# Patient Record
Sex: FEMALE | Race: WHITE | NOT HISPANIC OR LATINO | Employment: OTHER | URBAN - METROPOLITAN AREA
[De-identification: names, ages, dates, MRNs, and addresses within clinical notes are randomized per-mention and may not be internally consistent; named-entity substitution may affect disease eponyms.]

---

## 2019-02-19 ENCOUNTER — HOSPITAL ENCOUNTER (OUTPATIENT)
Dept: RESPIRATORY THERAPY | Facility: HOSPITAL | Age: 59
Discharge: HOME | End: 2019-02-19

## 2020-03-16 ENCOUNTER — HOSPITAL ENCOUNTER (OUTPATIENT)
Facility: HOSPITAL | Age: 60
Setting detail: OBSERVATION
Discharge: HOME | End: 2020-03-17
Attending: INTERNAL MEDICINE | Admitting: INTERNAL MEDICINE

## 2020-03-16 ENCOUNTER — APPOINTMENT (OUTPATIENT)
Dept: CARDIOLOGY | Facility: HOSPITAL | Age: 60
End: 2020-03-16

## 2020-03-16 DIAGNOSIS — I82.4Y1 ACUTE DEEP VEIN THROMBOSIS (DVT) OF PROXIMAL VEIN OF RIGHT LOWER EXTREMITY (HCC): ICD-10-CM

## 2020-03-16 DIAGNOSIS — M79.604 RIGHT LEG PAIN: Primary | ICD-10-CM

## 2020-03-16 PROBLEM — I82.409 DVT OF LEG (DEEP VENOUS THROMBOSIS): Status: ACTIVE | Noted: 2020-03-16

## 2020-03-16 PROBLEM — G35 MULTIPLE SCLEROSIS (HCC): Status: ACTIVE | Noted: 2020-03-16

## 2020-03-16 PROBLEM — F17.200 SMOKER: Status: ACTIVE | Noted: 2017-06-08

## 2020-03-16 LAB
ALBUMIN SERPL-MCNC: 2.9 G/DL (ref 3.5–5.2)
ALBUMIN/GLOB SERPL: 0.9 G/DL
ALP SERPL-CCNC: 75 U/L (ref 39–117)
ALT SERPL W P-5'-P-CCNC: 27 U/L (ref 1–33)
ANION GAP SERPL CALCULATED.3IONS-SCNC: 12 MMOL/L (ref 5–15)
APTT PPP: 26.8 SECONDS (ref 61–76.5)
AST SERPL-CCNC: 44 U/L (ref 1–32)
BASOPHILS # BLD AUTO: 0.1 10*3/MM3 (ref 0–0.2)
BASOPHILS NFR BLD AUTO: 1.4 % (ref 0–1.5)
BH CV LOW VAS LEFT COMMON FEMORAL SPONT: 1
BH CV LOW VAS LEFT DISTAL FEMORAL SPONT: 1
BH CV LOW VAS LEFT GASTRONEMIUS VESSEL: 1
BH CV LOW VAS LEFT GREATER SAPH AK VESSEL: 1
BH CV LOW VAS LEFT MID FEMORAL SPONT: 1
BH CV LOW VAS LEFT POPLITEAL SPONT: 1
BH CV LOW VAS LEFT POSTERIOR TIBIAL VESSEL: 1
BH CV LOW VAS LEFT PROFUNDA FEMORAL SPONT: 1
BH CV LOW VAS LEFT PROXIMAL FEMORAL SPONT: 1
BH CV LOW VAS LEFT SAPHENOFEMORAL JUNCTION SPONT: 1
BH CV LOWER VASCULAR LEFT COMMON FEMORAL AUGMENT: NORMAL
BH CV LOWER VASCULAR LEFT COMMON FEMORAL COMPETENT: NORMAL
BH CV LOWER VASCULAR LEFT COMMON FEMORAL COMPRESS: NORMAL
BH CV LOWER VASCULAR LEFT COMMON FEMORAL PHASIC: NORMAL
BH CV LOWER VASCULAR LEFT COMMON FEMORAL SPONT: NORMAL
BH CV LOWER VASCULAR LEFT COMMON FEMORAL THROMBUS: NORMAL
BH CV LOWER VASCULAR LEFT DISTAL FEMORAL AUGMENT: NORMAL
BH CV LOWER VASCULAR LEFT DISTAL FEMORAL COMPETENT: NORMAL
BH CV LOWER VASCULAR LEFT DISTAL FEMORAL COMPRESS: NORMAL
BH CV LOWER VASCULAR LEFT DISTAL FEMORAL PHASIC: NORMAL
BH CV LOWER VASCULAR LEFT DISTAL FEMORAL SPONT: NORMAL
BH CV LOWER VASCULAR LEFT DISTAL FEMORAL THROMBUS: NORMAL
BH CV LOWER VASCULAR LEFT GASTRONEMIUS COMPRESS: NORMAL
BH CV LOWER VASCULAR LEFT GASTRONEMIUS THROMBUS: NORMAL
BH CV LOWER VASCULAR LEFT GREATER SAPH AK COMPRESS: NORMAL
BH CV LOWER VASCULAR LEFT GREATER SAPH AK THROMBUS: NORMAL
BH CV LOWER VASCULAR LEFT GREATER SAPH BK COMPRESS: NORMAL
BH CV LOWER VASCULAR LEFT LESSER SAPH COMPRESS: NORMAL
BH CV LOWER VASCULAR LEFT MID FEMORAL AUGMENT: NORMAL
BH CV LOWER VASCULAR LEFT MID FEMORAL COMPETENT: NORMAL
BH CV LOWER VASCULAR LEFT MID FEMORAL COMPRESS: NORMAL
BH CV LOWER VASCULAR LEFT MID FEMORAL PHASIC: NORMAL
BH CV LOWER VASCULAR LEFT MID FEMORAL SPONT: NORMAL
BH CV LOWER VASCULAR LEFT MID FEMORAL THROMBUS: NORMAL
BH CV LOWER VASCULAR LEFT PERONEAL COMPRESS: NORMAL
BH CV LOWER VASCULAR LEFT POPLITEAL AUGMENT: NORMAL
BH CV LOWER VASCULAR LEFT POPLITEAL COMPETENT: NORMAL
BH CV LOWER VASCULAR LEFT POPLITEAL COMPRESS: NORMAL
BH CV LOWER VASCULAR LEFT POPLITEAL PHASIC: NORMAL
BH CV LOWER VASCULAR LEFT POPLITEAL SPONT: NORMAL
BH CV LOWER VASCULAR LEFT POPLITEAL THROMBUS: NORMAL
BH CV LOWER VASCULAR LEFT POSTERIOR TIBIAL COMPRESS: NORMAL
BH CV LOWER VASCULAR LEFT POSTERIOR TIBIAL THROMBUS: NORMAL
BH CV LOWER VASCULAR LEFT PROFUNDA FEMORAL AUGMENT: NORMAL
BH CV LOWER VASCULAR LEFT PROFUNDA FEMORAL COMPETENT: NORMAL
BH CV LOWER VASCULAR LEFT PROFUNDA FEMORAL COMPRESS: NORMAL
BH CV LOWER VASCULAR LEFT PROFUNDA FEMORAL PHASIC: NORMAL
BH CV LOWER VASCULAR LEFT PROFUNDA FEMORAL SPONT: NORMAL
BH CV LOWER VASCULAR LEFT PROFUNDA FEMORAL THROMBUS: NORMAL
BH CV LOWER VASCULAR LEFT PROXIMAL FEMORAL AUGMENT: NORMAL
BH CV LOWER VASCULAR LEFT PROXIMAL FEMORAL COMPETENT: NORMAL
BH CV LOWER VASCULAR LEFT PROXIMAL FEMORAL COMPRESS: NORMAL
BH CV LOWER VASCULAR LEFT PROXIMAL FEMORAL PHASIC: NORMAL
BH CV LOWER VASCULAR LEFT PROXIMAL FEMORAL SPONT: NORMAL
BH CV LOWER VASCULAR LEFT PROXIMAL FEMORAL THROMBUS: NORMAL
BH CV LOWER VASCULAR LEFT SAPHENOFEMORAL JUNCTION AUGMENT: NORMAL
BH CV LOWER VASCULAR LEFT SAPHENOFEMORAL JUNCTION COMPETENT: NORMAL
BH CV LOWER VASCULAR LEFT SAPHENOFEMORAL JUNCTION COMPRESS: NORMAL
BH CV LOWER VASCULAR LEFT SAPHENOFEMORAL JUNCTION PHASIC: NORMAL
BH CV LOWER VASCULAR LEFT SAPHENOFEMORAL JUNCTION SPONT: NORMAL
BH CV LOWER VASCULAR LEFT SAPHENOFEMORAL JUNCTION THROMBUS: NORMAL
BH CV LOWER VASCULAR RIGHT COMMON FEMORAL AUGMENT: NORMAL
BH CV LOWER VASCULAR RIGHT COMMON FEMORAL COMPETENT: NORMAL
BH CV LOWER VASCULAR RIGHT COMMON FEMORAL COMPRESS: NORMAL
BH CV LOWER VASCULAR RIGHT COMMON FEMORAL PHASIC: NORMAL
BH CV LOWER VASCULAR RIGHT COMMON FEMORAL SPONT: NORMAL
BILIRUB SERPL-MCNC: 0.7 MG/DL (ref 0.2–1.2)
BUN BLD-MCNC: 5 MG/DL (ref 6–20)
BUN/CREAT SERPL: 12.8 (ref 7–25)
CALCIUM SPEC-SCNC: 9 MG/DL (ref 8.6–10.5)
CHLORIDE SERPL-SCNC: 103 MMOL/L (ref 98–107)
CO2 SERPL-SCNC: 22 MMOL/L (ref 22–29)
CREAT BLD-MCNC: 0.39 MG/DL (ref 0.57–1)
DEPRECATED RDW RBC AUTO: 47.7 FL (ref 37–54)
EOSINOPHIL # BLD AUTO: 0.1 10*3/MM3 (ref 0–0.4)
EOSINOPHIL NFR BLD AUTO: 1.5 % (ref 0.3–6.2)
ERYTHROCYTE [DISTWIDTH] IN BLOOD BY AUTOMATED COUNT: 13.7 % (ref 12.3–15.4)
GFR SERPL CREATININE-BSD FRML MDRD: >150 ML/MIN/1.73
GLOBULIN UR ELPH-MCNC: 3.1 GM/DL
GLUCOSE BLD-MCNC: 161 MG/DL (ref 65–99)
HCT VFR BLD AUTO: 49.1 % (ref 34–46.6)
HGB BLD-MCNC: 16.8 G/DL (ref 12–15.9)
HOLD SPECIMEN: NORMAL
HOLD SPECIMEN: NORMAL
INR PPP: 1.1 (ref 0.9–1.1)
LYMPHOCYTES # BLD AUTO: 2.6 10*3/MM3 (ref 0.7–3.1)
LYMPHOCYTES NFR BLD AUTO: 27.9 % (ref 19.6–45.3)
MCH RBC QN AUTO: 34 PG (ref 26.6–33)
MCHC RBC AUTO-ENTMCNC: 34.3 G/DL (ref 31.5–35.7)
MCV RBC AUTO: 99.2 FL (ref 79–97)
MONOCYTES # BLD AUTO: 1.3 10*3/MM3 (ref 0.1–0.9)
MONOCYTES NFR BLD AUTO: 14.1 % (ref 5–12)
NEUTROPHILS # BLD AUTO: 5.2 10*3/MM3 (ref 1.7–7)
NEUTROPHILS NFR BLD AUTO: 55.1 % (ref 42.7–76)
NRBC BLD AUTO-RTO: 0 /100 WBC (ref 0–0.2)
PLATELET # BLD AUTO: 264 10*3/MM3 (ref 140–450)
PMV BLD AUTO: 8.8 FL (ref 6–12)
POTASSIUM BLD-SCNC: 3.3 MMOL/L (ref 3.5–5.2)
PROT SERPL-MCNC: 6 G/DL (ref 6–8.5)
PROTHROMBIN TIME: 11.4 SECONDS (ref 9.6–11.7)
RBC # BLD AUTO: 4.95 10*6/MM3 (ref 3.77–5.28)
SODIUM BLD-SCNC: 137 MMOL/L (ref 136–145)
WBC NRBC COR # BLD: 9.4 10*3/MM3 (ref 3.4–10.8)
WHOLE BLOOD HOLD SPECIMEN: NORMAL

## 2020-03-16 PROCEDURE — 80053 COMPREHEN METABOLIC PANEL: CPT | Performed by: PHYSICIAN ASSISTANT

## 2020-03-16 PROCEDURE — G0378 HOSPITAL OBSERVATION PER HR: HCPCS

## 2020-03-16 PROCEDURE — 99284 EMERGENCY DEPT VISIT MOD MDM: CPT

## 2020-03-16 PROCEDURE — 85610 PROTHROMBIN TIME: CPT | Performed by: NURSE PRACTITIONER

## 2020-03-16 PROCEDURE — 85025 COMPLETE CBC W/AUTO DIFF WBC: CPT | Performed by: PHYSICIAN ASSISTANT

## 2020-03-16 PROCEDURE — 25010000002 HEPARIN (PORCINE) PER 1000 UNITS: Performed by: NURSE PRACTITIONER

## 2020-03-16 PROCEDURE — 99219 PR INITIAL OBSERVATION CARE/DAY 50 MINUTES: CPT | Performed by: PHYSICIAN ASSISTANT

## 2020-03-16 PROCEDURE — 93971 EXTREMITY STUDY: CPT

## 2020-03-16 PROCEDURE — 85730 THROMBOPLASTIN TIME PARTIAL: CPT | Performed by: NURSE PRACTITIONER

## 2020-03-16 RX ORDER — MAGNESIUM SULFATE HEPTAHYDRATE 40 MG/ML
4 INJECTION, SOLUTION INTRAVENOUS AS NEEDED
Status: DISCONTINUED | OUTPATIENT
Start: 2020-03-16 | End: 2020-03-17 | Stop reason: HOSPADM

## 2020-03-16 RX ORDER — POTASSIUM CHLORIDE 20 MEQ/1
40 TABLET, EXTENDED RELEASE ORAL AS NEEDED
Status: DISCONTINUED | OUTPATIENT
Start: 2020-03-16 | End: 2020-03-17 | Stop reason: HOSPADM

## 2020-03-16 RX ORDER — ONDANSETRON 4 MG/1
4 TABLET, FILM COATED ORAL EVERY 6 HOURS PRN
Status: DISCONTINUED | OUTPATIENT
Start: 2020-03-16 | End: 2020-03-17 | Stop reason: HOSPADM

## 2020-03-16 RX ORDER — MAGNESIUM SULFATE HEPTAHYDRATE 40 MG/ML
2 INJECTION, SOLUTION INTRAVENOUS AS NEEDED
Status: DISCONTINUED | OUTPATIENT
Start: 2020-03-16 | End: 2020-03-17 | Stop reason: HOSPADM

## 2020-03-16 RX ORDER — HEPARIN SODIUM 10000 [USP'U]/100ML
18 INJECTION, SOLUTION INTRAVENOUS
Status: DISCONTINUED | OUTPATIENT
Start: 2020-03-16 | End: 2020-03-17

## 2020-03-16 RX ORDER — ACETAMINOPHEN 160 MG/5ML
650 SOLUTION ORAL EVERY 4 HOURS PRN
Status: DISCONTINUED | OUTPATIENT
Start: 2020-03-16 | End: 2020-03-17 | Stop reason: HOSPADM

## 2020-03-16 RX ORDER — CALCIUM CARBONATE 200(500)MG
2 TABLET,CHEWABLE ORAL 2 TIMES DAILY PRN
Status: DISCONTINUED | OUTPATIENT
Start: 2020-03-16 | End: 2020-03-17 | Stop reason: HOSPADM

## 2020-03-16 RX ORDER — ONDANSETRON 2 MG/ML
4 INJECTION INTRAMUSCULAR; INTRAVENOUS EVERY 6 HOURS PRN
Status: DISCONTINUED | OUTPATIENT
Start: 2020-03-16 | End: 2020-03-17 | Stop reason: HOSPADM

## 2020-03-16 RX ORDER — CHOLECALCIFEROL (VITAMIN D3) 125 MCG
5 CAPSULE ORAL NIGHTLY PRN
Status: DISCONTINUED | OUTPATIENT
Start: 2020-03-16 | End: 2020-03-17 | Stop reason: HOSPADM

## 2020-03-16 RX ORDER — SODIUM CHLORIDE 0.9 % (FLUSH) 0.9 %
10 SYRINGE (ML) INJECTION EVERY 12 HOURS SCHEDULED
Status: DISCONTINUED | OUTPATIENT
Start: 2020-03-16 | End: 2020-03-17 | Stop reason: HOSPADM

## 2020-03-16 RX ORDER — BISACODYL 10 MG
10 SUPPOSITORY, RECTAL RECTAL DAILY PRN
Status: DISCONTINUED | OUTPATIENT
Start: 2020-03-16 | End: 2020-03-17 | Stop reason: HOSPADM

## 2020-03-16 RX ORDER — ACETAMINOPHEN 650 MG/1
650 SUPPOSITORY RECTAL EVERY 4 HOURS PRN
Status: DISCONTINUED | OUTPATIENT
Start: 2020-03-16 | End: 2020-03-17 | Stop reason: HOSPADM

## 2020-03-16 RX ORDER — ALUMINA, MAGNESIA, AND SIMETHICONE 2400; 2400; 240 MG/30ML; MG/30ML; MG/30ML
15 SUSPENSION ORAL EVERY 6 HOURS PRN
Status: DISCONTINUED | OUTPATIENT
Start: 2020-03-16 | End: 2020-03-17 | Stop reason: HOSPADM

## 2020-03-16 RX ORDER — DOCUSATE SODIUM 100 MG/1
100 CAPSULE, LIQUID FILLED ORAL 2 TIMES DAILY PRN
Status: DISCONTINUED | OUTPATIENT
Start: 2020-03-16 | End: 2020-03-17 | Stop reason: HOSPADM

## 2020-03-16 RX ORDER — SODIUM CHLORIDE 0.9 % (FLUSH) 0.9 %
10 SYRINGE (ML) INJECTION AS NEEDED
Status: DISCONTINUED | OUTPATIENT
Start: 2020-03-16 | End: 2020-03-17 | Stop reason: HOSPADM

## 2020-03-16 RX ORDER — POTASSIUM CHLORIDE 1.5 G/1.77G
40 POWDER, FOR SOLUTION ORAL AS NEEDED
Status: DISCONTINUED | OUTPATIENT
Start: 2020-03-16 | End: 2020-03-17 | Stop reason: HOSPADM

## 2020-03-16 RX ORDER — ACETAMINOPHEN 325 MG/1
650 TABLET ORAL EVERY 4 HOURS PRN
Status: DISCONTINUED | OUTPATIENT
Start: 2020-03-16 | End: 2020-03-17 | Stop reason: HOSPADM

## 2020-03-16 RX ADMIN — Medication 10 ML: at 23:18

## 2020-03-16 RX ADMIN — HEPARIN SODIUM 18 UNITS/KG/HR: 10000 INJECTION, SOLUTION INTRAVENOUS at 18:26

## 2020-03-16 NOTE — ED PROVIDER NOTES
Subjective   Patient is a 50-year-old female who complains of left leg pain and swelling for the last week.  She states that she has no medical history she takes no medications and does not have a physician.-States the pain is in her groin and radiates down his had no trauma to the leg.  She has no history of DVT she rates her pain a 6/10 she states it is worse with ambulation.-      Appropriate PPE was worn for the patient exam.          Review of Systems   Constitutional: Negative for chills, fatigue and fever.   HENT: Negative for congestion, tinnitus and trouble swallowing.    Eyes: Negative for photophobia, discharge and redness.   Respiratory: Negative for cough and shortness of breath.    Cardiovascular: Negative for chest pain and palpitations.   Gastrointestinal: Negative for abdominal pain, diarrhea, nausea and vomiting.   Genitourinary: Negative for dysuria, frequency and urgency.   Musculoskeletal: Negative for back pain, joint swelling and myalgias.        Left leg ppain / edema     Skin: Negative for rash.   Neurological: Negative for dizziness and headaches.   Psychiatric/Behavioral: Negative for confusion.   All other systems reviewed and are negative.      Past Medical History:   Diagnosis Date   • Multiple sclerosis (CMS/HCC)        No Known Allergies    No past surgical history on file.    No family history on file.    Social History     Socioeconomic History   • Marital status:      Spouse name: Not on file   • Number of children: Not on file   • Years of education: Not on file   • Highest education level: Not on file   Tobacco Use   • Smoking status: Current Every Day Smoker     Packs/day: 1.50     Years: 35.00     Pack years: 52.50     Types: Cigarettes   • Smokeless tobacco: Never Used   Substance and Sexual Activity   • Alcohol use: Never     Frequency: Never   • Drug use: Never   • Sexual activity: Defer           Objective   Physical Exam   Constitutional: She is oriented to person,  "place, and time. She appears well-developed and well-nourished.   HENT:   Head: Normocephalic and atraumatic.   Eyes: Pupils are equal, round, and reactive to light. Conjunctivae and EOM are normal.   Neck: Normal range of motion. Neck supple.   Cardiovascular: Normal rate, regular rhythm, normal heart sounds and intact distal pulses.   Pulmonary/Chest: Effort normal and breath sounds normal. No respiratory distress. She has no wheezes.   Abdominal: Soft. Bowel sounds are normal. She exhibits no distension and no mass. There is no tenderness. There is no rebound and no guarding.   Musculoskeletal: Normal range of motion. She exhibits no deformity.   Left lower extremity has edema from the left groin to the foot.  There is a good distal pulse good cap refill distally neurovascularly intact.  The edema is nonpitting there is a positive Homans sign   Neurological: She is alert and oriented to person, place, and time. She has normal strength and normal reflexes. She displays normal reflexes. No cranial nerve deficit or sensory deficit. GCS eye subscore is 4. GCS verbal subscore is 5. GCS motor subscore is 6.   Skin: Skin is warm, dry and intact. Capillary refill takes less than 2 seconds. No rash noted.   Psychiatric: She has a normal mood and affect. Her speech is normal and behavior is normal. Judgment and thought content normal. Cognition and memory are normal.   Vitals reviewed.      Procedures           ED Course      /88 (BP Location: Left arm, Patient Position: Sitting)   Pulse 92   Temp 97.6 °F (36.4 °C) (Oral)   Resp 18   Ht 160 cm (63\")   Wt 61.2 kg (135 lb)   SpO2 95%   BMI 23.91 kg/m²   Labs Reviewed   PROTIME-INR   APTT     Medications   heparin bolus from bag 4,900 Units (has no administration in time range)   heparin 83636 units/250 ml (100 units/ml) in D5W (has no administration in time range)   heparin bolus from bag 2,500 Units (has no administration in time range)   heparin bolus from bag " 4,900 Units (has no administration in time range)     Extensive DVT left lower extremity                                     MDM  Number of Diagnoses or Management Options  Acute deep vein thrombosis (DVT) of proximal vein of right lower extremity (CMS/HCC):   Right leg pain:   Diagnosis management comments: Patient had above exam with appropriate PPE and DVT was identified via the venous Doppler patient will be started on heparin due to the extensive nature of the DVT and admitted as she has no follow-up with PCP.  Patient was agreeable to this plan of care      Was discussed with Jovanna physician assistant and will be admitted to Dr. Hemphill hospitalist         Amount and/or Complexity of Data Reviewed  Tests in the radiology section of CPT®: reviewed  Independent visualization of images, tracings, or specimens: yes    Patient Progress  Patient progress: stable      Final diagnoses:   Right leg pain   Acute deep vein thrombosis (DVT) of proximal vein of right lower extremity (CMS/HCC)            Claire Barclay, APRN  03/16/20 1827

## 2020-03-16 NOTE — H&P
[facility] Medicine Services    Patient Name: Nelda Alcocer  : 1960  MRN: [id number]  Primary Care Physician: Darrell, No Known  Date of admission: 3/16/2020    Patient Care Team:  Provider, No Known as PCP - General    Subjective   History Present Illness     Chief Complaint:   Chief Complaint   Patient presents with   • Leg Swelling     Ms. Alcocer is a 60 y.o. with a past medical history of multiple sclerosis, alcohol use and tobacco abuse who presents to [facility] 3/16 complaining of left lower extremity pain and swelling x 1 week.  The patient states her pain is resolved if she sits still, but flares up if she walks around.  She states her swelling has gradually worsened, bringing her into the ED.  She denies recent travel or long car rides.  She denies chest pain, dyspnea or previous blood clots.  The patient does not have a primary care provider.     In the ED, the patient's LLE duplex revealed acute DVT thrombosis noted in common femoral, profunda femoral, proximal femoral, mid femoral, distal femoral, popliteal, posterial tibial and gastrocnemius/soleal.  Because the patient does not have a PCP to follow up with while on anticoagulation, she was admitted for heparin initiation.     History of Present Illness    Review of Systems   Cardiovascular: Negative for chest pain.   Respiratory: Negative for shortness of breath.    Skin:        LLE redness   Musculoskeletal:        LLE pain, swelling     All other systems reviewed and are negative.    Personal History     Past Medical History:   Past Medical History:   Diagnosis Date   • Multiple sclerosis (CMS/HCC)      Surgical History:    History reviewed. No pertinent surgical history.    Family History: family history includes Peripheral vascular disease in her mother. Otherwise pertinent FHx was reviewed and unremarkable.     Social History:  reports that she has been smoking cigarettes. She has a 52.50  pack-year smoking history. She has never used smokeless tobacco. She reports that she does not drink alcohol or use drugs.    Medications:  Prior to Admission medications    Not on File       Allergies:  No Known Allergies    Objective   Objective     Vital Signs  Temp:  [97.6 °F (36.4 °C)-98.9 °F (37.2 °C)] 98.8 °F (37.1 °C)  Heart Rate:  [75-92] 81  Resp:  [16-18] 16  BP: (118-141)/(14-89) 134/78  SpO2:  [93 %-99 %] 96 %  on   ;   Device (Oxygen Therapy): room air  Body mass index is 23.35 kg/m².    Physical Exam   Constitutional: She is oriented to person, place, and time. She appears well-developed and well-nourished. No distress.   HENT:   Head: Normocephalic and atraumatic.   Eyes: Pupils are equal, round, and reactive to light. EOM are normal.   Neck: Normal range of motion.   Cardiovascular: Normal rate, regular rhythm and normal heart sounds. Exam reveals no gallop and no friction rub.   No murmur heard.  Pulmonary/Chest: Effort normal and breath sounds normal. No stridor. No respiratory distress. She has no wheezes.   Abdominal: Soft. Bowel sounds are normal. She exhibits no distension. There is no tenderness. There is no guarding.   Musculoskeletal: Normal range of motion. She exhibits edema (LLE) and tenderness (LLE).   Neurological: She is alert and oriented to person, place, and time.   Skin: Skin is warm. She is not diaphoretic. There is erythema (LLE).   Psychiatric: She has a normal mood and affect.   Vitals reviewed.    Results Review:    Results from last 7 days   Lab Units 03/16/20  1826   INR  1.10           Invalid input(s):  ALKPHOS  CrCl cannot be calculated (No successful lab value found.).  Brief Urine Lab Results     None          Microbiology Results (last 10 days)     ** No results found for the last 240 hours. **          ECG/EMG Results (most recent)     None          Results for orders placed during the hospital encounter of 03/16/20   Duplex Venous Lower Extremity - Left    Narrative  "· Acute left lower extremity deep vein thrombosis noted in the common   femoral, profunda femoral, proximal femoral, mid femoral, distal femoral,   popliteal, posterial tibial and gastrocnemius/soleal.  · Acute left lower extremity superficial thrombophlebitis noted in the   saphenofemoral junction and greater saphenous (above knee).  · All other left sided veins appeared normal.               No radiology results for the last 7 days      CrCl cannot be calculated (No successful lab value found.).    Assessment/Plan   Assessment/Plan       Active Hospital Problems    Diagnosis  POA   • **DVT of leg (deep venous thrombosis) (CMS/[facility]) [I82.409]  Yes     Priority: High   • Multiple sclerosis (CMS/[facility]) [G35]  Unknown   • Smoker [F17.200]  Yes      Resolved Hospital Problems   No resolved problems to display.     Acute left lower extremity DVT   - LLE duplex revealed acute DVT thrombosis noted in common femoral, profunda femoral, proximal femoral, mid femoral, distal femoral, popliteal, posterial tibial and gastrocnemius/soleal  - CBC / BMP pending  - patient started on Heparin in ED, continue   - regular diet     Alcohol use  - patient will not disclose how much she drinks -- she states \"it depends on the day\"    Tobacco abuse   - encourage cessation     VTE Prophylaxis - Heparin     Mechanical Order History:     None      Pharmalogical Order History:     Ordered     Dose Route Frequency Stop    03/16/20 1807  heparin bolus from bag 4,900 Units      80 Units/kg IV Once 03/16/20 1828    03/16/20 1807  heparin 94037 units/250 ml (100 units/ml) in D5W  11.01 mL/hr      18 Units/kg/hr IV Titrated --    03/16/20 1807  heparin bolus from bag 2,500 Units      40 Units/kg IV Every 6 Hours PRN --    03/16/20 1807  heparin bolus from bag 4,900 Units      80 Units/kg IV Every 6 Hours PRN --          CODE STATUS:    Code Status and Medical Interventions:   Ordered at: 03/16/20 1950     Code Status:    CPR     Medical Interventions " (Level of Support Prior to Arrest):    Full     I discussed the patient's findings and my recommendations with patient.    Electronically signed by Jovanna Murguia PA-C, 03/16/20, 6:45 PM.  [facility], [place], [facility]

## 2020-03-17 VITALS
WEIGHT: 131.84 LBS | HEART RATE: 78 BPM | DIASTOLIC BLOOD PRESSURE: 69 MMHG | HEIGHT: 63 IN | TEMPERATURE: 97.5 F | RESPIRATION RATE: 17 BRPM | SYSTOLIC BLOOD PRESSURE: 100 MMHG | BODY MASS INDEX: 23.36 KG/M2 | OXYGEN SATURATION: 95 %

## 2020-03-17 LAB
ANION GAP SERPL CALCULATED.3IONS-SCNC: 13 MMOL/L (ref 5–15)
APTT PPP: 57 SECONDS (ref 61–76.5)
APTT PPP: 62.1 SECONDS (ref 61–76.5)
BASOPHILS # BLD AUTO: 0.1 10*3/MM3 (ref 0–0.2)
BASOPHILS NFR BLD AUTO: 1.4 % (ref 0–1.5)
BUN BLD-MCNC: 6 MG/DL (ref 6–20)
BUN/CREAT SERPL: 12.8 (ref 7–25)
CALCIUM SPEC-SCNC: 8.6 MG/DL (ref 8.6–10.5)
CHLORIDE SERPL-SCNC: 103 MMOL/L (ref 98–107)
CO2 SERPL-SCNC: 23 MMOL/L (ref 22–29)
CREAT BLD-MCNC: 0.47 MG/DL (ref 0.57–1)
DEPRECATED RDW RBC AUTO: 48.6 FL (ref 37–54)
EOSINOPHIL # BLD AUTO: 0.2 10*3/MM3 (ref 0–0.4)
EOSINOPHIL NFR BLD AUTO: 2.3 % (ref 0.3–6.2)
ERYTHROCYTE [DISTWIDTH] IN BLOOD BY AUTOMATED COUNT: 14 % (ref 12.3–15.4)
GFR SERPL CREATININE-BSD FRML MDRD: 136 ML/MIN/1.73
GLUCOSE BLD-MCNC: 150 MG/DL (ref 65–99)
HCT VFR BLD AUTO: 45.1 % (ref 34–46.6)
HGB BLD-MCNC: 16 G/DL (ref 12–15.9)
LYMPHOCYTES # BLD AUTO: 2.8 10*3/MM3 (ref 0.7–3.1)
LYMPHOCYTES NFR BLD AUTO: 32.5 % (ref 19.6–45.3)
MCH RBC QN AUTO: 35.4 PG (ref 26.6–33)
MCHC RBC AUTO-ENTMCNC: 35.4 G/DL (ref 31.5–35.7)
MCV RBC AUTO: 100.1 FL (ref 79–97)
MONOCYTES # BLD AUTO: 1.3 10*3/MM3 (ref 0.1–0.9)
MONOCYTES NFR BLD AUTO: 15.4 % (ref 5–12)
NEUTROPHILS # BLD AUTO: 4.2 10*3/MM3 (ref 1.7–7)
NEUTROPHILS NFR BLD AUTO: 48.4 % (ref 42.7–76)
NRBC BLD AUTO-RTO: 0 /100 WBC (ref 0–0.2)
PLATELET # BLD AUTO: 267 10*3/MM3 (ref 140–450)
PMV BLD AUTO: 9.9 FL (ref 6–12)
POTASSIUM BLD-SCNC: 3.3 MMOL/L (ref 3.5–5.2)
RBC # BLD AUTO: 4.5 10*6/MM3 (ref 3.77–5.28)
SODIUM BLD-SCNC: 139 MMOL/L (ref 136–145)
WBC NRBC COR # BLD: 8.7 10*3/MM3 (ref 3.4–10.8)

## 2020-03-17 PROCEDURE — 80048 BASIC METABOLIC PNL TOTAL CA: CPT | Performed by: PHYSICIAN ASSISTANT

## 2020-03-17 PROCEDURE — 85730 THROMBOPLASTIN TIME PARTIAL: CPT | Performed by: PHYSICIAN ASSISTANT

## 2020-03-17 PROCEDURE — 85730 THROMBOPLASTIN TIME PARTIAL: CPT | Performed by: INTERNAL MEDICINE

## 2020-03-17 PROCEDURE — G0378 HOSPITAL OBSERVATION PER HR: HCPCS

## 2020-03-17 PROCEDURE — 99217 PR OBSERVATION CARE DISCHARGE MANAGEMENT: CPT | Performed by: INTERNAL MEDICINE

## 2020-03-17 PROCEDURE — 85025 COMPLETE CBC W/AUTO DIFF WBC: CPT | Performed by: PHYSICIAN ASSISTANT

## 2020-03-17 RX ADMIN — Medication 10 ML: at 10:42

## 2020-03-17 RX ADMIN — APIXABAN 10 MG: 5 TABLET, FILM COATED ORAL at 10:46

## 2020-03-17 NOTE — DISCHARGE SUMMARY
[facility] Medicine Services  DISCHARGE SUMMARY        Prepared For PCP:  Provider, No Known    Patient Name: Nelda Alcocer  : 1960  MRN: [id number]      Date of Admission:   3/16/2020    Date of Discharge:  3/17/2020    Length of stay:  LOS: 0 days     Hospital Course     Presenting Problem:   DVT of leg (deep venous thrombosis) (CMS/[facility]) [I82.409]  Right leg pain [M79.604]  Acute deep vein thrombosis (DVT) of proximal vein of right lower extremity (CMS/[facility]) [I82.4Y1]      Active Hospital Problems    Diagnosis  POA   • **DVT of leg (deep venous thrombosis) (CMS/[facility]) [I82.409]  Yes   • Multiple sclerosis (CMS/[facility]) [G35]  Unknown   • Smoker [F17.200]  Yes      Resolved Hospital Problems   No resolved problems to display.             Recommendation for Outpatient Providers:                     Day of Discharge     HPI and Clinical Course  Ms. Alcocer is a 60 y.o. with a past medical history of multiple sclerosis, alcohol use and tobacco abuse who presents to [facility] 3/16 complaining of left lower extremity pain and swelling x 1 week.  The patient states her pain is resolved if she sits still, but flares up if she walks around.  She states her swelling has gradually worsened, bringing her into the ED.  She denies recent travel or long car rides.  She denies chest pain, dyspnea or previous blood clots.  The patient does not have a primary care provider.      In the ED, the patient's LLE duplex revealed acute DVT thrombosis noted in common femoral, profunda femoral, proximal femoral, mid femoral, distal femoral, popliteal, posterial tibial and gastrocnemius/soleal.  Because the patient does not have a PCP to follow up with while on anticoagulation, she was admitted for heparin initiation.    In the morning, she already felt better. The thigh showed swelling but it was not that tender. No erythema.  We discussed with CM and pharmacist. Her  is working for  Insurance.  We could provide a month coupon for Eliquis.  Meanwhile she should find a new PCP and Insurance. She agreed the plan.  And the patient didn't want to stay hospital long as [id number] outbreak is apparent.    Eliquis 10mg bid for 7 days follow 5 mg bid  She is discharged home    Vital Signs:   Temp:  [97.5 °F (36.4 °C)-98.8 °F (37.1 °C)] 97.5 °F (36.4 °C)  Heart Rate:  [75-88] 78  Resp:  [16-17] 17  BP: ()/(14-78) 100/69     Physical Exam:  Physical Exam  Constitutional: She is oriented to person, place, and time. She appears well-developed and well-nourished. No distress.   HENT:   Head: Normocephalic and atraumatic.   Eyes: Pupils are equal, round, and reactive to light. EOM are normal.   Neck: Normal range of motion.   Cardiovascular: Normal rate, regular rhythm and normal heart sounds. Exam reveals no gallop and no friction rub.   No murmur heard.  Pulmonary/Chest: Effort normal and breath sounds normal. No stridor. No respiratory distress. She has no wheezes.   Abdominal: Soft. Bowel sounds are normal. She exhibits no distension. There is no tenderness. There is no guarding.   Musculoskeletal: Normal range of motion. She exhibits edema (LLE) and tenderness (LLE).   Neurological: She is alert and oriented to person, place, and time.   Skin: Skin is warm. She is not diaphoretic.   Psychiatric: She has a normal mood and affect.   Vitals reviewed  Pertinent  and/or Most Recent Results     Results from last 7 days   Lab Units 03/17/20  0618 03/16/20  2313   WBC 10*3/mm3 8.70 9.40   HEMOGLOBIN g/dL 16.0* 16.8*   HEMATOCRIT % 45.1 49.1*   PLATELETS 10*3/mm3 267 264   SODIUM mmol/L 139 137   POTASSIUM mmol/L 3.3* 3.3*   CHLORIDE mmol/L 103 103   CO2 mmol/L 23.0 22.0   BUN mg/dL 6 5*   CREATININE mg/dL 0.47* 0.39*   GLUCOSE mg/dL 150* 161*   CALCIUM mg/dL 8.6 9.0     Results from last 7 days   Lab Units 03/17/20  0618 03/17/20  0042 03/16/20  2313 03/16/20  1826   BILIRUBIN mg/dL  --   --  0.7  --    ALK  PHOS U/L  --   --  75  --    ALT (SGPT) U/L  --   --  27  --    AST (SGOT) U/L  --   --  44*  --    PROTIME Seconds  --   --   --  11.4   INR   --   --   --  1.10   APTT seconds 57.0* 62.1  --  26.8*           Invalid input(s): TG, LDLCALC, LDLREALC        Brief Urine Lab Results     None          Microbiology Results Abnormal     None               Results for orders placed during the hospital encounter of 03/16/20   Duplex Venous Lower Extremity - Left    Narrative · Acute left lower extremity deep vein thrombosis noted in the common   femoral, profunda femoral, proximal femoral, mid femoral, distal femoral,   popliteal, posterial tibial and gastrocnemius/soleal.  · Acute left lower extremity superficial thrombophlebitis noted in the   saphenofemoral junction and greater saphenous (above knee).  · All other left sided veins appeared normal.          Results for orders placed during the hospital encounter of 03/16/20   Duplex Venous Lower Extremity - Left    Narrative · Acute left lower extremity deep vein thrombosis noted in the common   femoral, profunda femoral, proximal femoral, mid femoral, distal femoral,   popliteal, posterial tibial and gastrocnemius/soleal.  · Acute left lower extremity superficial thrombophlebitis noted in the   saphenofemoral junction and greater saphenous (above knee).  · All other left sided veins appeared normal.                       Test Results Pending at Discharge        Procedures Performed           Consults:   Consults     Date and Time Order Name Status Description    3/16/2020 1812 Hospitalist (on-call MD unless specified) Completed             Discharge Details        Discharge Medications      New Medications      Instructions Start Date   apixaban 5 MG tablet tablet  Commonly known as:  ELIQUIS   10 mg, Oral, Every 12 Hours Scheduled      apixaban 5 MG tablet tablet  Commonly known as:  ELIQUIS   5 mg, Oral, Every 12 Hours Scheduled   Start Date:  March 24, 2020             No Known Allergies      Discharge Disposition:  Home or Self Care    Diet:  Hospital:No active diet order        Discharge Activity:   Activity Instructions     As tolerated                 CODE STATUS:    Code Status and Medical Interventions:   Ordered at: 03/16/20 1950     Code Status:    CPR     Medical Interventions (Level of Support Prior to Arrest):    Full         Follow-up Appointments  No future appointments.    PCP will be arranged      Condition on Discharge:      Stable          Electronically signed by Juan R Rios MD, 03/17/20, 6:36 PM.    Time: I spent  35 minutes on this discharge activity which included face-to-face encounter with the patient/reviewing the data in the system/coordination of the care with the nursing staff as well as consultants/documentation/entering orders.

## 2020-03-17 NOTE — NURSING NOTE
Patient had a ptt done at 0026. Results came back at 62.1.  No change required at this time.  Repeat ptt is scheduled for 6hrs (0626).

## 2020-03-17 NOTE — PLAN OF CARE
Problem: Patient Care Overview  Goal: Plan of Care Review  Outcome: Ongoing (interventions implemented as appropriate)  Flowsheets (Taken 3/17/2020 1214)  Progress: improving  Plan of Care Reviewed With: patient  Outcome Summary: Pt still has some redness and swelling in LLE. Heparin has been d/c'd and started on PO eliquis. Pt to be d/c'd today

## 2020-03-17 NOTE — PLAN OF CARE
Patient is alert and oriented with BRP.  Patient stated she has minimal pain in Left upper leg.  Will continue to monitor

## 2020-03-17 NOTE — PROGRESS NOTES
Discharge Planning Assessment  [facility]     Patient Name: Nelda Alcocer  MRN: [id number]  Today's Date: 3/17/2020    Admit Date: 3/16/2020    Discharge Needs Assessment     Row Name 03/17/20 1107       Living Environment    Lives With  spouse    Current Living Arrangements  home/apartment/condo    Primary Care Provided by  self    Provides Primary Care For  no one    Family Caregiver if Needed  spouse    Quality of Family Relationships  unable to assess    Able to Return to Prior Arrangements  yes       Resource/Environmental Concerns    Resource/Environmental Concerns  other (see comments) does not have a primary md        Transition Planning    Patient/Family Anticipates Transition to  home with family    Patient/Family Anticipated Services at Transition  none    Transportation Anticipated  family or friend will provide       Discharge Needs Assessment    Readmission Within the Last 30 Days  no previous admission in last 30 days    Equipment Currently Used at Home  none    Anticipated Changes Related to Illness  other (see comments) no insurance or primary md    Provided Post Acute Provider List?  N/A    N/A Provider List Comment  patient denies any needs for discharge        Discharge Plan     Row Name 03/17/20 1116       Plan    Plan  return home with family    Patient/Family in Agreement with Plan  yes patient does not have a primary md; appointment made with [facility] for 1 pm on thurs 9th; information given to patient; eliquis coupon given to patient for free 30 day and 10 $ co-pay             Functional Status     Row Name 03/17/20 1106       Functional Status    Usual Activity Tolerance  moderate    Current Activity Tolerance  moderate       Functional Status, IADL    Medications  independent    Meal Preparation  independent              Carol naegele rn  Case management  Office number [phone]  Cell phone [phone]

## 2020-03-18 NOTE — PROGRESS NOTES
Continued Stay Note  [facility]     Patient Name: Nelda Alcocer  MRN: [id number]  Today's Date: 3/18/2020    Admit Date: 3/16/2020    Discharge Plan     Row Name 03/18/20 0702       Plan    Final Discharge Disposition Code  01 - home or self-care    Final Note  return home          Carol naegele rn  Case management  Office number [phone]  Cell phone [phone]

## 2020-12-10 ENCOUNTER — OFFICE (OUTPATIENT)
Dept: URBAN - METROPOLITAN AREA CLINIC 64 | Facility: CLINIC | Age: 60
End: 2020-12-10

## 2020-12-10 VITALS
HEART RATE: 82 BPM | HEIGHT: 63 IN | DIASTOLIC BLOOD PRESSURE: 78 MMHG | WEIGHT: 128 LBS | SYSTOLIC BLOOD PRESSURE: 118 MMHG

## 2020-12-10 DIAGNOSIS — R14.0 ABDOMINAL DISTENSION (GASEOUS): ICD-10-CM

## 2020-12-10 DIAGNOSIS — R18.8 OTHER ASCITES: ICD-10-CM

## 2020-12-10 PROCEDURE — 99202 OFFICE O/P NEW SF 15 MIN: CPT | Performed by: INTERNAL MEDICINE

## 2021-06-09 ENCOUNTER — HOSPITAL ENCOUNTER (OUTPATIENT)
Dept: CARDIOLOGY | Facility: HOSPITAL | Age: 61
Discharge: HOME | End: 2021-06-09
Admitting: NURSE PRACTITIONER

## 2021-06-09 ENCOUNTER — TRANSCRIBE ORDERS (OUTPATIENT)
Dept: ADMINISTRATIVE | Facility: HOSPITAL | Age: 61
End: 2021-06-09

## 2021-06-09 DIAGNOSIS — M79.605 PAIN AND SWELLING OF LEFT LOWER EXTREMITY: Primary | ICD-10-CM

## 2021-06-09 DIAGNOSIS — M79.89 PAIN AND SWELLING OF LEFT LOWER EXTREMITY: Primary | ICD-10-CM

## 2021-06-09 DIAGNOSIS — M79.89 PAIN AND SWELLING OF LEFT LOWER EXTREMITY: ICD-10-CM

## 2021-06-09 DIAGNOSIS — M79.605 PAIN AND SWELLING OF LEFT LOWER EXTREMITY: ICD-10-CM

## 2021-06-09 LAB
BH CV LOW VAS LEFT COMMON FEMORAL SPONT: 1
BH CV LOWER VASCULAR LEFT COMMON FEMORAL AUGMENT: NORMAL
BH CV LOWER VASCULAR LEFT COMMON FEMORAL COMPETENT: NORMAL
BH CV LOWER VASCULAR LEFT COMMON FEMORAL COMPRESS: NORMAL
BH CV LOWER VASCULAR LEFT COMMON FEMORAL PHASIC: NORMAL
BH CV LOWER VASCULAR LEFT COMMON FEMORAL SPONT: NORMAL
BH CV LOWER VASCULAR LEFT COMMON FEMORAL THROMBUS: NORMAL
BH CV LOWER VASCULAR LEFT DISTAL FEMORAL COMPRESS: NORMAL
BH CV LOWER VASCULAR LEFT GASTRONEMIUS COMPRESS: NORMAL
BH CV LOWER VASCULAR LEFT GREATER SAPH AK COMPRESS: NORMAL
BH CV LOWER VASCULAR LEFT GREATER SAPH BK COMPRESS: NORMAL
BH CV LOWER VASCULAR LEFT LESSER SAPH COMPRESS: NORMAL
BH CV LOWER VASCULAR LEFT MID FEMORAL AUGMENT: NORMAL
BH CV LOWER VASCULAR LEFT MID FEMORAL COMPETENT: NORMAL
BH CV LOWER VASCULAR LEFT MID FEMORAL COMPRESS: NORMAL
BH CV LOWER VASCULAR LEFT MID FEMORAL PHASIC: NORMAL
BH CV LOWER VASCULAR LEFT MID FEMORAL SPONT: NORMAL
BH CV LOWER VASCULAR LEFT PERONEAL COMPRESS: NORMAL
BH CV LOWER VASCULAR LEFT POPLITEAL AUGMENT: NORMAL
BH CV LOWER VASCULAR LEFT POPLITEAL COMPETENT: NORMAL
BH CV LOWER VASCULAR LEFT POPLITEAL COMPRESS: NORMAL
BH CV LOWER VASCULAR LEFT POPLITEAL PHASIC: NORMAL
BH CV LOWER VASCULAR LEFT POPLITEAL SPONT: NORMAL
BH CV LOWER VASCULAR LEFT POSTERIOR TIBIAL COMPRESS: NORMAL
BH CV LOWER VASCULAR LEFT PROXIMAL FEMORAL COMPRESS: NORMAL
BH CV LOWER VASCULAR LEFT SAPHENOFEMORAL JUNCTION COMPRESS: NORMAL
BH CV LOWER VASCULAR RIGHT COMMON FEMORAL AUGMENT: NORMAL
BH CV LOWER VASCULAR RIGHT COMMON FEMORAL COMPETENT: NORMAL
BH CV LOWER VASCULAR RIGHT COMMON FEMORAL COMPRESS: NORMAL
BH CV LOWER VASCULAR RIGHT COMMON FEMORAL PHASIC: NORMAL
BH CV LOWER VASCULAR RIGHT COMMON FEMORAL SPONT: NORMAL
MAXIMAL PREDICTED HEART RATE: 161 BPM
STRESS TARGET HR: 137 BPM

## 2021-06-09 PROCEDURE — 93971 EXTREMITY STUDY: CPT

## 2022-01-03 ENCOUNTER — TRANSCRIBE ORDERS (OUTPATIENT)
Dept: ADMINISTRATIVE | Facility: HOSPITAL | Age: 62
End: 2022-01-03

## 2022-01-03 DIAGNOSIS — Z86.718 HISTORY OF BLOOD CLOTS: Primary | ICD-10-CM

## 2022-01-13 ENCOUNTER — HOSPITAL ENCOUNTER (OUTPATIENT)
Dept: CARDIOLOGY | Facility: HOSPITAL | Age: 62
Discharge: HOME | End: 2022-01-13
Admitting: NURSE PRACTITIONER

## 2022-01-13 DIAGNOSIS — Z86.718 HISTORY OF BLOOD CLOTS: ICD-10-CM

## 2022-01-13 PROCEDURE — 93971 EXTREMITY STUDY: CPT

## 2022-11-30 ENCOUNTER — HOSPITAL ENCOUNTER (EMERGENCY)
Facility: HOSPITAL | Age: 62
Discharge: HOME | End: 2022-11-30
Admitting: EMERGENCY MEDICINE

## 2022-11-30 ENCOUNTER — APPOINTMENT (OUTPATIENT)
Dept: GENERAL RADIOLOGY | Facility: HOSPITAL | Age: 62
End: 2022-11-30

## 2022-11-30 ENCOUNTER — APPOINTMENT (OUTPATIENT)
Dept: CT IMAGING | Facility: HOSPITAL | Age: 62
End: 2022-11-30

## 2022-11-30 VITALS
DIASTOLIC BLOOD PRESSURE: 60 MMHG | OXYGEN SATURATION: 99 % | BODY MASS INDEX: 21.72 KG/M2 | WEIGHT: 122.58 LBS | SYSTOLIC BLOOD PRESSURE: 102 MMHG | RESPIRATION RATE: 18 BRPM | HEART RATE: 89 BPM | TEMPERATURE: 97.4 F | HEIGHT: 63 IN

## 2022-11-30 DIAGNOSIS — R74.8 ELEVATED LIVER ENZYMES: ICD-10-CM

## 2022-11-30 DIAGNOSIS — J44.1 COPD WITH ACUTE EXACERBATION: Primary | ICD-10-CM

## 2022-11-30 DIAGNOSIS — R06.00 DYSPNEA, UNSPECIFIED TYPE: ICD-10-CM

## 2022-11-30 LAB
ALBUMIN SERPL-MCNC: 3.2 G/DL (ref 3.5–5.2)
ALBUMIN/GLOB SERPL: 0.8 G/DL
ALP SERPL-CCNC: 202 U/L (ref 39–117)
ALT SERPL W P-5'-P-CCNC: 20 U/L (ref 1–33)
ANION GAP SERPL CALCULATED.3IONS-SCNC: 12 MMOL/L (ref 5–15)
APTT PPP: 41.6 SECONDS (ref 24–31)
AST SERPL-CCNC: 85 U/L (ref 1–32)
BASOPHILS # BLD AUTO: 0.1 10*3/MM3 (ref 0–0.2)
BASOPHILS NFR BLD AUTO: 0.9 % (ref 0–1.5)
BILIRUB SERPL-MCNC: 3.5 MG/DL (ref 0–1.2)
BUN SERPL-MCNC: 8 MG/DL (ref 8–23)
BUN/CREAT SERPL: 12.3 (ref 7–25)
CALCIUM SPEC-SCNC: 9.6 MG/DL (ref 8.6–10.5)
CHLORIDE SERPL-SCNC: 105 MMOL/L (ref 98–107)
CO2 SERPL-SCNC: 23 MMOL/L (ref 22–29)
CREAT SERPL-MCNC: 0.65 MG/DL (ref 0.57–1)
D DIMER PPP FEU-MCNC: 2.97 MG/L (FEU) (ref 0–0.6)
DEPRECATED RDW RBC AUTO: 63 FL (ref 37–54)
EGFRCR SERPLBLD CKD-EPI 2021: 100.9 ML/MIN/1.73
EOSINOPHIL # BLD AUTO: 0.2 10*3/MM3 (ref 0–0.4)
EOSINOPHIL NFR BLD AUTO: 1.7 % (ref 0.3–6.2)
ERYTHROCYTE [DISTWIDTH] IN BLOOD BY AUTOMATED COUNT: 15.8 % (ref 12.3–15.4)
GLOBULIN UR ELPH-MCNC: 4.2 GM/DL
GLUCOSE SERPL-MCNC: 111 MG/DL (ref 65–99)
HCT VFR BLD AUTO: 37.1 % (ref 34–46.6)
HGB BLD-MCNC: 12.2 G/DL (ref 12–15.9)
INR PPP: 1.52 (ref 0.93–1.1)
LYMPHOCYTES # BLD AUTO: 2.6 10*3/MM3 (ref 0.7–3.1)
LYMPHOCYTES NFR BLD AUTO: 17.9 % (ref 19.6–45.3)
MCH RBC QN AUTO: 34.9 PG (ref 26.6–33)
MCHC RBC AUTO-ENTMCNC: 32.9 G/DL (ref 31.5–35.7)
MCV RBC AUTO: 105.9 FL (ref 79–97)
MONOCYTES # BLD AUTO: 1.3 10*3/MM3 (ref 0.1–0.9)
MONOCYTES NFR BLD AUTO: 8.8 % (ref 5–12)
NEUTROPHILS NFR BLD AUTO: 10.2 10*3/MM3 (ref 1.7–7)
NEUTROPHILS NFR BLD AUTO: 70.7 % (ref 42.7–76)
NRBC BLD AUTO-RTO: 0.1 /100 WBC (ref 0–0.2)
NT-PROBNP SERPL-MCNC: 331.5 PG/ML (ref 0–900)
PLATELET # BLD AUTO: 180 10*3/MM3 (ref 140–450)
PMV BLD AUTO: 10.1 FL (ref 6–12)
POTASSIUM SERPL-SCNC: 4.2 MMOL/L (ref 3.5–5.2)
PROT SERPL-MCNC: 7.4 G/DL (ref 6–8.5)
PROTHROMBIN TIME: 15.3 SECONDS (ref 9.6–11.7)
RBC # BLD AUTO: 3.5 10*6/MM3 (ref 3.77–5.28)
SODIUM SERPL-SCNC: 140 MMOL/L (ref 136–145)
TROPONIN T SERPL-MCNC: <0.01 NG/ML (ref 0–0.03)
WBC NRBC COR # BLD: 14.4 10*3/MM3 (ref 3.4–10.8)

## 2022-11-30 PROCEDURE — 84484 ASSAY OF TROPONIN QUANT: CPT | Performed by: NURSE PRACTITIONER

## 2022-11-30 PROCEDURE — 85730 THROMBOPLASTIN TIME PARTIAL: CPT | Performed by: NURSE PRACTITIONER

## 2022-11-30 PROCEDURE — 0 IOPAMIDOL PER 1 ML: Performed by: NURSE PRACTITIONER

## 2022-11-30 PROCEDURE — 80053 COMPREHEN METABOLIC PANEL: CPT | Performed by: NURSE PRACTITIONER

## 2022-11-30 PROCEDURE — 71046 X-RAY EXAM CHEST 2 VIEWS: CPT

## 2022-11-30 PROCEDURE — 99284 EMERGENCY DEPT VISIT MOD MDM: CPT

## 2022-11-30 PROCEDURE — 85379 FIBRIN DEGRADATION QUANT: CPT | Performed by: NURSE PRACTITIONER

## 2022-11-30 PROCEDURE — 71275 CT ANGIOGRAPHY CHEST: CPT

## 2022-11-30 PROCEDURE — 85025 COMPLETE CBC W/AUTO DIFF WBC: CPT | Performed by: NURSE PRACTITIONER

## 2022-11-30 PROCEDURE — 93005 ELECTROCARDIOGRAM TRACING: CPT | Performed by: NURSE PRACTITIONER

## 2022-11-30 PROCEDURE — 83880 ASSAY OF NATRIURETIC PEPTIDE: CPT | Performed by: NURSE PRACTITIONER

## 2022-11-30 PROCEDURE — 99283 EMERGENCY DEPT VISIT LOW MDM: CPT

## 2022-11-30 PROCEDURE — 25010000002 METHYLPREDNISOLONE PER 125 MG: Performed by: NURSE PRACTITIONER

## 2022-11-30 PROCEDURE — 96374 THER/PROPH/DIAG INJ IV PUSH: CPT

## 2022-11-30 PROCEDURE — 85610 PROTHROMBIN TIME: CPT | Performed by: NURSE PRACTITIONER

## 2022-11-30 RX ORDER — METHYLPREDNISOLONE SODIUM SUCCINATE 125 MG/2ML
125 INJECTION, POWDER, LYOPHILIZED, FOR SOLUTION INTRAMUSCULAR; INTRAVENOUS ONCE
Status: COMPLETED | OUTPATIENT
Start: 2022-11-30 | End: 2022-11-30

## 2022-11-30 RX ORDER — PREDNISONE 20 MG/1
20 TABLET ORAL DAILY
Qty: 5 TABLET | Refills: 0 | Status: SHIPPED | OUTPATIENT
Start: 2022-11-30 | End: 2023-02-15

## 2022-11-30 RX ORDER — ALBUTEROL SULFATE 90 UG/1
2 AEROSOL, METERED RESPIRATORY (INHALATION) EVERY 4 HOURS PRN
Qty: 18 G | Refills: 0 | Status: SHIPPED | OUTPATIENT
Start: 2022-11-30

## 2022-11-30 RX ORDER — SODIUM CHLORIDE 0.9 % (FLUSH) 0.9 %
10 SYRINGE (ML) INJECTION AS NEEDED
Status: DISCONTINUED | OUTPATIENT
Start: 2022-11-30 | End: 2022-11-30 | Stop reason: HOSPADM

## 2022-11-30 RX ADMIN — IOPAMIDOL 100 ML: 755 INJECTION, SOLUTION INTRAVENOUS at 14:31

## 2022-11-30 RX ADMIN — METHYLPREDNISOLONE SODIUM SUCCINATE 125 MG: 125 INJECTION, POWDER, FOR SOLUTION INTRAMUSCULAR; INTRAVENOUS at 15:36

## 2022-12-13 LAB — QT INTERVAL: 317 MS

## 2023-01-23 ENCOUNTER — TRANSCRIBE ORDERS (OUTPATIENT)
Dept: ADMINISTRATIVE | Facility: HOSPITAL | Age: 63
End: 2023-01-23
Payer: MEDICARE

## 2023-01-23 DIAGNOSIS — R18.8 ASCITES OF LIVER: ICD-10-CM

## 2023-01-23 DIAGNOSIS — K76.89 LIVER NODULE: Primary | ICD-10-CM

## 2023-02-01 ENCOUNTER — OFFICE (OUTPATIENT)
Dept: URBAN - METROPOLITAN AREA CLINIC 64 | Facility: CLINIC | Age: 63
End: 2023-02-01

## 2023-02-01 VITALS
HEIGHT: 63 IN | DIASTOLIC BLOOD PRESSURE: 67 MMHG | HEART RATE: 105 BPM | WEIGHT: 123 LBS | SYSTOLIC BLOOD PRESSURE: 110 MMHG

## 2023-02-01 DIAGNOSIS — R14.0 ABDOMINAL DISTENSION (GASEOUS): ICD-10-CM

## 2023-02-01 DIAGNOSIS — R93.2 ABNORMAL FINDINGS ON DIAGNOSTIC IMAGING OF LIVER AND BILIARY: ICD-10-CM

## 2023-02-01 DIAGNOSIS — I82.409 ACUTE EMBOLISM AND THROMBOSIS OF UNSPECIFIED DEEP VEINS OF U: ICD-10-CM

## 2023-02-01 DIAGNOSIS — Z90.710 ACQUIRED ABSENCE OF BOTH CERVIX AND UTERUS: ICD-10-CM

## 2023-02-01 DIAGNOSIS — G35 MULTIPLE SCLEROSIS: ICD-10-CM

## 2023-02-01 DIAGNOSIS — R18.8 OTHER ASCITES: ICD-10-CM

## 2023-02-01 DIAGNOSIS — J44.9 CHRONIC OBSTRUCTIVE PULMONARY DISEASE, UNSPECIFIED: ICD-10-CM

## 2023-02-01 DIAGNOSIS — F10.10 ALCOHOL ABUSE, UNCOMPLICATED: ICD-10-CM

## 2023-02-01 DIAGNOSIS — F17.200 NICOTINE DEPENDENCE, UNSPECIFIED, UNCOMPLICATED: ICD-10-CM

## 2023-02-01 DIAGNOSIS — Z12.11 ENCOUNTER FOR SCREENING FOR MALIGNANT NEOPLASM OF COLON: ICD-10-CM

## 2023-02-01 PROCEDURE — 99214 OFFICE O/P EST MOD 30 MIN: CPT

## 2023-02-03 ENCOUNTER — TELEPHONE (OUTPATIENT)
Dept: INFUSION THERAPY | Facility: HOSPITAL | Age: 63
End: 2023-02-03
Payer: MEDICARE

## 2023-02-07 ENCOUNTER — TRANSCRIBE ORDERS (OUTPATIENT)
Dept: ADMINISTRATIVE | Facility: HOSPITAL | Age: 63
End: 2023-02-07
Payer: MEDICARE

## 2023-02-07 DIAGNOSIS — R93.2 ABNORMAL LIVER ULTRASOUND: ICD-10-CM

## 2023-02-07 DIAGNOSIS — R18.8 ASCITES OF LIVER: Primary | ICD-10-CM

## 2023-02-09 ENCOUNTER — TRANSCRIBE ORDERS (OUTPATIENT)
Dept: ADMINISTRATIVE | Facility: HOSPITAL | Age: 63
End: 2023-02-09
Payer: MEDICARE

## 2023-02-09 DIAGNOSIS — Z12.11 SPECIAL SCREENING FOR MALIGNANT NEOPLASMS, COLON: ICD-10-CM

## 2023-02-09 DIAGNOSIS — R93.2 ABNORMAL FINDINGS ON DIAGNOSTIC IMAGING OF LIVER AND BILIARY TRACT: Primary | ICD-10-CM

## 2023-02-09 DIAGNOSIS — R18.8 OTHER ASCITES: ICD-10-CM

## 2023-02-14 DIAGNOSIS — R18.8 OTHER ASCITES: Primary | ICD-10-CM

## 2023-02-14 RX ORDER — LIDOCAINE HYDROCHLORIDE 10 MG/ML
10 INJECTION, SOLUTION INFILTRATION; PERINEURAL AS NEEDED
Status: CANCELLED | OUTPATIENT
Start: 2023-02-15

## 2023-02-14 RX ORDER — LIDOCAINE HYDROCHLORIDE 20 MG/ML
10 INJECTION, SOLUTION INFILTRATION; PERINEURAL AS NEEDED
Status: CANCELLED | OUTPATIENT
Start: 2023-02-15

## 2023-02-14 RX ORDER — ALBUMIN (HUMAN) 12.5 G/50ML
12.5 SOLUTION INTRAVENOUS DAILY PRN
Status: CANCELLED | OUTPATIENT
Start: 2023-02-15

## 2023-02-14 RX ORDER — ALBUMIN (HUMAN) 12.5 G/50ML
25 SOLUTION INTRAVENOUS DAILY PRN
Status: CANCELLED | OUTPATIENT
Start: 2023-02-15

## 2023-02-15 ENCOUNTER — HOSPITAL ENCOUNTER (OUTPATIENT)
Dept: INFUSION THERAPY | Facility: HOSPITAL | Age: 63
Discharge: HOME | End: 2023-02-15
Admitting: INTERNAL MEDICINE
Payer: MEDICARE

## 2023-02-15 VITALS
OXYGEN SATURATION: 99 % | DIASTOLIC BLOOD PRESSURE: 60 MMHG | HEART RATE: 86 BPM | RESPIRATION RATE: 18 BRPM | SYSTOLIC BLOOD PRESSURE: 122 MMHG

## 2023-02-15 DIAGNOSIS — R18.8 OTHER ASCITES: ICD-10-CM

## 2023-02-15 LAB
ALBUMIN FLD-MCNC: 0.9 G/DL
APPEARANCE FLD: CLEAR
COLOR FLD: YELLOW
LYMPHOCYTES NFR FLD MANUAL: 33 %
MESOTHL CELL NFR FLD MANUAL: 10 %
METHOD: NORMAL
MONOCYTES NFR FLD: 47 %
NEUTROPHILS NFR FLD MANUAL: 10 %
NUC CELL # FLD: 296 /MM3
PROT FLD-MCNC: 1.8 G/DL

## 2023-02-15 PROCEDURE — 87070 CULTURE OTHR SPECIMN AEROBIC: CPT | Performed by: INTERNAL MEDICINE

## 2023-02-15 PROCEDURE — 76942 ECHO GUIDE FOR BIOPSY: CPT

## 2023-02-15 PROCEDURE — 83690 ASSAY OF LIPASE: CPT | Performed by: INTERNAL MEDICINE

## 2023-02-15 PROCEDURE — 84157 ASSAY OF PROTEIN OTHER: CPT | Performed by: INTERNAL MEDICINE

## 2023-02-15 PROCEDURE — 87205 SMEAR GRAM STAIN: CPT | Performed by: INTERNAL MEDICINE

## 2023-02-15 PROCEDURE — 25010000002 ALBUMIN HUMAN 25% PER 50 ML: Performed by: INTERNAL MEDICINE

## 2023-02-15 PROCEDURE — P9047 ALBUMIN (HUMAN), 25%, 50ML: HCPCS | Performed by: INTERNAL MEDICINE

## 2023-02-15 PROCEDURE — 96365 THER/PROPH/DIAG IV INF INIT: CPT

## 2023-02-15 PROCEDURE — 88108 CYTOPATH CONCENTRATE TECH: CPT | Performed by: INTERNAL MEDICINE

## 2023-02-15 PROCEDURE — 82042 OTHER SOURCE ALBUMIN QUAN EA: CPT | Performed by: INTERNAL MEDICINE

## 2023-02-15 PROCEDURE — 89051 BODY FLUID CELL COUNT: CPT | Performed by: INTERNAL MEDICINE

## 2023-02-15 PROCEDURE — 36415 COLL VENOUS BLD VENIPUNCTURE: CPT

## 2023-02-15 RX ORDER — ALBUMIN (HUMAN) 12.5 G/50ML
25 SOLUTION INTRAVENOUS DAILY PRN
Status: DISCONTINUED | OUTPATIENT
Start: 2023-02-15 | End: 2023-02-17 | Stop reason: HOSPADM

## 2023-02-15 RX ORDER — LIDOCAINE HYDROCHLORIDE 10 MG/ML
10 INJECTION, SOLUTION INFILTRATION; PERINEURAL AS NEEDED
Status: DISCONTINUED | OUTPATIENT
Start: 2023-02-15 | End: 2023-02-17 | Stop reason: HOSPADM

## 2023-02-15 RX ORDER — ALBUMIN (HUMAN) 12.5 G/50ML
12.5 SOLUTION INTRAVENOUS DAILY PRN
Status: DISCONTINUED | OUTPATIENT
Start: 2023-02-15 | End: 2023-02-17 | Stop reason: HOSPADM

## 2023-02-15 RX ORDER — LIDOCAINE HYDROCHLORIDE 20 MG/ML
10 INJECTION, SOLUTION INFILTRATION; PERINEURAL AS NEEDED
Status: DISCONTINUED | OUTPATIENT
Start: 2023-02-15 | End: 2023-02-17 | Stop reason: HOSPADM

## 2023-02-15 RX ADMIN — ALBUMIN (HUMAN) 12.5 G: 0.25 INJECTION, SOLUTION INTRAVENOUS at 14:44

## 2023-02-15 RX ADMIN — ALBUMIN (HUMAN) 25 G: 0.25 INJECTION, SOLUTION INTRAVENOUS at 14:13

## 2023-02-15 RX ADMIN — LIDOCAINE HYDROCHLORIDE 10 ML: 20 INJECTION, SOLUTION EPIDURAL; INFILTRATION; INTRACAUDAL; PERINEURAL at 13:40

## 2023-02-16 NOTE — PROCEDURES
"Diagnostic & Therapeutic Bedside Paracentesis With Ultrasound Guidance    Date/Time: 2/15/2023 1:15 PM  Performed by: José Shah APRN  Authorized by: Noah Massey MD   Consent: Written consent obtained.  Risks and benefits: risks, benefits and alternatives were discussed  Consent given by: patient  Patient understanding: patient states understanding of the procedure being performed  Patient consent: the patient's understanding of the procedure matches consent given  Procedure consent: procedure consent matches procedure scheduled  Relevant documents: relevant documents present and verified  Test results: test results available and properly labeled  Site marked: the operative site was marked  Imaging studies: imaging studies available  Required items: required blood products, implants, devices, and special equipment available  Patient identity confirmed: verbally with patient and provided demographic data  Time out: Immediately prior to procedure a \"time out\" was called to verify the correct patient, procedure, equipment, support staff and site/side marked as required.  Initial or subsequent exam: initial  Procedure purpose: diagnostic and therapeutic  Indications: abdominal discomfort secondary to ascites  Anesthesia: local infiltration    Anesthesia:  Local Anesthetic: lidocaine 2% without epinephrine  Anesthetic total: 10 mL    Sedation:  Patient sedated: no    Preparation: Patient was prepped and draped in the usual sterile fashion (All five maximal sterile barriers used- gloves, gown, cap, mask and large sterile sheet).  Needle gauge: 22  Ultrasound guidance: yes  Puncture site: left lower quadrant  Fluid removed: 4500(ml)  Fluid characteristics: Clear, yellow.  Dressing: 4x4 sterile gauze (adhesive bandage)  Patient tolerance: patient tolerated the procedure well with no immediate complications        Electronically signed by MICHAELLE Pal, 02/15/23, 8:55 PM EST.    "

## 2023-02-17 LAB
LAB AP CASE REPORT: NORMAL
PATH REPORT.FINAL DX SPEC: NORMAL
PATH REPORT.GROSS SPEC: NORMAL

## 2023-02-18 LAB
BACTERIA FLD CULT: NORMAL
GRAM STN SPEC: NORMAL
GRAM STN SPEC: NORMAL
LIPASE FLD-CCNC: 55 U/L

## 2023-03-13 ENCOUNTER — HOSPITAL ENCOUNTER (OUTPATIENT)
Dept: INFUSION THERAPY | Facility: HOSPITAL | Age: 63
Discharge: HOME | End: 2023-03-13
Admitting: INTERNAL MEDICINE
Payer: MEDICARE

## 2023-03-13 VITALS
WEIGHT: 111.2 LBS | OXYGEN SATURATION: 100 % | DIASTOLIC BLOOD PRESSURE: 67 MMHG | HEART RATE: 83 BPM | BODY MASS INDEX: 19.7 KG/M2 | SYSTOLIC BLOOD PRESSURE: 120 MMHG | RESPIRATION RATE: 19 BRPM

## 2023-03-13 DIAGNOSIS — R18.8 OTHER ASCITES: Primary | ICD-10-CM

## 2023-03-13 DIAGNOSIS — R18.8 OTHER ASCITES: ICD-10-CM

## 2023-03-13 LAB
DEPRECATED RDW RBC AUTO: 57.8 FL (ref 37–54)
ERYTHROCYTE [DISTWIDTH] IN BLOOD BY AUTOMATED COUNT: 16.5 % (ref 12.3–15.4)
HCT VFR BLD AUTO: 38.7 % (ref 34–46.6)
HGB BLD-MCNC: 12.5 G/DL (ref 12–15.9)
INR PPP: 1.5 (ref 0.93–1.1)
MCH RBC QN AUTO: 33 PG (ref 26.6–33)
MCHC RBC AUTO-ENTMCNC: 32.3 G/DL (ref 31.5–35.7)
MCV RBC AUTO: 102.1 FL (ref 79–97)
PLATELET # BLD AUTO: 113 10*3/MM3 (ref 140–450)
PMV BLD AUTO: 9.1 FL (ref 6–12)
PROTHROMBIN TIME: 15.1 SECONDS (ref 9.6–11.7)
RBC # BLD AUTO: 3.79 10*6/MM3 (ref 3.77–5.28)
WBC NRBC COR # BLD: 6.1 10*3/MM3 (ref 3.4–10.8)

## 2023-03-13 PROCEDURE — 85610 PROTHROMBIN TIME: CPT | Performed by: INTERNAL MEDICINE

## 2023-03-13 PROCEDURE — 76942 ECHO GUIDE FOR BIOPSY: CPT

## 2023-03-13 PROCEDURE — 85027 COMPLETE CBC AUTOMATED: CPT | Performed by: INTERNAL MEDICINE

## 2023-03-13 PROCEDURE — 36415 COLL VENOUS BLD VENIPUNCTURE: CPT

## 2023-03-13 RX ORDER — LIDOCAINE HYDROCHLORIDE 10 MG/ML
10 INJECTION, SOLUTION INFILTRATION; PERINEURAL AS NEEDED
Status: DISCONTINUED | OUTPATIENT
Start: 2023-03-13 | End: 2023-03-15 | Stop reason: HOSPADM

## 2023-03-13 RX ORDER — ALBUMIN (HUMAN) 12.5 G/50ML
25 SOLUTION INTRAVENOUS DAILY PRN
Status: DISCONTINUED | OUTPATIENT
Start: 2023-03-13 | End: 2023-03-15 | Stop reason: HOSPADM

## 2023-03-13 RX ORDER — ALBUMIN (HUMAN) 12.5 G/50ML
25 SOLUTION INTRAVENOUS DAILY PRN
Status: CANCELLED | OUTPATIENT
Start: 2023-03-13

## 2023-03-13 RX ORDER — ALBUMIN (HUMAN) 12.5 G/50ML
12.5 SOLUTION INTRAVENOUS DAILY PRN
Status: DISCONTINUED | OUTPATIENT
Start: 2023-03-13 | End: 2023-03-15 | Stop reason: HOSPADM

## 2023-03-13 RX ORDER — LIDOCAINE HYDROCHLORIDE 20 MG/ML
10 INJECTION, SOLUTION INFILTRATION; PERINEURAL AS NEEDED
Status: DISCONTINUED | OUTPATIENT
Start: 2023-03-13 | End: 2023-03-15 | Stop reason: HOSPADM

## 2023-03-13 RX ORDER — LIDOCAINE HYDROCHLORIDE 10 MG/ML
10 INJECTION, SOLUTION INFILTRATION; PERINEURAL AS NEEDED
Status: CANCELLED | OUTPATIENT
Start: 2023-03-13

## 2023-03-13 RX ORDER — LIDOCAINE HYDROCHLORIDE 20 MG/ML
10 INJECTION, SOLUTION INFILTRATION; PERINEURAL AS NEEDED
Status: CANCELLED | OUTPATIENT
Start: 2023-03-13

## 2023-03-13 RX ORDER — ALBUMIN (HUMAN) 12.5 G/50ML
12.5 SOLUTION INTRAVENOUS DAILY PRN
Status: CANCELLED | OUTPATIENT
Start: 2023-03-13

## 2023-03-13 RX ADMIN — LIDOCAINE HYDROCHLORIDE 10 ML: 20 INJECTION, SOLUTION INFILTRATION; PERINEURAL at 09:15

## 2023-03-13 NOTE — PROCEDURES
"Therapeutic Bedside Paracentesis Without  Radiology    Date/Time: 3/13/2023 9:05 AM  Performed by: oJsé Shah APRN  Authorized by: Noah Massey MD   Consent: Written consent obtained.  Risks and benefits: risks, benefits and alternatives were discussed  Consent given by: patient  Patient consent: the patient's understanding of the procedure matches consent given  Procedure consent: procedure consent matches procedure scheduled  Relevant documents: relevant documents present and verified  Test results: test results available and properly labeled  Site marked: the operative site was marked  Imaging studies: imaging studies available  Required items: required blood products, implants, devices, and special equipment available  Patient identity confirmed: verbally with patient and provided demographic data  Time out: Immediately prior to procedure a \"time out\" was called to verify the correct patient, procedure, equipment, support staff and site/side marked as required.  Initial or subsequent exam: subsequent  Procedure purpose: therapeutic  Indications: abdominal discomfort secondary to ascites  Anesthesia: local infiltration    Anesthesia:  Local Anesthetic: lidocaine 2% without epinephrine  Anesthetic total: 15 mL    Sedation:  Patient sedated: no    Preparation: Patient was prepped and draped in the usual sterile fashion (All five maximal sterile barriers used- gloves, gown, cap, mask and large sterile sheet).  Needle gauge: 22  Ultrasound guidance: yes  Puncture site: left lower quadrant (Avoided right side as patient's liver on ultrasound sits very low on her right side.  )  Fluid removed: 3900(ml)  Fluid characteristics: Clear, yellow.  Dressing: 4x4 sterile gauze (adhesive bandage)  Patient tolerance: patient tolerated the procedure well with no immediate complications        Electronically signed by MICHAELLE Pal, 03/13/23, 10:01 AM EDT.    "

## 2023-03-14 ENCOUNTER — HOSPITAL ENCOUNTER (OUTPATIENT)
Dept: CT IMAGING | Facility: HOSPITAL | Age: 63
Discharge: HOME | End: 2023-03-14
Payer: MEDICARE

## 2023-03-14 DIAGNOSIS — Z12.11 SPECIAL SCREENING FOR MALIGNANT NEOPLASMS, COLON: ICD-10-CM

## 2023-03-14 DIAGNOSIS — R18.8 OTHER ASCITES: ICD-10-CM

## 2023-03-14 DIAGNOSIS — R93.2 ABNORMAL FINDINGS ON DIAGNOSTIC IMAGING OF LIVER AND BILIARY TRACT: ICD-10-CM

## 2023-03-14 LAB
CREAT BLDA-MCNC: 0.5 MG/DL (ref 0.6–1.3)
EGFRCR SERPLBLD CKD-EPI 2021: 106.9 ML/MIN/1.73

## 2023-03-14 PROCEDURE — 82565 ASSAY OF CREATININE: CPT

## 2023-03-14 PROCEDURE — 74170 CT ABD WO CNTRST FLWD CNTRST: CPT

## 2023-03-14 PROCEDURE — 25510000001 IOPAMIDOL PER 1 ML

## 2023-03-14 RX ADMIN — IOPAMIDOL 100 ML: 755 INJECTION, SOLUTION INTRAVENOUS at 11:53

## 2023-03-30 RX ORDER — ALBUMIN (HUMAN) 12.5 G/50ML
12.5 SOLUTION INTRAVENOUS DAILY PRN
Status: CANCELLED | OUTPATIENT
Start: 2023-04-03

## 2023-03-30 RX ORDER — LIDOCAINE HYDROCHLORIDE 20 MG/ML
10 INJECTION, SOLUTION INFILTRATION; PERINEURAL AS NEEDED
Status: CANCELLED | OUTPATIENT
Start: 2023-04-03

## 2023-03-30 RX ORDER — ALBUMIN (HUMAN) 12.5 G/50ML
25 SOLUTION INTRAVENOUS DAILY PRN
Status: CANCELLED | OUTPATIENT
Start: 2023-04-03

## 2023-03-30 RX ORDER — LIDOCAINE HYDROCHLORIDE 10 MG/ML
10 INJECTION, SOLUTION INFILTRATION; PERINEURAL AS NEEDED
Status: CANCELLED | OUTPATIENT
Start: 2023-04-03

## 2023-04-03 ENCOUNTER — HOSPITAL ENCOUNTER (OUTPATIENT)
Dept: INFUSION THERAPY | Facility: HOSPITAL | Age: 63
Discharge: HOME | End: 2023-04-03
Payer: MEDICARE

## 2023-04-03 ENCOUNTER — HOSPITAL ENCOUNTER (OUTPATIENT)
Dept: INTERVENTIONAL RADIOLOGY/VASCULAR | Facility: HOSPITAL | Age: 63
Discharge: HOME | End: 2023-04-03
Payer: MEDICARE

## 2023-04-03 VITALS
RESPIRATION RATE: 20 BRPM | DIASTOLIC BLOOD PRESSURE: 58 MMHG | HEART RATE: 90 BPM | OXYGEN SATURATION: 99 % | SYSTOLIC BLOOD PRESSURE: 106 MMHG | TEMPERATURE: 98.4 F

## 2023-04-03 DIAGNOSIS — Z87.898 HX OF ASCITES: ICD-10-CM

## 2023-04-03 PROCEDURE — 76942 ECHO GUIDE FOR BIOPSY: CPT

## 2023-04-03 RX ORDER — ALBUMIN (HUMAN) 12.5 G/50ML
25 SOLUTION INTRAVENOUS DAILY PRN
Status: DISCONTINUED | OUTPATIENT
Start: 2023-04-03 | End: 2023-04-05 | Stop reason: HOSPADM

## 2023-04-03 RX ORDER — LIDOCAINE HYDROCHLORIDE 20 MG/ML
10 INJECTION, SOLUTION INFILTRATION; PERINEURAL AS NEEDED
Status: DISCONTINUED | OUTPATIENT
Start: 2023-04-03 | End: 2023-04-05 | Stop reason: HOSPADM

## 2023-04-03 RX ORDER — LIDOCAINE HYDROCHLORIDE 10 MG/ML
10 INJECTION, SOLUTION INFILTRATION; PERINEURAL AS NEEDED
Status: DISCONTINUED | OUTPATIENT
Start: 2023-04-03 | End: 2023-04-05 | Stop reason: HOSPADM

## 2023-04-03 RX ORDER — ALBUMIN (HUMAN) 12.5 G/50ML
12.5 SOLUTION INTRAVENOUS DAILY PRN
Status: DISCONTINUED | OUTPATIENT
Start: 2023-04-03 | End: 2023-04-05 | Stop reason: HOSPADM

## 2023-04-03 RX ADMIN — LIDOCAINE HYDROCHLORIDE 10 ML: 20 INJECTION, SOLUTION EPIDURAL; INFILTRATION; INTRACAUDAL; PERINEURAL at 08:05

## 2023-04-03 NOTE — PRE-PROCEDURE NOTE
.    [facility]   Interventional Radiology H&P    Patient Name: Nelda Alcocer  : 1960  MRN: [id number]  Primary Care Physician:  Kathy Hayes APRN  Referring Physician: Noah Massey, *  Date of admission: 4/3/2023    Subjective   Subjective     HPI:  Nelda Alcocer is a 63 y.o. female with ascites  Review of Systems:   Constitutional no fever,  no weight loss       Otolaryngeal no difficulty swallowing   Cardiovascular no chest pain   Pulmonary no cough, no sputum production   Gastrointestinal no constipation, no diarrhea                         Personal History       Past Medical/Surgical History:   Past Medical History:   Diagnosis Date   • COPD (chronic obstructive pulmonary disease)    • H/O blood clots     left leg   • Multiple sclerosis    • Vertigo      Past Surgical History:   Procedure Laterality Date   • HAND SURGERY Right    • HYSTERECTOMY         Social History:  reports that she has been smoking cigarettes. She has a 52.50 pack-year smoking history. She has never used smokeless tobacco. She reports that she does not drink alcohol and does not use drugs.    Medications:  (Not in a hospital admission)    Current medications:    Current IV drips:  No current facility-administered medications for this encounter.      Allergies:  No Known Allergies    Objective    Objective     Vitals:          Physical Exam:   Constitutional: Awake, alert, No acute distress    Respiratory: Clear to auscultation bilaterally, nonlabored respirations    Cardiovascular: RRR, no murmurs, rubs, or gallops, palpable pedal pulses bilaterally   Gastrointestinal: Positive bowel sounds, soft, nontender, nondistended        ASA SCALE ASSESSMENT:  2-Mild to moderate systemic disease, medically well controlled, with no functional limitation    MALLAMPATI CLASSIFICATION:  2-Able to visualize the soft palate, fauces, uvula. The anterior & posterior tonsilar pillars are hidden by the tongue.       Result  Review        Result Review:     No results found for: NA    No results found for: K    No results found for: CL    No results found for: PLASMABICARB    No results found for: BUN    No results found for: CREATININE    No results found for: CALCIUM        No components found for: GLUCOSE.*                 Assessment / Plan     Assesment:  ascites      Plan:   paracentesis    The risks and benefits of the procedure were discussed with the patient.    Electronically signed by Miguel Chan III, MD, 04/03/23, 7:55 AM EDT.

## 2023-04-24 DIAGNOSIS — R18.8 OTHER ASCITES: Primary | ICD-10-CM

## 2023-04-24 RX ORDER — LIDOCAINE HYDROCHLORIDE 20 MG/ML
10 INJECTION, SOLUTION INFILTRATION; PERINEURAL AS NEEDED
Status: CANCELLED | OUTPATIENT
Start: 2023-04-26

## 2023-04-24 RX ORDER — LIDOCAINE HYDROCHLORIDE 10 MG/ML
10 INJECTION, SOLUTION INFILTRATION; PERINEURAL AS NEEDED
Status: CANCELLED | OUTPATIENT
Start: 2023-04-26

## 2023-04-24 RX ORDER — ALBUMIN (HUMAN) 12.5 G/50ML
12.5 SOLUTION INTRAVENOUS DAILY PRN
Status: CANCELLED | OUTPATIENT
Start: 2023-04-26

## 2023-04-24 RX ORDER — ALBUMIN (HUMAN) 12.5 G/50ML
25 SOLUTION INTRAVENOUS DAILY PRN
Status: CANCELLED | OUTPATIENT
Start: 2023-04-26

## 2023-04-26 ENCOUNTER — HOSPITAL ENCOUNTER (OUTPATIENT)
Dept: INFUSION THERAPY | Facility: HOSPITAL | Age: 63
Discharge: HOME | End: 2023-04-26
Payer: MEDICARE

## 2023-04-26 ENCOUNTER — HOSPITAL ENCOUNTER (OUTPATIENT)
Dept: INTERVENTIONAL RADIOLOGY/VASCULAR | Facility: HOSPITAL | Age: 63
Discharge: HOME | End: 2023-04-26
Payer: MEDICARE

## 2023-04-26 VITALS
DIASTOLIC BLOOD PRESSURE: 52 MMHG | SYSTOLIC BLOOD PRESSURE: 105 MMHG | RESPIRATION RATE: 16 BRPM | OXYGEN SATURATION: 99 % | HEART RATE: 91 BPM

## 2023-04-26 DIAGNOSIS — Z87.898 HISTORY OF ASCITES: ICD-10-CM

## 2023-04-26 DIAGNOSIS — R18.8 OTHER ASCITES: ICD-10-CM

## 2023-04-26 LAB
DEPRECATED RDW RBC AUTO: 59.9 FL (ref 37–54)
ERYTHROCYTE [DISTWIDTH] IN BLOOD BY AUTOMATED COUNT: 16.4 % (ref 12.3–15.4)
HCT VFR BLD AUTO: 35.9 % (ref 34–46.6)
HGB BLD-MCNC: 12 G/DL (ref 12–15.9)
INR PPP: 1.36 (ref 0.93–1.1)
MCH RBC QN AUTO: 33.6 PG (ref 26.6–33)
MCHC RBC AUTO-ENTMCNC: 33.5 G/DL (ref 31.5–35.7)
MCV RBC AUTO: 100.1 FL (ref 79–97)
PLATELET # BLD AUTO: 113 10*3/MM3 (ref 140–450)
PMV BLD AUTO: 9.2 FL (ref 6–12)
PROTHROMBIN TIME: 14.3 SECONDS (ref 9.6–11.7)
RBC # BLD AUTO: 3.58 10*6/MM3 (ref 3.77–5.28)
WBC NRBC COR # BLD: 8.3 10*3/MM3 (ref 3.4–10.8)

## 2023-04-26 PROCEDURE — 96365 THER/PROPH/DIAG IV INF INIT: CPT

## 2023-04-26 PROCEDURE — 63710000001 SILVER NITRATE 75-25 % MISC: Performed by: RADIOLOGY

## 2023-04-26 PROCEDURE — 76942 ECHO GUIDE FOR BIOPSY: CPT

## 2023-04-26 PROCEDURE — P9047 ALBUMIN (HUMAN), 25%, 50ML: HCPCS | Performed by: INTERNAL MEDICINE

## 2023-04-26 PROCEDURE — 85027 COMPLETE CBC AUTOMATED: CPT | Performed by: INTERNAL MEDICINE

## 2023-04-26 PROCEDURE — 96372 THER/PROPH/DIAG INJ SC/IM: CPT

## 2023-04-26 PROCEDURE — A9270 NON-COVERED ITEM OR SERVICE: HCPCS | Performed by: RADIOLOGY

## 2023-04-26 PROCEDURE — 25010000002 ALBUMIN HUMAN 25% PER 50 ML: Performed by: INTERNAL MEDICINE

## 2023-04-26 PROCEDURE — 36415 COLL VENOUS BLD VENIPUNCTURE: CPT

## 2023-04-26 PROCEDURE — 85610 PROTHROMBIN TIME: CPT | Performed by: INTERNAL MEDICINE

## 2023-04-26 RX ORDER — ALBUMIN (HUMAN) 12.5 G/50ML
25 SOLUTION INTRAVENOUS DAILY PRN
Status: DISCONTINUED | OUTPATIENT
Start: 2023-04-26 | End: 2023-04-28 | Stop reason: HOSPADM

## 2023-04-26 RX ORDER — LIDOCAINE HYDROCHLORIDE 20 MG/ML
10 INJECTION, SOLUTION INFILTRATION; PERINEURAL AS NEEDED
Status: DISCONTINUED | OUTPATIENT
Start: 2023-04-26 | End: 2023-04-28 | Stop reason: HOSPADM

## 2023-04-26 RX ORDER — ALBUMIN (HUMAN) 12.5 G/50ML
12.5 SOLUTION INTRAVENOUS DAILY PRN
Status: DISCONTINUED | OUTPATIENT
Start: 2023-04-26 | End: 2023-04-28 | Stop reason: HOSPADM

## 2023-04-26 RX ORDER — LIDOCAINE HYDROCHLORIDE 10 MG/ML
10 INJECTION, SOLUTION INFILTRATION; PERINEURAL AS NEEDED
Status: DISCONTINUED | OUTPATIENT
Start: 2023-04-26 | End: 2023-04-28 | Stop reason: HOSPADM

## 2023-04-26 RX ADMIN — SILVER NITRATE APPLICATORS 1 APPLICATION: 25; 75 STICK TOPICAL at 14:45

## 2023-04-26 RX ADMIN — LIDOCAINE HYDROCHLORIDE 10 ML: 20 INJECTION, SOLUTION EPIDURAL; INFILTRATION; INTRACAUDAL; PERINEURAL at 12:35

## 2023-04-26 RX ADMIN — ALBUMIN (HUMAN) 12.5 G: 0.25 INJECTION, SOLUTION INTRAVENOUS at 13:33

## 2023-04-26 RX ADMIN — ALBUMIN (HUMAN) 25 G: 0.25 INJECTION, SOLUTION INTRAVENOUS at 13:00

## 2023-04-26 NOTE — PROGRESS NOTES
Patient was bleeding after initial stick from Paracentesis by MD. After the catheter was pulled, the patient continued to bleed. Pressure was held at the site for 45 min. Turned on Lt side and ice also applied by RN. Dr. Sultana came to re-evaluate patient. Patient still actively bleeding. Silver nitrate ordered from the pharmacy. Used by MD x2. Patient still bleeding. BOVI obtained from IR and used by MD, patient was still actively bleeding.  MD used 10ml 2% lido with EPI and put in one stitch. Bleeding significantly slowed and patient educated by MD and RN about the site and the dressing and the after care. Patient made follow up appt to have dressing cleaned and changed per MD. Patients Vitals were stable.

## 2023-04-28 ENCOUNTER — HOSPITAL ENCOUNTER (OUTPATIENT)
Dept: INFUSION THERAPY | Facility: HOSPITAL | Age: 63
Discharge: HOME | End: 2023-04-28
Payer: MEDICARE

## 2023-04-28 DIAGNOSIS — K70.31 ASCITES DUE TO ALCOHOLIC CIRRHOSIS: ICD-10-CM

## 2023-04-28 DIAGNOSIS — K70.31 ALCOHOLIC CIRRHOSIS OF LIVER WITH ASCITES: ICD-10-CM

## 2023-04-28 PROBLEM — R18.8 ASCITES: Status: ACTIVE | Noted: 2023-04-28

## 2023-04-28 PROBLEM — K70.30 CIRRHOSIS, ALCOHOLIC: Status: ACTIVE | Noted: 2023-04-28

## 2023-04-28 PROCEDURE — G0463 HOSPITAL OUTPT CLINIC VISIT: HCPCS

## 2023-05-03 ENCOUNTER — OFFICE (OUTPATIENT)
Dept: URBAN - METROPOLITAN AREA CLINIC 64 | Facility: CLINIC | Age: 63
End: 2023-05-03

## 2023-05-03 ENCOUNTER — DOCUMENTATION (OUTPATIENT)
Dept: GASTROENTEROLOGY | Facility: HOSPITAL | Age: 63
End: 2023-05-03
Payer: MEDICARE

## 2023-05-03 ENCOUNTER — HOSPITAL ENCOUNTER (OUTPATIENT)
Dept: INFUSION THERAPY | Facility: HOSPITAL | Age: 63
Discharge: HOME | End: 2023-05-03
Admitting: RADIOLOGY
Payer: MEDICARE

## 2023-05-03 VITALS
WEIGHT: 115 LBS | HEIGHT: 63 IN | HEART RATE: 88 BPM | DIASTOLIC BLOOD PRESSURE: 65 MMHG | SYSTOLIC BLOOD PRESSURE: 103 MMHG

## 2023-05-03 DIAGNOSIS — K70.11 ALCOHOLIC HEPATITIS WITH ASCITES: ICD-10-CM

## 2023-05-03 DIAGNOSIS — K70.31 ALCOHOLIC CIRRHOSIS OF LIVER WITH ASCITES: ICD-10-CM

## 2023-05-03 PROCEDURE — G0463 HOSPITAL OUTPT CLINIC VISIT: HCPCS

## 2023-05-03 PROCEDURE — 99214 OFFICE O/P EST MOD 30 MIN: CPT

## 2023-05-03 RX ORDER — ZINC GLUCONATE 50 MG
50 TABLET ORAL
Qty: 90 | Refills: 3 | Status: COMPLETED
Start: 2023-05-03 | End: 2023-08-24

## 2023-05-03 RX ORDER — THIAMINE HCL 50 MG
50 TABLET ORAL
Qty: 90 | Refills: 3 | Status: COMPLETED
Start: 2023-05-03 | End: 2023-08-24

## 2023-05-08 RX ORDER — SPIRONOLACTONE 100 MG/1
TABLET, FILM COATED ORAL
Qty: 30 | Refills: 5 | Status: ACTIVE

## 2023-05-08 RX ORDER — FUROSEMIDE 40 MG/1
TABLET ORAL
Qty: 30 | Refills: 5 | Status: ACTIVE

## 2023-05-17 ENCOUNTER — HOSPITAL ENCOUNTER (OUTPATIENT)
Dept: INFUSION THERAPY | Facility: HOSPITAL | Age: 63
Discharge: HOME | End: 2023-05-17
Payer: MEDICARE

## 2023-05-17 VITALS
RESPIRATION RATE: 15 BRPM | SYSTOLIC BLOOD PRESSURE: 99 MMHG | DIASTOLIC BLOOD PRESSURE: 46 MMHG | HEART RATE: 87 BPM | TEMPERATURE: 97.9 F | OXYGEN SATURATION: 94 %

## 2023-05-17 DIAGNOSIS — K70.31 ASCITES DUE TO ALCOHOLIC CIRRHOSIS: ICD-10-CM

## 2023-05-17 DIAGNOSIS — K70.31 ASCITES DUE TO ALCOHOLIC CIRRHOSIS: Primary | ICD-10-CM

## 2023-05-17 PROCEDURE — 76942 ECHO GUIDE FOR BIOPSY: CPT

## 2023-05-17 RX ORDER — LIDOCAINE HYDROCHLORIDE 10 MG/ML
10 INJECTION, SOLUTION INFILTRATION; PERINEURAL AS NEEDED
Status: CANCELLED | OUTPATIENT
Start: 2023-05-17

## 2023-05-17 RX ORDER — ALBUMIN (HUMAN) 12.5 G/50ML
25 SOLUTION INTRAVENOUS DAILY PRN
Status: DISCONTINUED | OUTPATIENT
Start: 2023-05-17 | End: 2023-05-19 | Stop reason: HOSPADM

## 2023-05-17 RX ORDER — ALBUMIN (HUMAN) 12.5 G/50ML
12.5 SOLUTION INTRAVENOUS DAILY PRN
Status: CANCELLED | OUTPATIENT
Start: 2023-05-17

## 2023-05-17 RX ORDER — ALBUMIN (HUMAN) 12.5 G/50ML
25 SOLUTION INTRAVENOUS DAILY PRN
Status: CANCELLED | OUTPATIENT
Start: 2023-05-17

## 2023-05-17 RX ORDER — LIDOCAINE HYDROCHLORIDE AND EPINEPHRINE 10; 10 MG/ML; UG/ML
10 INJECTION, SOLUTION INFILTRATION; PERINEURAL AS NEEDED
Status: CANCELLED | OUTPATIENT
Start: 2023-05-17

## 2023-05-17 RX ORDER — LIDOCAINE HYDROCHLORIDE 10 MG/ML
10 INJECTION, SOLUTION INFILTRATION; PERINEURAL AS NEEDED
Status: DISCONTINUED | OUTPATIENT
Start: 2023-05-17 | End: 2023-05-19 | Stop reason: HOSPADM

## 2023-05-17 RX ORDER — ALBUMIN (HUMAN) 12.5 G/50ML
12.5 SOLUTION INTRAVENOUS DAILY PRN
Status: DISCONTINUED | OUTPATIENT
Start: 2023-05-17 | End: 2023-05-19 | Stop reason: HOSPADM

## 2023-05-17 RX ORDER — LIDOCAINE HYDROCHLORIDE AND EPINEPHRINE 10; 10 MG/ML; UG/ML
10 INJECTION, SOLUTION INFILTRATION; PERINEURAL AS NEEDED
Status: DISCONTINUED | OUTPATIENT
Start: 2023-05-17 | End: 2023-05-19 | Stop reason: HOSPADM

## 2023-05-17 RX ADMIN — LIDOCAINE HYDROCHLORIDE 10 ML: 10 INJECTION, SOLUTION EPIDURAL; INFILTRATION; INTRACAUDAL; PERINEURAL at 12:03

## 2023-06-06 DIAGNOSIS — K70.31 ALCOHOLIC CIRRHOSIS OF LIVER WITH ASCITES: Primary | ICD-10-CM

## 2023-06-06 DIAGNOSIS — K70.31 ASCITES DUE TO ALCOHOLIC CIRRHOSIS: ICD-10-CM

## 2023-06-06 RX ORDER — LIDOCAINE HYDROCHLORIDE 10 MG/ML
10 INJECTION, SOLUTION INFILTRATION; PERINEURAL AS NEEDED
OUTPATIENT
Start: 2023-06-06

## 2023-06-06 RX ORDER — ALBUMIN (HUMAN) 12.5 G/50ML
25 SOLUTION INTRAVENOUS DAILY PRN
OUTPATIENT
Start: 2023-06-06

## 2023-06-06 RX ORDER — ALBUMIN (HUMAN) 12.5 G/50ML
12.5 SOLUTION INTRAVENOUS DAILY PRN
OUTPATIENT
Start: 2023-06-06

## 2023-06-08 ENCOUNTER — HOSPITAL ENCOUNTER (OUTPATIENT)
Dept: INFUSION THERAPY | Facility: HOSPITAL | Age: 63
Discharge: HOME | End: 2023-06-08
Admitting: INTERNAL MEDICINE
Payer: MEDICARE

## 2023-06-08 DIAGNOSIS — K70.31 ASCITES DUE TO ALCOHOLIC CIRRHOSIS: ICD-10-CM

## 2023-06-08 DIAGNOSIS — K70.31 ALCOHOLIC CIRRHOSIS OF LIVER WITH ASCITES: ICD-10-CM

## 2023-06-08 PROCEDURE — 76942 ECHO GUIDE FOR BIOPSY: CPT

## 2023-06-08 PROCEDURE — G0463 HOSPITAL OUTPT CLINIC VISIT: HCPCS

## 2023-06-08 PROCEDURE — 76705 ECHO EXAM OF ABDOMEN: CPT

## 2023-06-08 NOTE — NURSING NOTE
Pt here for para.  Pt stated she does not feel any symptoms of feeling full.  U/s showed mininal fluid.  Pt stated she would like to wait another week.  Appt set up for pt.  Pt to call if she needs anything.  Pt verbalized understanding.

## 2023-08-24 ENCOUNTER — OFFICE (OUTPATIENT)
Dept: URBAN - METROPOLITAN AREA CLINIC 64 | Facility: CLINIC | Age: 63
End: 2023-08-24

## 2023-08-24 VITALS
HEIGHT: 63 IN | SYSTOLIC BLOOD PRESSURE: 103 MMHG | WEIGHT: 93 LBS | HEART RATE: 84 BPM | DIASTOLIC BLOOD PRESSURE: 65 MMHG

## 2023-08-24 DIAGNOSIS — G25.81 RESTLESS LEGS SYNDROME: ICD-10-CM

## 2023-08-24 DIAGNOSIS — K70.11 ALCOHOLIC HEPATITIS WITH ASCITES: ICD-10-CM

## 2023-08-24 PROCEDURE — 99214 OFFICE O/P EST MOD 30 MIN: CPT | Performed by: INTERNAL MEDICINE

## 2023-08-24 RX ORDER — ROPINIROLE 0.25 MG/1
TABLET, FILM COATED ORAL
Qty: 90 | Refills: 0 | Status: COMPLETED
End: 2023-10-16

## 2023-10-17 ENCOUNTER — ON CAMPUS - OUTPATIENT (OUTPATIENT)
Dept: URBAN - METROPOLITAN AREA HOSPITAL 2 | Facility: HOSPITAL | Age: 63
End: 2023-10-17
Payer: MEDICARE

## 2023-10-17 VITALS
SYSTOLIC BLOOD PRESSURE: 100 MMHG | HEART RATE: 80 BPM | RESPIRATION RATE: 17 BRPM | HEIGHT: 63 IN | DIASTOLIC BLOOD PRESSURE: 69 MMHG | SYSTOLIC BLOOD PRESSURE: 120 MMHG | OXYGEN SATURATION: 100 % | RESPIRATION RATE: 16 BRPM | HEART RATE: 76 BPM | SYSTOLIC BLOOD PRESSURE: 104 MMHG | DIASTOLIC BLOOD PRESSURE: 74 MMHG | SYSTOLIC BLOOD PRESSURE: 107 MMHG | OXYGEN SATURATION: 99 % | DIASTOLIC BLOOD PRESSURE: 61 MMHG | DIASTOLIC BLOOD PRESSURE: 62 MMHG | TEMPERATURE: 97.3 F | HEART RATE: 84 BPM | SYSTOLIC BLOOD PRESSURE: 124 MMHG | HEART RATE: 78 BPM | DIASTOLIC BLOOD PRESSURE: 58 MMHG | WEIGHT: 100 LBS | OXYGEN SATURATION: 98 % | DIASTOLIC BLOOD PRESSURE: 73 MMHG | SYSTOLIC BLOOD PRESSURE: 103 MMHG

## 2023-10-17 DIAGNOSIS — I85.01 ESOPHAGEAL VARICES WITH BLEEDING: ICD-10-CM

## 2023-10-17 DIAGNOSIS — K70.30 ALCOHOLIC CIRRHOSIS OF LIVER WITHOUT ASCITES: ICD-10-CM

## 2023-10-17 DIAGNOSIS — K31.89 OTHER DISEASES OF STOMACH AND DUODENUM: ICD-10-CM

## 2023-10-17 PROBLEM — K92.2 GASTROINTESTINAL HEMORRHAGE, UNSPECIFIED: Status: ACTIVE | Noted: 2023-10-17

## 2023-10-17 PROCEDURE — 43235 EGD DIAGNOSTIC BRUSH WASH: CPT | Performed by: INTERNAL MEDICINE

## 2024-04-03 ENCOUNTER — HOSPITAL ENCOUNTER (INPATIENT)
Facility: HOSPITAL | Age: 64
LOS: 7 days | Discharge: HOSPICE | End: 2024-04-10
Attending: STUDENT IN AN ORGANIZED HEALTH CARE EDUCATION/TRAINING PROGRAM
Payer: MEDICARE

## 2024-04-03 ENCOUNTER — APPOINTMENT (OUTPATIENT)
Dept: GENERAL RADIOLOGY | Facility: HOSPITAL | Age: 64
End: 2024-04-03
Payer: MEDICARE

## 2024-04-03 ENCOUNTER — INPATIENT HOSPITAL (OUTPATIENT)
Dept: URBAN - METROPOLITAN AREA HOSPITAL 84 | Facility: HOSPITAL | Age: 64
End: 2024-04-03
Payer: MEDICARE

## 2024-04-03 ENCOUNTER — APPOINTMENT (OUTPATIENT)
Dept: CT IMAGING | Facility: HOSPITAL | Age: 64
End: 2024-04-03
Payer: MEDICARE

## 2024-04-03 DIAGNOSIS — D69.6 THROMBOCYTOPENIA: ICD-10-CM

## 2024-04-03 DIAGNOSIS — D53.9 NUTRITIONAL ANEMIA, UNSPECIFIED: ICD-10-CM

## 2024-04-03 DIAGNOSIS — G35 MULTIPLE SCLEROSIS: ICD-10-CM

## 2024-04-03 DIAGNOSIS — D64.9 ANEMIA, UNSPECIFIED TYPE: ICD-10-CM

## 2024-04-03 DIAGNOSIS — E72.20 HYPERAMMONEMIA: ICD-10-CM

## 2024-04-03 DIAGNOSIS — I85.00 ESOPHAGEAL VARICES WITHOUT BLEEDING: ICD-10-CM

## 2024-04-03 DIAGNOSIS — F10.10 ALCOHOL ABUSE, UNCOMPLICATED: ICD-10-CM

## 2024-04-03 DIAGNOSIS — K76.82 HEPATIC ENCEPHALOPATHY: ICD-10-CM

## 2024-04-03 DIAGNOSIS — N39.0 URINARY TRACT INFECTION WITHOUT HEMATURIA, SITE UNSPECIFIED: ICD-10-CM

## 2024-04-03 DIAGNOSIS — K70.31 ALCOHOLIC CIRRHOSIS OF LIVER WITH ASCITES: ICD-10-CM

## 2024-04-03 DIAGNOSIS — R41.82 ALTERED MENTAL STATUS, UNSPECIFIED: ICD-10-CM

## 2024-04-03 DIAGNOSIS — R41.82 ALTERED MENTAL STATUS, UNSPECIFIED ALTERED MENTAL STATUS TYPE: Primary | ICD-10-CM

## 2024-04-03 DIAGNOSIS — D72.829 ELEVATED WHITE BLOOD CELL COUNT, UNSPECIFIED: ICD-10-CM

## 2024-04-03 DIAGNOSIS — A41.9 SEPSIS WITHOUT ACUTE ORGAN DYSFUNCTION, DUE TO UNSPECIFIED ORGANISM: ICD-10-CM

## 2024-04-03 LAB
ABO GROUP BLD: NORMAL
ALBUMIN SERPL-MCNC: 3.3 G/DL (ref 3.5–5.2)
ALBUMIN/GLOB SERPL: 0.8 G/DL
ALP SERPL-CCNC: 167 U/L (ref 39–117)
ALPHA-FETOPROTEIN: <2 NG/ML (ref 0–8.3)
ALT SERPL W P-5'-P-CCNC: 31 U/L (ref 1–33)
AMMONIA BLD-SCNC: 131 UMOL/L (ref 11–51)
AMPHET+METHAMPHET UR QL: NEGATIVE
ANION GAP SERPL CALCULATED.3IONS-SCNC: 17 MMOL/L (ref 5–15)
AST SERPL-CCNC: 79 U/L (ref 1–32)
B PARAPERT DNA SPEC QL NAA+PROBE: NOT DETECTED
B PERT DNA SPEC QL NAA+PROBE: NOT DETECTED
BACTERIA UR QL AUTO: ABNORMAL /HPF
BARBITURATES UR QL SCN: NEGATIVE
BENZODIAZ UR QL SCN: NEGATIVE
BILIRUB SERPL-MCNC: 10.8 MG/DL (ref 0–1.2)
BILIRUB UR QL STRIP: ABNORMAL
BLD GP AB SCN SERPL QL: NEGATIVE
BUN SERPL-MCNC: 36 MG/DL (ref 8–23)
BUN/CREAT SERPL: 32.1 (ref 7–25)
C PNEUM DNA NPH QL NAA+NON-PROBE: NOT DETECTED
CALCIUM SPEC-SCNC: 10.1 MG/DL (ref 8.6–10.5)
CANNABINOIDS SERPL QL: NEGATIVE
CHLORIDE SERPL-SCNC: 108 MMOL/L (ref 98–107)
CK SERPL-CCNC: 25 U/L (ref 20–180)
CLARITY UR: ABNORMAL
CO2 SERPL-SCNC: 16 MMOL/L (ref 22–29)
COCAINE UR QL: NEGATIVE
COLOR UR: ABNORMAL
CREAT SERPL-MCNC: 1.12 MG/DL (ref 0.57–1)
D-LACTATE SERPL-SCNC: 3 MMOL/L (ref 0.3–2)
D-LACTATE SERPL-SCNC: 3.1 MMOL/L (ref 0.5–2)
D-LACTATE SERPL-SCNC: 3.4 MMOL/L (ref 0.5–2)
DEPRECATED RDW RBC AUTO: 87.9 FL (ref 37–54)
EGFRCR SERPLBLD CKD-EPI 2021: 55.7 ML/MIN/1.73
ERYTHROCYTE [DISTWIDTH] IN BLOOD BY AUTOMATED COUNT: 21.6 % (ref 12.3–15.4)
ETHANOL UR QL: <0.01 %
FLUAV SUBTYP SPEC NAA+PROBE: NOT DETECTED
FLUBV RNA ISLT QL NAA+PROBE: NOT DETECTED
GEN 5 2HR TROPONIN T REFLEX: 18 NG/L
GLOBULIN UR ELPH-MCNC: 3.9 GM/DL
GLUCOSE BLDC GLUCOMTR-MCNC: 101 MG/DL (ref 70–105)
GLUCOSE SERPL-MCNC: 119 MG/DL (ref 65–99)
GLUCOSE UR STRIP-MCNC: NEGATIVE MG/DL
HADV DNA SPEC NAA+PROBE: NOT DETECTED
HCOV 229E RNA SPEC QL NAA+PROBE: NOT DETECTED
HCOV HKU1 RNA SPEC QL NAA+PROBE: NOT DETECTED
HCOV NL63 RNA SPEC QL NAA+PROBE: NOT DETECTED
HCOV OC43 RNA SPEC QL NAA+PROBE: NOT DETECTED
HCT VFR BLD AUTO: 23.6 % (ref 34–46.6)
HGB BLD-MCNC: 7.1 G/DL (ref 12–15.9)
HGB UR QL STRIP.AUTO: NEGATIVE
HMPV RNA NPH QL NAA+NON-PROBE: NOT DETECTED
HOLD SPECIMEN: NORMAL
HPIV1 RNA ISLT QL NAA+PROBE: NOT DETECTED
HPIV2 RNA SPEC QL NAA+PROBE: NOT DETECTED
HPIV3 RNA NPH QL NAA+PROBE: NOT DETECTED
HPIV4 P GENE NPH QL NAA+PROBE: NOT DETECTED
HYALINE CASTS UR QL AUTO: ABNORMAL /LPF
INR PPP: 1.68 (ref 0.93–1.1)
KETONES UR QL STRIP: ABNORMAL
LEUKOCYTE ESTERASE UR QL STRIP.AUTO: ABNORMAL
LIPASE SERPL-CCNC: 149 U/L (ref 13–60)
LYMPHOCYTES # BLD MANUAL: 1.09 10*3/MM3 (ref 0.7–3.1)
LYMPHOCYTES NFR BLD MANUAL: 7 % (ref 5–12)
M PNEUMO IGG SER IA-ACNC: NOT DETECTED
MACROCYTES BLD QL SMEAR: ABNORMAL
MAGNESIUM SERPL-MCNC: 2.1 MG/DL (ref 1.6–2.4)
MCH RBC QN AUTO: 34.1 PG (ref 26.6–33)
MCHC RBC AUTO-ENTMCNC: 30.1 G/DL (ref 31.5–35.7)
MCV RBC AUTO: 113.5 FL (ref 79–97)
METHADONE UR QL SCN: NEGATIVE
MONOCYTES # BLD: 1.09 10*3/MM3 (ref 0.1–0.9)
NEUTROPHILS # BLD AUTO: 13.35 10*3/MM3 (ref 1.7–7)
NEUTROPHILS NFR BLD MANUAL: 84 % (ref 42.7–76)
NEUTS BAND NFR BLD MANUAL: 2 % (ref 0–5)
NITRITE UR QL STRIP: POSITIVE
OPIATES UR QL: NEGATIVE
OVALOCYTES BLD QL SMEAR: ABNORMAL
OXYCODONE UR QL SCN: NEGATIVE
PH UR STRIP.AUTO: 5.5 [PH] (ref 5–8)
PLAT MORPH BLD: NORMAL
PLATELET # BLD AUTO: 85 10*3/MM3 (ref 140–450)
PMV BLD AUTO: 10.5 FL (ref 6–12)
POLYCHROMASIA BLD QL SMEAR: ABNORMAL
POTASSIUM SERPL-SCNC: 4.8 MMOL/L (ref 3.5–5.2)
PROT SERPL-MCNC: 7.2 G/DL (ref 6–8.5)
PROT UR QL STRIP: NEGATIVE
PROTHROMBIN TIME: 17.6 SECONDS (ref 9.6–11.7)
RBC # BLD AUTO: 2.08 10*6/MM3 (ref 3.77–5.28)
RBC # UR STRIP: ABNORMAL /HPF
REF LAB TEST METHOD: ABNORMAL
RH BLD: POSITIVE
RHINOVIRUS RNA SPEC NAA+PROBE: NOT DETECTED
RSV RNA NPH QL NAA+NON-PROBE: NOT DETECTED
SARS-COV-2 RNA NPH QL NAA+NON-PROBE: NOT DETECTED
SCAN SLIDE: NORMAL
SODIUM SERPL-SCNC: 141 MMOL/L (ref 136–145)
SP GR UR STRIP: 1.03 (ref 1–1.03)
SQUAMOUS #/AREA URNS HPF: ABNORMAL /HPF
T&S EXPIRATION DATE: NORMAL
TOXIC GRANULATION: ABNORMAL
TROPONIN T DELTA: -1 NG/L
TROPONIN T SERPL HS-MCNC: 19 NG/L
UROBILINOGEN UR QL STRIP: ABNORMAL
VARIANT LYMPHS NFR BLD MANUAL: 7 % (ref 19.6–45.3)
WBC # UR STRIP: ABNORMAL /HPF
WBC NRBC COR # BLD AUTO: 15.52 10*3/MM3 (ref 3.4–10.8)

## 2024-04-03 PROCEDURE — 25810000003 SODIUM CHLORIDE 0.9 % SOLUTION: Performed by: NURSE PRACTITIONER

## 2024-04-03 PROCEDURE — 70450 CT HEAD/BRAIN W/O DYE: CPT

## 2024-04-03 PROCEDURE — 25010000002 PIPERACILLIN SOD-TAZOBACTAM PER 1 G: Performed by: NURSE PRACTITIONER

## 2024-04-03 PROCEDURE — 82105 ALPHA-FETOPROTEIN SERUM: CPT | Performed by: NURSE PRACTITIONER

## 2024-04-03 PROCEDURE — 82948 REAGENT STRIP/BLOOD GLUCOSE: CPT

## 2024-04-03 PROCEDURE — 83735 ASSAY OF MAGNESIUM: CPT | Performed by: NURSE PRACTITIONER

## 2024-04-03 PROCEDURE — 86850 RBC ANTIBODY SCREEN: CPT | Performed by: NURSE PRACTITIONER

## 2024-04-03 PROCEDURE — 87077 CULTURE AEROBIC IDENTIFY: CPT | Performed by: NURSE PRACTITIONER

## 2024-04-03 PROCEDURE — 86901 BLOOD TYPING SEROLOGIC RH(D): CPT

## 2024-04-03 PROCEDURE — 80307 DRUG TEST PRSMV CHEM ANLYZR: CPT | Performed by: NURSE PRACTITIONER

## 2024-04-03 PROCEDURE — 80053 COMPREHEN METABOLIC PANEL: CPT | Performed by: NURSE PRACTITIONER

## 2024-04-03 PROCEDURE — 82550 ASSAY OF CK (CPK): CPT | Performed by: NURSE PRACTITIONER

## 2024-04-03 PROCEDURE — 99285 EMERGENCY DEPT VISIT HI MDM: CPT

## 2024-04-03 PROCEDURE — 84484 ASSAY OF TROPONIN QUANT: CPT | Performed by: NURSE PRACTITIONER

## 2024-04-03 PROCEDURE — P9612 CATHETERIZE FOR URINE SPEC: HCPCS

## 2024-04-03 PROCEDURE — 82140 ASSAY OF AMMONIA: CPT | Performed by: NURSE PRACTITIONER

## 2024-04-03 PROCEDURE — 25010000002 THIAMINE HCL 200 MG/2ML SOLUTION 2 ML VIAL: Performed by: STUDENT IN AN ORGANIZED HEALTH CARE EDUCATION/TRAINING PROGRAM

## 2024-04-03 PROCEDURE — 25510000001 IOPAMIDOL PER 1 ML: Performed by: EMERGENCY MEDICINE

## 2024-04-03 PROCEDURE — 71045 X-RAY EXAM CHEST 1 VIEW: CPT

## 2024-04-03 PROCEDURE — 63710000001 PREDNISOLONE PER 5 MG: Performed by: NURSE PRACTITIONER

## 2024-04-03 PROCEDURE — 83605 ASSAY OF LACTIC ACID: CPT | Performed by: NURSE PRACTITIONER

## 2024-04-03 PROCEDURE — 86900 BLOOD TYPING SEROLOGIC ABO: CPT

## 2024-04-03 PROCEDURE — 85007 BL SMEAR W/DIFF WBC COUNT: CPT | Performed by: NURSE PRACTITIONER

## 2024-04-03 PROCEDURE — 87186 SC STD MICRODIL/AGAR DIL: CPT | Performed by: NURSE PRACTITIONER

## 2024-04-03 PROCEDURE — 82607 VITAMIN B-12: CPT | Performed by: INTERNAL MEDICINE

## 2024-04-03 PROCEDURE — 25810000003 LACTATED RINGERS PER 1000 ML: Performed by: STUDENT IN AN ORGANIZED HEALTH CARE EDUCATION/TRAINING PROGRAM

## 2024-04-03 PROCEDURE — 87086 URINE CULTURE/COLONY COUNT: CPT | Performed by: NURSE PRACTITIONER

## 2024-04-03 PROCEDURE — 99222 1ST HOSP IP/OBS MODERATE 55: CPT | Performed by: NURSE PRACTITIONER

## 2024-04-03 PROCEDURE — 0202U NFCT DS 22 TRGT SARS-COV-2: CPT | Performed by: NURSE PRACTITIONER

## 2024-04-03 PROCEDURE — 86923 COMPATIBILITY TEST ELECTRIC: CPT

## 2024-04-03 PROCEDURE — 82077 ASSAY SPEC XCP UR&BREATH IA: CPT | Performed by: NURSE PRACTITIONER

## 2024-04-03 PROCEDURE — 81001 URINALYSIS AUTO W/SCOPE: CPT | Performed by: NURSE PRACTITIONER

## 2024-04-03 PROCEDURE — 83690 ASSAY OF LIPASE: CPT | Performed by: NURSE PRACTITIONER

## 2024-04-03 PROCEDURE — 36415 COLL VENOUS BLD VENIPUNCTURE: CPT

## 2024-04-03 PROCEDURE — 85025 COMPLETE CBC W/AUTO DIFF WBC: CPT | Performed by: NURSE PRACTITIONER

## 2024-04-03 PROCEDURE — 25010000002 ONDANSETRON PER 1 MG: Performed by: STUDENT IN AN ORGANIZED HEALTH CARE EDUCATION/TRAINING PROGRAM

## 2024-04-03 PROCEDURE — 85610 PROTHROMBIN TIME: CPT | Performed by: NURSE PRACTITIONER

## 2024-04-03 PROCEDURE — 93005 ELECTROCARDIOGRAM TRACING: CPT | Performed by: NURSE PRACTITIONER

## 2024-04-03 PROCEDURE — 87040 BLOOD CULTURE FOR BACTERIA: CPT | Performed by: NURSE PRACTITIONER

## 2024-04-03 PROCEDURE — 74177 CT ABD & PELVIS W/CONTRAST: CPT

## 2024-04-03 PROCEDURE — 86900 BLOOD TYPING SEROLOGIC ABO: CPT | Performed by: NURSE PRACTITIONER

## 2024-04-03 PROCEDURE — 86901 BLOOD TYPING SEROLOGIC RH(D): CPT | Performed by: NURSE PRACTITIONER

## 2024-04-03 RX ORDER — SODIUM CHLORIDE, SODIUM LACTATE, POTASSIUM CHLORIDE, CALCIUM CHLORIDE 600; 310; 30; 20 MG/100ML; MG/100ML; MG/100ML; MG/100ML
100 INJECTION, SOLUTION INTRAVENOUS CONTINUOUS
Status: DISCONTINUED | OUTPATIENT
Start: 2024-04-03 | End: 2024-04-10 | Stop reason: HOSPADM

## 2024-04-03 RX ORDER — SODIUM CHLORIDE 9 MG/ML
40 INJECTION, SOLUTION INTRAVENOUS AS NEEDED
Status: DISCONTINUED | OUTPATIENT
Start: 2024-04-03 | End: 2024-04-10 | Stop reason: HOSPADM

## 2024-04-03 RX ORDER — LACTULOSE 10 G/15ML
20 SOLUTION ORAL 2 TIMES DAILY
Status: DISCONTINUED | OUTPATIENT
Start: 2024-04-03 | End: 2024-04-04

## 2024-04-03 RX ORDER — PANTOPRAZOLE SODIUM 40 MG/10ML
80 INJECTION, POWDER, LYOPHILIZED, FOR SOLUTION INTRAVENOUS ONCE
Status: COMPLETED | OUTPATIENT
Start: 2024-04-03 | End: 2024-04-03

## 2024-04-03 RX ORDER — DIPHENOXYLATE HYDROCHLORIDE AND ATROPINE SULFATE 2.5; .025 MG/1; MG/1
1 TABLET ORAL DAILY
Status: DISCONTINUED | OUTPATIENT
Start: 2024-04-03 | End: 2024-04-08

## 2024-04-03 RX ORDER — ONDANSETRON 2 MG/ML
4 INJECTION INTRAMUSCULAR; INTRAVENOUS EVERY 6 HOURS PRN
Status: DISCONTINUED | OUTPATIENT
Start: 2024-04-03 | End: 2024-04-10 | Stop reason: HOSPADM

## 2024-04-03 RX ORDER — LORAZEPAM 1 MG/1
2 TABLET ORAL EVERY 6 HOURS
Status: DISCONTINUED | OUTPATIENT
Start: 2024-04-03 | End: 2024-04-03

## 2024-04-03 RX ORDER — MIDAZOLAM HYDROCHLORIDE 1 MG/ML
4 INJECTION INTRAMUSCULAR; INTRAVENOUS
Status: ACTIVE | OUTPATIENT
Start: 2024-04-03 | End: 2024-04-08

## 2024-04-03 RX ORDER — PREDNISOLONE SODIUM PHOSPHATE 15 MG/5ML
40 SOLUTION ORAL DAILY
Status: DISCONTINUED | OUTPATIENT
Start: 2024-04-03 | End: 2024-04-08

## 2024-04-03 RX ORDER — SODIUM CHLORIDE 0.9 % (FLUSH) 0.9 %
10 SYRINGE (ML) INJECTION AS NEEDED
Status: DISCONTINUED | OUTPATIENT
Start: 2024-04-03 | End: 2024-04-10 | Stop reason: HOSPADM

## 2024-04-03 RX ORDER — PANTOPRAZOLE SODIUM 40 MG/10ML
40 INJECTION, POWDER, LYOPHILIZED, FOR SOLUTION INTRAVENOUS 2 TIMES DAILY
Status: DISCONTINUED | OUTPATIENT
Start: 2024-04-03 | End: 2024-04-05

## 2024-04-03 RX ORDER — FOLIC ACID 1 MG/1
1 TABLET ORAL DAILY
Status: DISCONTINUED | OUTPATIENT
Start: 2024-04-03 | End: 2024-04-03

## 2024-04-03 RX ORDER — MIDAZOLAM HYDROCHLORIDE 1 MG/ML
2 INJECTION INTRAMUSCULAR; INTRAVENOUS
Status: DISPENSED | OUTPATIENT
Start: 2024-04-03 | End: 2024-04-08

## 2024-04-03 RX ORDER — SODIUM CHLORIDE 9 MG/ML
75 INJECTION, SOLUTION INTRAVENOUS CONTINUOUS
Status: DISCONTINUED | OUTPATIENT
Start: 2024-04-03 | End: 2024-04-03

## 2024-04-03 RX ORDER — ONDANSETRON 4 MG/1
4 TABLET, ORALLY DISINTEGRATING ORAL EVERY 6 HOURS PRN
Status: DISCONTINUED | OUTPATIENT
Start: 2024-04-03 | End: 2024-04-10 | Stop reason: HOSPADM

## 2024-04-03 RX ORDER — LORAZEPAM 1 MG/1
1 TABLET ORAL
Status: ACTIVE | OUTPATIENT
Start: 2024-04-03 | End: 2024-04-08

## 2024-04-03 RX ORDER — ZINC SULFATE 50(220)MG
220 CAPSULE ORAL DAILY
Status: DISCONTINUED | OUTPATIENT
Start: 2024-04-03 | End: 2024-04-08

## 2024-04-03 RX ORDER — THIAMINE HYDROCHLORIDE 100 MG/ML
200 INJECTION, SOLUTION INTRAMUSCULAR; INTRAVENOUS EVERY 8 HOURS SCHEDULED
Status: DISCONTINUED | OUTPATIENT
Start: 2024-04-06 | End: 2024-04-08

## 2024-04-03 RX ORDER — SODIUM CHLORIDE 0.9 % (FLUSH) 0.9 %
10 SYRINGE (ML) INJECTION EVERY 12 HOURS SCHEDULED
Status: DISCONTINUED | OUTPATIENT
Start: 2024-04-03 | End: 2024-04-10 | Stop reason: HOSPADM

## 2024-04-03 RX ORDER — LACTULOSE 10 G/15ML
30 SOLUTION ORAL ONCE
Status: COMPLETED | OUTPATIENT
Start: 2024-04-03 | End: 2024-04-03

## 2024-04-03 RX ORDER — LORAZEPAM 1 MG/1
1 TABLET ORAL EVERY 6 HOURS
Status: DISCONTINUED | OUTPATIENT
Start: 2024-04-04 | End: 2024-04-03

## 2024-04-03 RX ORDER — MIDAZOLAM HYDROCHLORIDE 1 MG/ML
4 INJECTION INTRAMUSCULAR; INTRAVENOUS
Status: DISPENSED | OUTPATIENT
Start: 2024-04-03 | End: 2024-04-08

## 2024-04-03 RX ORDER — LORAZEPAM 1 MG/1
2 TABLET ORAL
Status: DISPENSED | OUTPATIENT
Start: 2024-04-03 | End: 2024-04-08

## 2024-04-03 RX ADMIN — THIAMINE HYDROCHLORIDE 500 MG: 100 INJECTION, SOLUTION INTRAMUSCULAR; INTRAVENOUS at 21:32

## 2024-04-03 RX ADMIN — THERA TABS 1 TABLET: TAB at 21:03

## 2024-04-03 RX ADMIN — IOPAMIDOL 100 ML: 755 INJECTION, SOLUTION INTRAVENOUS at 15:24

## 2024-04-03 RX ADMIN — PIPERACILLIN AND TAZOBACTAM 3.38 G: 3; .375 INJECTION, POWDER, FOR SOLUTION INTRAVENOUS at 15:40

## 2024-04-03 RX ADMIN — RIFAXIMIN 550 MG: 550 TABLET ORAL at 21:03

## 2024-04-03 RX ADMIN — Medication 10 ML: at 21:04

## 2024-04-03 RX ADMIN — Medication 220 MG: at 21:03

## 2024-04-03 RX ADMIN — LACTULOSE 20 G: 20 SOLUTION ORAL at 21:03

## 2024-04-03 RX ADMIN — SODIUM CHLORIDE, POTASSIUM CHLORIDE, SODIUM LACTATE AND CALCIUM CHLORIDE 100 ML/HR: 600; 310; 30; 20 INJECTION, SOLUTION INTRAVENOUS at 21:04

## 2024-04-03 RX ADMIN — PANTOPRAZOLE SODIUM 40 MG: 40 INJECTION, POWDER, FOR SOLUTION INTRAVENOUS at 21:03

## 2024-04-03 RX ADMIN — FOLIC ACID 1 MG: 5 INJECTION, SOLUTION INTRAMUSCULAR; INTRAVENOUS; SUBCUTANEOUS at 21:04

## 2024-04-03 RX ADMIN — SODIUM CHLORIDE 1000 ML: 9 INJECTION, SOLUTION INTRAVENOUS at 14:10

## 2024-04-03 RX ADMIN — PANTOPRAZOLE SODIUM 80 MG: 40 INJECTION, POWDER, FOR SOLUTION INTRAVENOUS at 15:39

## 2024-04-03 RX ADMIN — ONDANSETRON 4 MG: 2 INJECTION INTRAMUSCULAR; INTRAVENOUS at 22:24

## 2024-04-03 RX ADMIN — LACTULOSE 30 G: 20 SOLUTION ORAL at 15:39

## 2024-04-03 RX ADMIN — PREDNISOLONE SODIUM PHOSPHATE 40 MG: 15 SOLUTION ORAL at 21:03

## 2024-04-03 NOTE — ED NOTES
Complete bed change and brief change done by this nurse. Pt wounds on perineal area documented in the chart along with images. Pt denies any further needs at this time
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
## 2024-04-03 NOTE — ED NOTES
Anshul Co  ([place]) and  speaking with pt daughter Vera at this time. Case number: [id number]

## 2024-04-03 NOTE — LETTER
EMS Transport Request  For use at [facility], [place], [place], [place], and [place] only   Patient Name: Nelda Alcocer : 1960   Weight:52.4 kg (115 lb 8.3 oz) Pick-up Location: 2123 BLS/ALS: BLS/ALS: BLS   Insurance: [employer] MED[employer] REPLACEMENT Auth End Date: N/A   Pre-Cert #:N/A D/C Summary complete: Yes   Destination: Other Daughter's home:  [address] [place] [address]. No steps.   Contact Precautions: None   Equipment (O2, Fluids, etc.): O2, settings 15L   Arrive By Date/Time: 4/10/24 1700 Stretcher/WC: Stretcher   CM Requesting: Mindy Paez RN     Office Phone: [phone]  Office Cell: [phone]   Ext: [phone]   Notes/Medical Necessity: hepatic encephalopathy with altered mental status, home with [facility], 15L O2 unable to self administer.     ______________________________________________________________________    *Only 2 patient bags OR 1 carry-on size bag are permitted.  Wheelchairs and walkers CANNOT transported with the patient. Acknowledge: Yes

## 2024-04-03 NOTE — ED NOTES
Pt arrived from home under the care of her . Pt had dried feces and urine all over her body and clothing. Pt disoriented x4. Pt noncompliant with following directions.Pt difficult to arouse.

## 2024-04-03 NOTE — ED NOTES
" states she would like to wait to speak with patient regarding statements provided by patient's daughter Vera. Pt alert and oriented to self only at this time. Pt able to answer \"yes\" and \"no\" questions. When this nurse asked if she wanted to go back home upon time of d/c pt stated \"no.\"  "

## 2024-04-03 NOTE — Clinical Note
Level of Care: Med/Surg [1]   Diagnosis: Hepatic encephalopathy [572.2.ICD-9-CM]   Admitting Physician: TONJA HE [[id number]]   Attending Physician: TONJA HE [[id number]]   Certification: I Certify That Inpatient Hospital Services Are Medically Necessary For Greater Than 2 Midnights

## 2024-04-03 NOTE — ED NOTES
[facility] contacted, no reference number given. Patient is oriented to self. Disoriented to place, time and situation

## 2024-04-03 NOTE — ED NOTES
"Pt daughter Vera at pt bedside. Pt daughter states pt \"drinks enough alcohol to be intoxicated all day, every day.\"  "

## 2024-04-03 NOTE — CONSULTS
GI CONSULT  NOTE:    Referring Provider:  Dr. Ha    Chief complaint: Cirrhosis    Subjective .     History of present illness: Patient is a 64-year-old female with alcohol cirrhosis that has been complicated by ascites requiring paracentesis and nonbleeding esophageal varices.  She also has history of COPD, MS, and DVT.  She was admitted to the hospital today with decreasing mental status and increasing weakness at home.  Her daughter is at the bedside to assist with history.  Patient continues to drink alcohol heavily daily.  Last drink was a day and a half ago.  She is not eating much at home.  Patient denies having any abdominal pain.      Endo History:  10/2023 EGD by Dr. Massey -1 quart of grade 1 esophageal varices, portal hypertensive gastropathy.    Past Medical History:  Past Medical History:   Diagnosis Date    Ascites     COPD (chronic obstructive pulmonary disease)     H/O blood clots     left leg    Liver disease     Multiple sclerosis     Vertigo        Past Surgical History:  Past Surgical History:   Procedure Laterality Date    HAND SURGERY Right     HYSTERECTOMY         Social History:  Social History     Tobacco Use    Smoking status: Every Day     Current packs/day: 1.50     Average packs/day: 1.5 packs/day for 35.0 years (52.5 ttl pk-yrs)     Types: Cigarettes    Smokeless tobacco: Never   Substance Use Topics    Alcohol use: Yes     Alcohol/week: 77.0 standard drinks of alcohol     Types: 77 Shots of liquor per week     Comment: Vodka and Fireball    Drug use: Never       Family History:  Family History   Problem Relation Age of Onset    Peripheral vascular disease Mother        Medications:  (Not in a hospital admission)      Scheduled Meds:   Continuous Infusions:   PRN Meds:.  [COMPLETED] Insert Peripheral IV **AND** sodium chloride    ALLERGIES:  Patient has no known allergies.    ROS:  Review of Systems   Respiratory:  Negative for cough.    Cardiovascular:  Negative for chest pain.  "  Gastrointestinal:  Negative for abdominal distention, abdominal pain, nausea and vomiting.   Neurological:  Positive for weakness. Negative for dizziness.   Psychiatric/Behavioral:  Positive for confusion and decreased concentration.        Objective     Vital Signs:   Visit Vitals  /62   Pulse 115   Temp 98.1 °F (36.7 °C) (Oral)   Resp 19   Ht 160 cm (63\")   Wt 50.3 kg (111 lb)   SpO2 100%   BMI 19.66 kg/m²       Physical Exam:      General Appearance:    Awake and alert but confused, ill-appearing and shaky   Head:    Normocephalic, without obvious abnormality, atraumatic   Eyes:            Conjunctivae normal, icteric sclera   Ears:    Ears appear intact with no abnormalities noted   Throat:   No oral lesions, no thrush, oral mucosa moist       Lungs:     Respirations regular, even and unlabored       Chest Wall:    No abnormalities observed   Abdomen:     Soft, non-tender, no rebound or guarding, non-distended   Rectal:     Deferred   Extremities:   No edema, no cyanosis, no redness   Pulses:   Pulses palpable and equal bilaterally   Skin:   No bleeding, bruising or rash, + jaundice   Lymph nodes:   No palpable adenopathy   Neurologic:   Sensation intact       Results Review:   I reviewed the patient's labs and imaging.  CBC  Results from last 7 days   Lab Units 04/03/24  1359   RBC 10*6/mm3 2.08*   WBC 10*3/mm3 15.52*   HEMOGLOBIN g/dL 7.1*   PLATELETS 10*3/mm3 85*       CMP  Results from last 7 days   Lab Units 04/03/24  1405 04/03/24  1359   SODIUM mmol/L  --  141   POTASSIUM mmol/L  --  4.8   CHLORIDE mmol/L  --  108*   CO2 mmol/L  --  16.0*   BUN mg/dL  --  36*   CREATININE mg/dL  --  1.12*   GLUCOSE mg/dL  --  119*   ALBUMIN g/dL  --  3.3*   BILIRUBIN mg/dL  --  10.8*   ALK PHOS U/L  --  167*   AST (SGOT) U/L  --  79*   ALT (SGPT) U/L  --  31   AMMONIA umol/L 131*  --        Amylase and Lipase  Results from last 7 days   Lab Units 04/03/24  1359   LIPASE U/L 149*       CRP         Imaging Results " (Last 24 Hours)       Procedure Component Value Units Date/Time    CT Abdomen Pelvis With Contrast [[id number]] Collected: 04/03/24 1529     Updated: 04/03/24 1552    Narrative:      CT ABDOMEN PELVIS W CONTRAST    Date of Exam: 4/3/2024 3:12 PM EDT    Indication: elevated lipase/elevated LFTs.    Comparison: CT abdomen pelvis dated 3/14/2023    Technique: Axial CT images were obtained of the abdomen and pelvis following the uneventful intravenous administration of iodinated contrast. Sagittal and coronal reconstructions were performed.  Automated exposure control and iterative reconstruction   methods were used.        Findings:  There is respiratory artifact limiting the exam. The lung bases are clear.    The liver shows mild diffuse nodular surface. There is interval development of diffuse patchy heterogeneous appearance of the liver parenchyma with ill distinct hypodense areas which could represent diffuse hepatocellular disease or multicentric liver   lesions not conclusive. The liver is enlarged. There are small calcified gallstones. There is mild amount of ascites. The spleen shows homogeneous density. The pancreas shows homogeneous density with no definite peripancreatic fat stranding. The   underlying respiratory fact limits evaluation for subtle changes. There are few borderline sized periportal and retroperitoneal lymph nodes. Few of the mildly enlarged measuring up to 1.2 cm. The aorta shows a normal diameter with atherosclerosis.    The kidneys show a normal position with no hydronephrosis or nephrolithiasis. The small and large bowel loops are normal in caliber. The bladder is unremarkable. Hysterectomy changes. There is a left inguinal hernia with omental fat content and fluid.   There is enlargement of the left ovarian vein and multiple enlarged left pelvic varices.      Impression:      Impression:    1. Limited exam due to respiratory fact    2. Mild diffuse nodular liver surface concerning for  cirrhosis. In addition there is interval development of diffuse patchy heterogeneity of the liver parenchyma unclear if represents diffuse hepatocellular disease or multicentric liver lesions. Further   characterization with a liver MRI with and without contrast recommended    3. Mild amount of ascites    4. Stable mild enlarged periportal and retroperitoneal lymph nodes    5. Cholelithiasis    6. No CT evidence of acute pancreatitis    7. Additional ancillary findings as above            Electronically Signed: Carl Chaves MD    4/3/2024 3:50 PM EDT    Workstation ID: [id number]    CT Head Without Contrast [[id number]] Collected: 04/03/24 1526     Updated: 04/03/24 1531    Narrative:      CT HEAD WO CONTRAST    Date of Exam: 4/3/2024 3:12 PM EDT    Indication: AMS.    Comparison: None available.    Technique: Axial CT images were obtained of the head without contrast administration.  Coronal reconstructions were performed.  Automated exposure control and iterative reconstruction methods were used.      Findings: There is respiratory fat limiting the exam. There is diffuse brain atrophy. Periventricular hypodense areas compatible with chronic small vessel ischemia    There is no evidence of hemorrhage. There is no mass effect or midline shift.    There is no extracerebral collection.    Ventricles are normal in size and configuration for patient's stated age.     Posterior fossa is within normal limits.    Calvarium and skull base appear intact.  Visualized sinuses show no air fluid levels. Visualized orbits are unremarkable.      Impression:      Impression:  Brain atrophy with chronic microvascular ischemic changes    No evidence of acute intracranial abnormality    Limited exam due to motion artifact      Electronically Signed: Carl Chaves MD    4/3/2024 3:29 PM EDT    Workstation ID: [id number]    XR Chest 1 View [[id number]] Collected: 04/03/24 1444     Updated: 04/03/24 1448    Narrative:      XR CHEST 1  VW    Date of Exam: 4/3/2024 2:27 PM EDT    Indication: ams    Comparison: 11/30/2022.    Findings:  Heart and pulmonary vessels appear within normal limits. Lung fields are well inflated and clear of acute infiltrates or effusions. There is no pneumothorax.      Impression:      Impression:  No acute process.      Electronically Signed: Clara Byrne MD    4/3/2024 2:46 PM EDT    Workstation ID: [id number]              ASSESSMENT AND PLAN:  64-year-old female with alcohol cirrhosis and continued alcohol use presented 4/3/2024 with decreasing mental status and increasing weakness and inability to care for herself at home.    Alcohol hepatitis/cirrhosis  Cirrhosis previously complicated by ascites and nonbleeding esophageal varices  Hepatic encephalopathy  Macrocytic anemia  Leukocytosis  COPD  MS  History of DVT  Alcohol abuse    Active Problems:    * No active hospital problems. *     Plan:  64-year-old female with alcohol cirrhosis presented to the hospital today with some confusion and increasing weakness.  Her daughter is at the bedside to assist with history.  She drinks alcohol heavily daily and is not eating much.  She has previously been seen in our office but not for months.  Her last hemoglobin was 12 and is now down to 7.1.  No reports of overt GI bleeding.  Ammonia is 131.  Creatinine 1.12.  Liver enzymes have elevated with total bilirubin 10.8, AST 79, alk phos 167.  INR 1.68.  Likely with acute alcohol hepatitis.  CT head with no acute findings and chest x-ray negative.  Urine culture is pending. CT of the abdomen/pelvis with contrast shows cirrhosis with development of diffuse patchy heterogeneity possibly due to multicentric liver lesions.  Also shows only mild amount of ascites and stable periportal and retroperitoneal lymph nodes along with cholelithiasis.  Will check AFP and can plan MRI at some point.  MELD sodium score 22 and Madrey's discriminant function of 36.5.  Will start zinc and  prednisolone along with vitamins.  Continue pantoprazole and lactulose.  Add Xifaxan.  Monitor for alcohol withdrawal.  Monitor H&H and transfuse as needed.  She will likely need EGD and colonoscopy at some point as well.  Continue supportive care and needs complete cessation of alcohol.  Follow labs.    I discussed the patients findings and my recommendations with the patient.  Melanie Rosales, MICHAELLE  04/03/24  16:08 EDT

## 2024-04-03 NOTE — H&P
[facility] Medicine Services  History & Physical    Patient Name: Nelda Alcocer  : 1960  MRN: [id number]  Primary Care Physician:  Darrell, No Known  Date of admission: 4/3/2024  Date and Time of Service: 4/3/2024 at 5:45 pm     Subjective      Chief Complaint: worsening confusion     History of Present Illness: Nelda Alcocer is a 64 y.o. female with a CMH of multiple sclerosis, alcohol use with liver cirrhosis and tobacco abuse DVT lower extremity presented with poor oral intake and progressively worsening confusion.  Patient lives with her  and consents alcohol daily.  Per report patient has not removed from her chair in the last 3 days  was concerned and called EMS    In the ER patient was tachycardic in the range of 125 blood pressure 117/60 and saturating 99% on room air lab was significant for mildly elevated troponin of 19 which subsequently was 18; chloride of 108, bicarb of 16 and anion gap of 17.  Creatinine was elevated 1.12 with elevated BUN/creatinine ratio.  Alkaline phosphatase was 167 and AST was also elevated.  Total bilirubin was 10.8.  Lactic acid was 3 subsequently 3.4.  PT and INR were elevated 17.6 and 1.68 white cell count was 15.2 with neutrophil predominance.  Hemoglobin noted for 7.1 as compared to 12 with anemia  Microcytic features urinalysis was suggestive of infection with nitrate positive and WBC of 6-10 per microscopic field    Chest x-ray showed no acute process; CT of the head without contrast brain atrophy with microvascular ischemic changes and no other significant acute intracranial abnormality.  CT abdomen pelvis contrast showed liver cirrhosis with patchy heterogeneous liver parenchyma suggestive of hepatocellular disease or multicentric liver lesions recommended MRI.  Mild amount of ascites was stable mildly enlarged periportal and retroperitoneal lymph nodes.  Cholelithiasis was also noted      Review of Systems  Limited  exam  Personal History     Past Medical History:   Diagnosis Date    Ascites     COPD (chronic obstructive pulmonary disease)     H/O blood clots     left leg    Liver disease     Multiple sclerosis     Vertigo        Past Surgical History:   Procedure Laterality Date    HAND SURGERY Right     HYSTERECTOMY         Family History: family history includes Peripheral vascular disease in her mother. Otherwise pertinent FHx was reviewed and not pertinent to current issue.    Social History:  reports that she has been smoking cigarettes. She has a 52.5 pack-year smoking history. She has never used smokeless tobacco. She reports current alcohol use of about 77.0 standard drinks of alcohol per week. She reports that she does not use drugs.    Home Medications:  Prior to Admission Medications       Prescriptions Last Dose Informant Patient Reported? Taking?    albuterol sulfate  (90 Base) MCG/ACT inhaler   No No    Inhale 2 puffs Every 4 (Four) Hours As Needed for Wheezing or Shortness of Air.              Allergies:  No Known Allergies    Objective      Vitals:   Temp:  [98.1 °F (36.7 °C)] 98.1 °F (36.7 °C)  Heart Rate:  [115-117] 115  Resp:  [19] 19  BP: (106-120)/(45-62) 108/62  Body mass index is 19.66 kg/m².  Physical Exam  Constitutional:       Comments: Emaciated chronically sick looking   HENT:      Head: Atraumatic.   Eyes:      General: Scleral icterus present.   Cardiovascular:      Rate and Rhythm: Tachycardia present.   Pulmonary:      Effort: Pulmonary effort is normal.      Breath sounds: Normal breath sounds.   Abdominal:      General: Bowel sounds are normal.      Palpations: Abdomen is soft.   Musculoskeletal:         General: Normal range of motion.      Cervical back: Neck supple.   Skin:     General: Skin is warm.   Neurological:      Mental Status: She is disoriented.   Psychiatric:         Mood and Affect: Mood normal.         Diagnostic Data:  Lab Results (last 24 hours)       Procedure  Component Value Units Date/Time    High Sensitivity Troponin T 2Hr [[id number]] Collected: 04/03/24 1612    Specimen: Blood Updated: 04/03/24 1618    STAT Lactic Acid, Reflex [[id number]] Collected: 04/03/24 1612    Specimen: Blood Updated: 04/03/24 1618    Urinalysis, Microscopic Only - Straight Cath [[id number]]  (Abnormal) Collected: 04/03/24 1551    Specimen: Urine from Straight Cath Updated: 04/03/24 1615     RBC, UA 3-5 /HPF      WBC, UA 6-10 /HPF      Bacteria, UA 4+ /HPF      Squamous Epithelial Cells, UA 7-12 /HPF      Hyaline Casts, UA 3-6 /LPF      Methodology Manual Light Microscopy    Urine Culture - Urine, Straight Cath [[id number]] Collected: 04/03/24 1551    Specimen: Urine from Straight Cath Updated: 04/03/24 1615    Urine Drug Screen - Straight Cath [[id number]] Collected: 04/03/24 1551    Specimen: Urine from Straight Cath Updated: 04/03/24 1610    Urinalysis With Culture If Indicated - Straight Cath [[id number]]  (Abnormal) Collected: 04/03/24 1551    Specimen: Urine from Straight Cath Updated: 04/03/24 1607     Color, UA Orange     Comment: Any Substance that causes an abnormal urine color can alter the accuracy of the chemical reactions.        Appearance, UA Cloudy     pH, UA 5.5     Specific Gravity, UA 1.029     Glucose, UA Negative     Ketones, UA Trace     Bilirubin, UA Small (1+)     Comment: Confirmation testing is unavailable.  A serum bilirubin is recommended for further assessment.        Blood, UA Negative     Protein, UA Negative     Leuk Esterase, UA Moderate (2+)     Nitrite, UA Positive     Urobilinogen, UA 2.0 E.U./dL    Narrative:      In absence of clinical symptoms, the presence of pyuria, bacteria, and/or nitrites on the urinalysis result does not correlate with infection.    Manual Differential [[id number]]  (Abnormal) Collected: 04/03/24 1359    Specimen: Blood Updated: 04/03/24 1512     Neutrophil % 84.0 %      Lymphocyte % 7.0 %      Monocyte % 7.0 %      Bands %  2.0 %       Neutrophils Absolute 13.35 10*3/mm3      Lymphocytes Absolute 1.09 10*3/mm3      Monocytes Absolute 1.09 10*3/mm3      Macrocytes Mod/2+     Ovalocytes Slight/1+     Polychromasia Slight/1+     Toxic Granulation Slight/1+     Platelet Morphology Normal    CBC & Differential [[id number]]  (Abnormal) Collected: 04/03/24 1359    Specimen: Blood Updated: 04/03/24 1512    Narrative:      The following orders were created for panel order CBC & Differential.  Procedure                               Abnormality         Status                     ---------                               -----------         ------                     CBC Auto Differential[[id number]]        Abnormal            Final result               Scan Slide[[id number]]                                       Final result                 Please view results for these tests on the individual orders.    CBC Auto Differential [[id number]]  (Abnormal) Collected: 04/03/24 1359    Specimen: Blood Updated: 04/03/24 1512     WBC 15.52 10*3/mm3      RBC 2.08 10*6/mm3      Hemoglobin 7.1 g/dL      Hematocrit 23.6 %      .5 fL      MCH 34.1 pg      MCHC 30.1 g/dL      RDW 21.6 %      RDW-SD 87.9 fl      MPV 10.5 fL      Platelets 85 10*3/mm3     Narrative:      The previously reported component NRBC is no longer being reported. Previous result was 0.0 /100 WBC (Reference Range: 0.0-0.2 /100 WBC) on 4/3/2024 at 1421 EDT.    Scan Slide [[id number]] Collected: 04/03/24 1359    Specimen: Blood Updated: 04/03/24 1512     Scan Slide --     Comment: See Manual Differential Results       Respiratory Panel PCR w/COVID-19(SARS-CoV-2) PEBBLES/DARRYN/ZULEYKA/PAD/COR/MARYBEL In-House, NP Swab in UTM/VTM, 2 HR TAT - Swab, Nasopharynx [[id number]]  (Normal) Collected: 04/03/24 1407    Specimen: Swab from Nasopharynx Updated: 04/03/24 1501     ADENOVIRUS, PCR Not Detected     Coronavirus 229E Not Detected     Coronavirus HKU1 Not Detected     Coronavirus NL63 Not Detected     Coronavirus OC43 Not  Detected     COVID19 Not Detected     Human Metapneumovirus Not Detected     Human Rhinovirus/Enterovirus Not Detected     Influenza A PCR Not Detected     Influenza B PCR Not Detected     Parainfluenza Virus 1 Not Detected     Parainfluenza Virus 2 Not Detected     Parainfluenza Virus 3 Not Detected     Parainfluenza Virus 4 Not Detected     RSV, PCR Not Detected     Bordetella pertussis pcr Not Detected     Bordetella parapertussis PCR Not Detected     Chlamydophila pneumoniae PCR Not Detected     Mycoplasma pneumo by PCR Not Detected    Narrative:      In the setting of a positive respiratory panel with a viral infection PLUS a negative procalcitonin without other underlying concern for bacterial infection, consider observing off antibiotics or discontinuation of antibiotics and continue supportive care. If the respiratory panel is positive for atypical bacterial infection (Bordetella pertussis, Chlamydophila pneumoniae, or Mycoplasma pneumoniae), consider antibiotic de-escalation to target atypical bacterial infection.    Extra Tubes [[id number]] Collected: 04/03/24 1359    Specimen: Blood, Venous Line Updated: 04/03/24 1500    Narrative:      The following orders were created for panel order Extra Tubes.  Procedure                               Abnormality         Status                     ---------                               -----------         ------                     Gold Top - [facility][[id number]]                                   Final result                 Please view results for these tests on the individual orders.    Gold Top - SST [[id number]] Collected: 04/03/24 1359    Specimen: Blood Updated: 04/03/24 1500     Extra Tube Hold for add-ons.     Comment: Auto resulted.       Comprehensive Metabolic Panel [[id number]]  (Abnormal) Collected: 04/03/24 1359    Specimen: Blood Updated: 04/03/24 1437     Glucose 119 mg/dL      BUN 36 mg/dL      Creatinine 1.12 mg/dL      Sodium 141 mmol/L      Potassium 4.8  mmol/L      Chloride 108 mmol/L      CO2 16.0 mmol/L      Calcium 10.1 mg/dL      Total Protein 7.2 g/dL      Albumin 3.3 g/dL      ALT (SGPT) 31 U/L      AST (SGOT) 79 U/L      Alkaline Phosphatase 167 U/L      Total Bilirubin 10.8 mg/dL      Globulin 3.9 gm/dL      A/G Ratio 0.8 g/dL      BUN/Creatinine Ratio 32.1     Anion Gap 17.0 mmol/L      eGFR 55.7 mL/min/1.73     Narrative:      GFR Normal >60  Chronic Kidney Disease <60  Kidney Failure <15      Magnesium [[id number]]  (Normal) Collected: 04/03/24 1359    Specimen: Blood Updated: 04/03/24 1437     Magnesium 2.1 mg/dL     Lipase [[id number]]  (Abnormal) Collected: 04/03/24 1359    Specimen: Blood Updated: 04/03/24 1436     Lipase 149 U/L     CK [[id number]]  (Normal) Collected: 04/03/24 1359    Specimen: Blood Updated: 04/03/24 1436     Creatine Kinase 25 U/L     High Sensitivity Troponin T [[id number]]  (Abnormal) Collected: 04/03/24 1359    Specimen: Blood Updated: 04/03/24 1436     HS Troponin T 19 ng/L     Narrative:      High Sensitive Troponin T Reference Range:  <14.0 ng/L- Negative Female for AMI  <22.0 ng/L- Negative Male for AMI  >=14 - Abnormal Female indicating possible myocardial injury.  >=22 - Abnormal Male indicating possible myocardial injury.   Clinicians would have to utilize clinical acumen, EKG, Troponin, and serial changes to determine if it is an Acute Myocardial Infarction or myocardial injury due to an underlying chronic condition.         Ethanol [[id number]] Collected: 04/03/24 1359    Specimen: Blood Updated: 04/03/24 1436     Ethanol % <0.010 %     Narrative:      Plasma Ethanol Clinical Symptoms:    ETOH (%)               Clinical Symptom  .01-.05              No apparent influence  .03-.12              Euphoria, Diminished judgment and attention   .09-.25              Impaired comprehension, Muscle incoordination  .18-.30              Confusion, Staggered gait, Slurred speech  .25-.40              Markedly decreased response to  stimuli, unable to stand or                        walk, vomitting, sleep or stupor  .35-.50              Comatose, Anesthesia, Subnormal body temperature        Ammonia [[id number]]  (Abnormal) Collected: 04/03/24 1405    Specimen: Blood Updated: 04/03/24 1431     Ammonia 131 umol/L     Protime-INR [[id number]]  (Abnormal) Collected: 04/03/24 1359    Specimen: Blood Updated: 04/03/24 1426     Protime 17.6 Seconds      INR 1.68    POC Glucose Once [[id number]]  (Normal) Collected: 04/03/24 1344    Specimen: Blood Updated: 04/03/24 1421     Glucose 101 mg/dL      Comment: Serial Number: [id number]:  [id number]       Blood Culture - Blood, Arm, Right [[id number]] Collected: 04/03/24 1400    Specimen: Blood from Arm, Right Updated: 04/03/24 1411    Blood Culture - Blood, Arm, Left [[id number]] Collected: 04/03/24 1359    Specimen: Blood from Arm, Left Updated: 04/03/24 1411    POC Lactate [[id number]]  (Abnormal) Collected: 04/03/24 1401    Specimen: Blood Updated: 04/03/24 1402     Lactate 3.0 mmol/L      Comment: Serial Number: [id number]:  [id number]                Imaging Results (Last 24 Hours)       Procedure Component Value Units Date/Time    CT Abdomen Pelvis With Contrast [[id number]] Collected: 04/03/24 1529     Updated: 04/03/24 1552    Narrative:      CT ABDOMEN PELVIS W CONTRAST    Date of Exam: 4/3/2024 3:12 PM EDT    Indication: elevated lipase/elevated LFTs.    Comparison: CT abdomen pelvis dated 3/14/2023    Technique: Axial CT images were obtained of the abdomen and pelvis following the uneventful intravenous administration of iodinated contrast. Sagittal and coronal reconstructions were performed.  Automated exposure control and iterative reconstruction   methods were used.        Findings:  There is respiratory artifact limiting the exam. The lung bases are clear.    The liver shows mild diffuse nodular surface. There is interval development of diffuse patchy heterogeneous appearance of the  liver parenchyma with ill distinct hypodense areas which could represent diffuse hepatocellular disease or multicentric liver   lesions not conclusive. The liver is enlarged. There are small calcified gallstones. There is mild amount of ascites. The spleen shows homogeneous density. The pancreas shows homogeneous density with no definite peripancreatic fat stranding. The   underlying respiratory fact limits evaluation for subtle changes. There are few borderline sized periportal and retroperitoneal lymph nodes. Few of the mildly enlarged measuring up to 1.2 cm. The aorta shows a normal diameter with atherosclerosis.    The kidneys show a normal position with no hydronephrosis or nephrolithiasis. The small and large bowel loops are normal in caliber. The bladder is unremarkable. Hysterectomy changes. There is a left inguinal hernia with omental fat content and fluid.   There is enlargement of the left ovarian vein and multiple enlarged left pelvic varices.      Impression:      Impression:    1. Limited exam due to respiratory fact    2. Mild diffuse nodular liver surface concerning for cirrhosis. In addition there is interval development of diffuse patchy heterogeneity of the liver parenchyma unclear if represents diffuse hepatocellular disease or multicentric liver lesions. Further   characterization with a liver MRI with and without contrast recommended    3. Mild amount of ascites    4. Stable mild enlarged periportal and retroperitoneal lymph nodes    5. Cholelithiasis    6. No CT evidence of acute pancreatitis    7. Additional ancillary findings as above            Electronically Signed: Carl Chaves MD    4/3/2024 3:50 PM EDT    Workstation ID: [id number]    CT Head Without Contrast [[id number]] Collected: 04/03/24 1526     Updated: 04/03/24 1531    Narrative:      CT HEAD WO CONTRAST    Date of Exam: 4/3/2024 3:12 PM EDT    Indication: AMS.    Comparison: None available.    Technique: Axial CT images were  obtained of the head without contrast administration.  Coronal reconstructions were performed.  Automated exposure control and iterative reconstruction methods were used.      Findings: There is respiratory fat limiting the exam. There is diffuse brain atrophy. Periventricular hypodense areas compatible with chronic small vessel ischemia    There is no evidence of hemorrhage. There is no mass effect or midline shift.    There is no extracerebral collection.    Ventricles are normal in size and configuration for patient's stated age.     Posterior fossa is within normal limits.    Calvarium and skull base appear intact.  Visualized sinuses show no air fluid levels. Visualized orbits are unremarkable.      Impression:      Impression:  Brain atrophy with chronic microvascular ischemic changes    No evidence of acute intracranial abnormality    Limited exam due to motion artifact      Electronically Signed: Carl Chaves MD    4/3/2024 3:29 PM EDT    Workstation ID: [id number]    XR Chest 1 View [[id number]] Collected: 04/03/24 1444     Updated: 04/03/24 1448    Narrative:      XR CHEST 1 VW    Date of Exam: 4/3/2024 2:27 PM EDT    Indication: ams    Comparison: 11/30/2022.    Findings:  Heart and pulmonary vessels appear within normal limits. Lung fields are well inflated and clear of acute infiltrates or effusions. There is no pneumothorax.      Impression:      Impression:  No acute process.      Electronically Signed: Clara Byrne MD    4/3/2024 2:46 PM EDT    Workstation ID: [id number]              Assessment & Plan        This is a 64 y.o. female with:    Active and Resolved Problems  There are no hospital problems to display for this patient.    #Hepatic encephalopathy with altered mental status  #Alcoholic liver disease  -Daily drinker-elevated ammonia  - Elevated LFTs  - GI team has been consulted patient discriminant score 36 will be started on steroids  IV fluid and oral lactulose  - Monitor for withdrawal  zinc multivitamin thiamine and empirically Protonix. Xifaxan  was also added  -Dobbhoff placement given poor nutritional status  Will place patient on CIWA protocol    #JAZLYN likely from volume depletion   #anion gap metabolic acidosis  #Lactic acidosis  -Will give IV fluid hydration  - Monitor renal function  -Will avoid nephrotoxic medications    #UTI with cystitis  #-Leukocytosis with lactic acidosis  -Post-follow-up urine culture results  - Follow-up blood culture results  - Started on ceftriaxone with await culture result    #Anemia-likely from EtOH  - Hemoglobin 7; will monitor every 6 transfuse to keep hemoglobin above 7 g/dL    # Pancytopenia likely from alcohol intake  -Continue to monitor  - Nutrition team consult    Social work team to follow patient living condition; concern reported by daughter  APS has been informed     DVT prophylaxis:  No DVT prophylaxis order currently exists.        The patient desires to be as follows:    CODE STATUS:           DANETTE STEELE (Daughter)  [phone] (Mobile) ,  is the designated person to make medical decisions on the patient's behalf if She is incapable of doing so. This was clarified with patient and/or next of kin on 4/3/2024 during the course of this H&P.    Admission Status:  I believe this patient meets inpt  status.    Expected Length of Stay: More than 2 midnights    PDMP and Medication Dispenses via Sidebar reviewed and consistent with patient reported medications.    I discussed the patient's findings and my recommendations with patient and family.      Signature:     This document has been electronically signed by Prosper Dawson MD on April 3, 2024 16:43 EDT   [facility] ([facility]

## 2024-04-03 NOTE — ED NOTES
Bed and brief change performed by this nurse. Provided pt with hairbrush, mouth rinse, oral care kits,and extra pillows and blankets. Pt taking small sips of water and tolerating well. Pt denies any further needs at this time.
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
## 2024-04-04 ENCOUNTER — INPATIENT HOSPITAL (OUTPATIENT)
Dept: URBAN - METROPOLITAN AREA HOSPITAL 84 | Facility: HOSPITAL | Age: 64
End: 2024-04-04
Payer: MEDICARE

## 2024-04-04 DIAGNOSIS — I85.00 ESOPHAGEAL VARICES WITHOUT BLEEDING: ICD-10-CM

## 2024-04-04 DIAGNOSIS — F10.10 ALCOHOL ABUSE, UNCOMPLICATED: ICD-10-CM

## 2024-04-04 DIAGNOSIS — D72.829 ELEVATED WHITE BLOOD CELL COUNT, UNSPECIFIED: ICD-10-CM

## 2024-04-04 DIAGNOSIS — R41.82 ALTERED MENTAL STATUS, UNSPECIFIED: ICD-10-CM

## 2024-04-04 DIAGNOSIS — K70.31 ALCOHOLIC CIRRHOSIS OF LIVER WITH ASCITES: ICD-10-CM

## 2024-04-04 DIAGNOSIS — D53.9 NUTRITIONAL ANEMIA, UNSPECIFIED: ICD-10-CM

## 2024-04-04 DIAGNOSIS — K76.82 HEPATIC ENCEPHALOPATHY: ICD-10-CM

## 2024-04-04 LAB
ALBUMIN SERPL-MCNC: 2.8 G/DL (ref 3.5–5.2)
ALBUMIN/GLOB SERPL: 0.8 G/DL
ALP SERPL-CCNC: 166 U/L (ref 39–117)
ALT SERPL W P-5'-P-CCNC: 26 U/L (ref 1–33)
ANION GAP SERPL CALCULATED.3IONS-SCNC: 12 MMOL/L (ref 5–15)
ANISOCYTOSIS BLD QL: NORMAL
AST SERPL-CCNC: 60 U/L (ref 1–32)
BACTERIA SPEC AEROBE CULT: ABNORMAL
BASOPHILS # BLD AUTO: 0.03 10*3/MM3 (ref 0–0.2)
BASOPHILS # BLD AUTO: 0.03 10*3/MM3 (ref 0–0.2)
BASOPHILS NFR BLD AUTO: 0.3 % (ref 0–1.5)
BASOPHILS NFR BLD AUTO: 0.3 % (ref 0–1.5)
BILIRUB SERPL-MCNC: 9.4 MG/DL (ref 0–1.2)
BUN SERPL-MCNC: 31 MG/DL (ref 8–23)
BUN/CREAT SERPL: 30.7 (ref 7–25)
CALCIUM SPEC-SCNC: 9.2 MG/DL (ref 8.6–10.5)
CHLORIDE SERPL-SCNC: 114 MMOL/L (ref 98–107)
CO2 SERPL-SCNC: 17 MMOL/L (ref 22–29)
CREAT SERPL-MCNC: 1.01 MG/DL (ref 0.57–1)
D-LACTATE SERPL-SCNC: 2.3 MMOL/L (ref 0.5–2)
D-LACTATE SERPL-SCNC: 2.8 MMOL/L (ref 0.5–2)
D-LACTATE SERPL-SCNC: 3.6 MMOL/L (ref 0.5–2)
DACRYOCYTES BLD QL SMEAR: NORMAL
DEPRECATED RDW RBC AUTO: 91.2 FL (ref 37–54)
DEPRECATED RDW RBC AUTO: 92.1 FL (ref 37–54)
EGFRCR SERPLBLD CKD-EPI 2021: 63.1 ML/MIN/1.73
EOSINOPHIL # BLD AUTO: 0 10*3/MM3 (ref 0–0.4)
EOSINOPHIL # BLD AUTO: 0.02 10*3/MM3 (ref 0–0.4)
EOSINOPHIL NFR BLD AUTO: 0 % (ref 0.3–6.2)
EOSINOPHIL NFR BLD AUTO: 0.2 % (ref 0.3–6.2)
ERYTHROCYTE [DISTWIDTH] IN BLOOD BY AUTOMATED COUNT: 22.1 % (ref 12.3–15.4)
ERYTHROCYTE [DISTWIDTH] IN BLOOD BY AUTOMATED COUNT: 24.6 % (ref 12.3–15.4)
FERRITIN SERPL-MCNC: 148.8 NG/ML (ref 13–150)
GLOBULIN UR ELPH-MCNC: 3.3 GM/DL
GLUCOSE SERPL-MCNC: 142 MG/DL (ref 65–99)
HCT VFR BLD AUTO: 20.1 % (ref 34–46.6)
HCT VFR BLD AUTO: 25 % (ref 34–46.6)
HGB BLD-MCNC: 6.1 G/DL (ref 12–15.9)
HGB BLD-MCNC: 7.6 G/DL (ref 12–15.9)
IMM GRANULOCYTES # BLD AUTO: 0.07 10*3/MM3 (ref 0–0.05)
IMM GRANULOCYTES # BLD AUTO: 0.1 10*3/MM3 (ref 0–0.05)
IMM GRANULOCYTES NFR BLD AUTO: 0.6 % (ref 0–0.5)
IMM GRANULOCYTES NFR BLD AUTO: 1.1 % (ref 0–0.5)
INR PPP: 1.85 (ref 0.93–1.1)
IRON 24H UR-MRATE: 27 MCG/DL (ref 37–145)
IRON SATN MFR SERPL: 20 % (ref 20–50)
LYMPHOCYTES # BLD AUTO: 0.75 10*3/MM3 (ref 0.7–3.1)
LYMPHOCYTES # BLD AUTO: 0.75 10*3/MM3 (ref 0.7–3.1)
LYMPHOCYTES NFR BLD AUTO: 6.9 % (ref 19.6–45.3)
LYMPHOCYTES NFR BLD AUTO: 7.9 % (ref 19.6–45.3)
MACROCYTES BLD QL SMEAR: NORMAL
MAGNESIUM SERPL-MCNC: 1.9 MG/DL (ref 1.6–2.4)
MCH RBC QN AUTO: 32.6 PG (ref 26.6–33)
MCH RBC QN AUTO: 34.1 PG (ref 26.6–33)
MCHC RBC AUTO-ENTMCNC: 30.3 G/DL (ref 31.5–35.7)
MCHC RBC AUTO-ENTMCNC: 30.4 G/DL (ref 31.5–35.7)
MCV RBC AUTO: 107.3 FL (ref 79–97)
MCV RBC AUTO: 112.3 FL (ref 79–97)
MONOCYTES # BLD AUTO: 0.86 10*3/MM3 (ref 0.1–0.9)
MONOCYTES # BLD AUTO: 0.98 10*3/MM3 (ref 0.1–0.9)
MONOCYTES NFR BLD AUTO: 9 % (ref 5–12)
MONOCYTES NFR BLD AUTO: 9.1 % (ref 5–12)
NEUTROPHILS NFR BLD AUTO: 7.72 10*3/MM3 (ref 1.7–7)
NEUTROPHILS NFR BLD AUTO: 8.98 10*3/MM3 (ref 1.7–7)
NEUTROPHILS NFR BLD AUTO: 81.6 % (ref 42.7–76)
NEUTROPHILS NFR BLD AUTO: 83 % (ref 42.7–76)
NRBC BLD AUTO-RTO: 0 /100 WBC (ref 0–0.2)
NRBC BLD AUTO-RTO: 0 /100 WBC (ref 0–0.2)
PLATELET # BLD AUTO: 102 10*3/MM3 (ref 140–450)
PLATELET # BLD AUTO: 61 10*3/MM3 (ref 140–450)
PMV BLD AUTO: 10.8 FL (ref 6–12)
PMV BLD AUTO: 12.4 FL (ref 6–12)
POIKILOCYTOSIS BLD QL SMEAR: NORMAL
POTASSIUM SERPL-SCNC: 3.9 MMOL/L (ref 3.5–5.2)
PROT SERPL-MCNC: 6.1 G/DL (ref 6–8.5)
PROTHROMBIN TIME: 19.3 SECONDS (ref 9.6–11.7)
QT INTERVAL: 339 MS
QTC INTERVAL: 478 MS
RBC # BLD AUTO: 1.79 10*6/MM3 (ref 3.77–5.28)
RBC # BLD AUTO: 2.33 10*6/MM3 (ref 3.77–5.28)
SMALL PLATELETS BLD QL SMEAR: NORMAL
SODIUM SERPL-SCNC: 143 MMOL/L (ref 136–145)
TIBC SERPL-MCNC: 137 MCG/DL (ref 298–536)
TRANSFERRIN SERPL-MCNC: 92 MG/DL (ref 200–360)
VIT B12 BLD-MCNC: >2000 PG/ML (ref 211–946)
WBC MORPH BLD: NORMAL
WBC NRBC COR # BLD AUTO: 10.83 10*3/MM3 (ref 3.4–10.8)
WBC NRBC COR # BLD AUTO: 9.46 10*3/MM3 (ref 3.4–10.8)

## 2024-04-04 PROCEDURE — 83540 ASSAY OF IRON: CPT | Performed by: INTERNAL MEDICINE

## 2024-04-04 PROCEDURE — 25010000002 MIDAZOLAM PER 1 MG: Performed by: STUDENT IN AN ORGANIZED HEALTH CARE EDUCATION/TRAINING PROGRAM

## 2024-04-04 PROCEDURE — 97167 OT EVAL HIGH COMPLEX 60 MIN: CPT

## 2024-04-04 PROCEDURE — 25010000002 THIAMINE HCL 200 MG/2ML SOLUTION 2 ML VIAL: Performed by: STUDENT IN AN ORGANIZED HEALTH CARE EDUCATION/TRAINING PROGRAM

## 2024-04-04 PROCEDURE — 86900 BLOOD TYPING SEROLOGIC ABO: CPT

## 2024-04-04 PROCEDURE — 97162 PT EVAL MOD COMPLEX 30 MIN: CPT

## 2024-04-04 PROCEDURE — 85007 BL SMEAR W/DIFF WBC COUNT: CPT | Performed by: STUDENT IN AN ORGANIZED HEALTH CARE EDUCATION/TRAINING PROGRAM

## 2024-04-04 PROCEDURE — P9016 RBC LEUKOCYTES REDUCED: HCPCS

## 2024-04-04 PROCEDURE — 99232 SBSQ HOSP IP/OBS MODERATE 35: CPT | Performed by: NURSE PRACTITIONER

## 2024-04-04 PROCEDURE — 83605 ASSAY OF LACTIC ACID: CPT | Performed by: NURSE PRACTITIONER

## 2024-04-04 PROCEDURE — 83735 ASSAY OF MAGNESIUM: CPT | Performed by: STUDENT IN AN ORGANIZED HEALTH CARE EDUCATION/TRAINING PROGRAM

## 2024-04-04 PROCEDURE — 85025 COMPLETE CBC W/AUTO DIFF WBC: CPT | Performed by: INTERNAL MEDICINE

## 2024-04-04 PROCEDURE — 85610 PROTHROMBIN TIME: CPT | Performed by: NURSE PRACTITIONER

## 2024-04-04 PROCEDURE — 97535 SELF CARE MNGMENT TRAINING: CPT

## 2024-04-04 PROCEDURE — 25010000002 PIPERACILLIN SOD-TAZOBACTAM PER 1 G: Performed by: NURSE PRACTITIONER

## 2024-04-04 PROCEDURE — 80053 COMPREHEN METABOLIC PANEL: CPT | Performed by: STUDENT IN AN ORGANIZED HEALTH CARE EDUCATION/TRAINING PROGRAM

## 2024-04-04 PROCEDURE — 25010000002 THIAMINE PER 100 MG: Performed by: STUDENT IN AN ORGANIZED HEALTH CARE EDUCATION/TRAINING PROGRAM

## 2024-04-04 PROCEDURE — 36430 TRANSFUSION BLD/BLD COMPNT: CPT

## 2024-04-04 PROCEDURE — 36415 COLL VENOUS BLD VENIPUNCTURE: CPT | Performed by: NURSE PRACTITIONER

## 2024-04-04 PROCEDURE — 25810000003 LACTATED RINGERS PER 1000 ML: Performed by: STUDENT IN AN ORGANIZED HEALTH CARE EDUCATION/TRAINING PROGRAM

## 2024-04-04 PROCEDURE — 82728 ASSAY OF FERRITIN: CPT | Performed by: INTERNAL MEDICINE

## 2024-04-04 PROCEDURE — 84466 ASSAY OF TRANSFERRIN: CPT | Performed by: INTERNAL MEDICINE

## 2024-04-04 PROCEDURE — 85025 COMPLETE CBC W/AUTO DIFF WBC: CPT | Performed by: STUDENT IN AN ORGANIZED HEALTH CARE EDUCATION/TRAINING PROGRAM

## 2024-04-04 PROCEDURE — 63710000001 PREDNISOLONE PER 5 MG: Performed by: NURSE PRACTITIONER

## 2024-04-04 RX ORDER — LACTULOSE 10 G/15ML
10 SOLUTION ORAL 2 TIMES DAILY
Status: DISCONTINUED | OUTPATIENT
Start: 2024-04-04 | End: 2024-04-06

## 2024-04-04 RX ADMIN — THIAMINE HYDROCHLORIDE 500 MG: 100 INJECTION, SOLUTION INTRAMUSCULAR; INTRAVENOUS at 22:48

## 2024-04-04 RX ADMIN — THIAMINE HYDROCHLORIDE 500 MG: 100 INJECTION, SOLUTION INTRAMUSCULAR; INTRAVENOUS at 09:59

## 2024-04-04 RX ADMIN — MIDAZOLAM HYDROCHLORIDE 2 MG: 2 INJECTION, SOLUTION INTRAMUSCULAR; INTRAVENOUS at 20:48

## 2024-04-04 RX ADMIN — RIFAXIMIN 550 MG: 550 TABLET ORAL at 20:48

## 2024-04-04 RX ADMIN — THERA TABS 1 TABLET: TAB at 09:59

## 2024-04-04 RX ADMIN — PANTOPRAZOLE SODIUM 40 MG: 40 INJECTION, POWDER, FOR SOLUTION INTRAVENOUS at 20:48

## 2024-04-04 RX ADMIN — FOLIC ACID 1 MG: 5 INJECTION, SOLUTION INTRAMUSCULAR; INTRAVENOUS; SUBCUTANEOUS at 09:59

## 2024-04-04 RX ADMIN — PREDNISOLONE SODIUM PHOSPHATE 40 MG: 15 SOLUTION ORAL at 09:59

## 2024-04-04 RX ADMIN — PIPERACILLIN AND TAZOBACTAM 3.38 G: 3; .375 INJECTION, POWDER, FOR SOLUTION INTRAVENOUS at 23:25

## 2024-04-04 RX ADMIN — Medication 1000 ML: at 21:00

## 2024-04-04 RX ADMIN — SODIUM CHLORIDE, POTASSIUM CHLORIDE, SODIUM LACTATE AND CALCIUM CHLORIDE 100 ML/HR: 600; 310; 30; 20 INJECTION, SOLUTION INTRAVENOUS at 19:33

## 2024-04-04 RX ADMIN — LACTULOSE 10 G: 20 SOLUTION ORAL at 20:48

## 2024-04-04 RX ADMIN — MIDAZOLAM HYDROCHLORIDE 2 MG: 2 INJECTION, SOLUTION INTRAMUSCULAR; INTRAVENOUS at 13:50

## 2024-04-04 RX ADMIN — Medication 10 ML: at 20:48

## 2024-04-04 RX ADMIN — Medication 220 MG: at 09:59

## 2024-04-04 RX ADMIN — MIDAZOLAM HYDROCHLORIDE 4 MG: 2 INJECTION, SOLUTION INTRAMUSCULAR; INTRAVENOUS at 04:10

## 2024-04-04 RX ADMIN — THIAMINE HYDROCHLORIDE 500 MG: 100 INJECTION, SOLUTION INTRAMUSCULAR; INTRAVENOUS at 13:50
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
## 2024-04-04 NOTE — PROGRESS NOTES
[facility] MEDICINE SERVICE  DAILY PROGRESS NOTE    NAME: Nelda Alcocer  : 1960  MRN: [id number]      LOS: 1 day     PROVIDER OF SERVICE: Matthew Kaye MD    Chief Complaint: Hepatic encephalopathy    Subjective:     Interval History:    Patient is seen and examined at bedside while still in the ED, she was laying comfortably in bed, jaundiced appearing awake and alert but not oriented.  She is able to answer yes or no to some questions but unable to follow conversations.  Other than hypotension she is stable      Objective:     Vital Signs  Temp:  [98.1 °F (36.7 °C)-99 °F (37.2 °C)] 98.8 °F (37.1 °C)  Heart Rate:  [] 85  Resp:  [12-19] 14  BP: ()/(45-62) 80/49   Body mass index is 19.66 kg/m².    Physical Exam  Physical Exam  Appearance: No apparent distress, non-toxic appearing   HEENT/Neck: Neck is supple, Extraocular movements intact,   Cardiovascular: Regular Rate and Rhythm, No murmurs, gallops or rubs   Pulmonary: Clear to auscultation Bilaterally.   Abdomen: BS+, Soft, non-tender, non-distended.   Ext: No Cyanosis, Clubbing, Edema.  Neuro: Non-focal, Alert & awake, confused         Scheduled Meds   folic acid 1 mg in sodium chloride 0.9 % 50 mL IVPB, 1 mg, Intravenous, Daily  lactulose, 20 g, Oral, BID  multivitamin, 1 tablet, Oral, Daily  pantoprazole, 40 mg, Intravenous, BID  piperacillin-tazobactam, 3.375 g, Intravenous, Q8H  prednisoLONE sodium phosphate, 40 mg, Oral, Daily  rifAXIMin, 550 mg, Oral, Q12H  sodium chloride, 10 mL, Intravenous, Q12H  thiamine (B-1) IV, 500 mg, Intravenous, Q8H   Followed by  [START ON 2024] thiamine (B-1) IV, 200 mg, Intravenous, Q8H   Followed by  [START ON 2024] thiamine, 100 mg, Oral, Daily  zinc sulfate, 220 mg, Oral, Daily       PRN Meds     LORazepam **OR** midazolam **OR** LORazepam **OR** midazolam **OR** midazolam **OR** midazolam    Magnesium Standard Dose Replacement - Follow Nurse / BPA Driven Protocol     ondansetron ODT **OR** ondansetron    Pharmacy to Dose Zosyn    [COMPLETED] Insert Peripheral IV **AND** sodium chloride    sodium chloride    sodium chloride   Infusions  lactated ringers, 100 mL/hr, Last Rate: 100 mL/hr (04/03/24 2104)  Pharmacy to Dose Zosyn,           Diagnostic Data    Results from last 7 days   Lab Units 04/04/24  0214 04/04/24  0054   WBC 10*3/mm3 10.83*  --    HEMOGLOBIN g/dL 6.1*  --    HEMATOCRIT % 20.1*  --    PLATELETS 10*3/mm3 102*  --    GLUCOSE mg/dL  --  142*   CREATININE mg/dL  --  1.01*   BUN mg/dL  --  31*   SODIUM mmol/L  --  143   POTASSIUM mmol/L  --  3.9   AST (SGOT) U/L  --  60*   ALT (SGPT) U/L  --  26   ALK PHOS U/L  --  166*   BILIRUBIN mg/dL  --  9.4*   ANION GAP mmol/L  --  12.0       CT Abdomen Pelvis With Contrast    Result Date: 4/3/2024  Impression: 1. Limited exam due to respiratory fact 2. Mild diffuse nodular liver surface concerning for cirrhosis. In addition there is interval development of diffuse patchy heterogeneity of the liver parenchyma unclear if represents diffuse hepatocellular disease or multicentric liver lesions. Further characterization with a liver MRI with and without contrast recommended 3. Mild amount of ascites 4. Stable mild enlarged periportal and retroperitoneal lymph nodes 5. Cholelithiasis 6. No CT evidence of acute pancreatitis 7. Additional ancillary findings as above Electronically Signed: Carl Chaves MD  4/3/2024 3:50 PM EDT  Workstation ID: [id number]    CT Head Without Contrast    Result Date: 4/3/2024  Impression: Brain atrophy with chronic microvascular ischemic changes No evidence of acute intracranial abnormality Limited exam due to motion artifact Electronically Signed: Carl Chaves MD  4/3/2024 3:29 PM EDT  Workstation ID: [id number]    XR Chest 1 View    Result Date: 4/3/2024  Impression: No acute process. Electronically Signed: Clara Byrne MD  4/3/2024 2:46 PM EDT  Workstation ID: [id number]       I reviewed the  patient's new clinical results.    Assessment/Plan:     Active and Resolved Problems  Active Hospital Problems    Diagnosis  POA    **Hepatic encephalopathy [K76.82]  Yes      Resolved Hospital Problems   No resolved problems to display.     Hepatic encephalopathy with altered mental status  Alcoholic liver disease  Alcohol withdrawal  -Patient presented with hepatic encephalopathy in the settings of cirrhosis.  -She had a discriminant factor of 36 on presentation and she was started on prednisolone.  -Gastroenterology following, appreciate recommendations.  -Continue lactulose and titrate for 3-4 bowel movements in a day.  -Hold off NGT placement as family is able to feed patient.  -will monitor patient for signs of withdrawal, try to limit benzodiazepine use.          Acute kidney injury  High anion gap metabolic acidosis  Lactic acidosis  Hypothyroidism  -She was placed on maintenance volume resuscitation, will give 1 L bolus and recheck blood pressure.  -Kidney function mildly improved, anion gap closed, still has a bicarbonate of 17.  -Trend lactic acid until clearance.        UTI with cystitis  Continue ceftriaxone, no growth on urine cultures so far.         Anemia  -She is status post 1 unit packed red blood cell transfusion for hemoglobin of 6.1.  Repeat hemoglobin pending, transfuse for hemoglobin less than 7.  -Platelet 102, continue to monitor.  -Check iron, folate and B12 level.             DVT prophylaxis:SCD  Full code  Continue inpatient level of care.  Plan discussed with patient and nurse              Signature: Electronically signed by Matthew Kaye MD, 04/04/24, 08:21 EDT.  [facility] Hospitalist Team
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
## 2024-04-04 NOTE — NURSING NOTE
Pt sat in chair for breakfast and lunch, pt was placed back in bed at 1400 to rest. Pt refused to let RN feed her lunch, she feed herself about 25% pt request fluids over food, pt has been given two boost drinks today to help with nutrition. Pt has not had a void on my shift. Bladder scan shows 300cc

## 2024-04-04 NOTE — CONSULTS
Nutrition Services    Patient Name: Nelda Alcocer  YOB: 1960  MRN: [id number]  Admission date: 4/3/2024    Comment:    At this time, there is no plan to obtain enteral access, as family is able to feed patient. If enteral nutrition is desired, please see recommendations below.    See MSA below.    Severe chronic disease related malnutrition related to diminished appetite in the setting of multiple chronic conditions as evidenced by PO intake meeting < 75% of estimated energy requirement for > 1 month and severe muscle/fat wasting per NFPE.    Will continue to monitor.     CLINICAL NUTRITION ASSESSMENT      Reason for Assessment 4/4 - TF assessment     H&P      Past Medical History:   Diagnosis Date    Ascites     COPD (chronic obstructive pulmonary disease)     H/O blood clots     left leg    Liver disease     Multiple sclerosis     Vertigo        Past Surgical History:   Procedure Laterality Date    HAND SURGERY Right     HYSTERECTOMY          Current Problems   Hepatic encephalopathy with altered mental status  Alcoholic liver disease   Alcohol withdrawal  - hepatic encephalopathy in the setting of cirrhosis  - Continue lactulose and titrate 3-4 bowel movements in a day  - Jaundiced    Acute kidney injury  High anion gap metabolic acidosis  Lactic acidosis  Hypothyroidism  - kidney function mildly improved     UTI with cystitis    Anemia  - monitor iron, folate, and B12 levels     Encounter Information        Trending Narrative     4/4 - Pt admitted to [facility] 4/3 with decreasing mental status and increasing weakness at home. Pt drinks heavy alcohol daily. Pt is poorly responsive and jaundiced. Consulted for tube feeds 4/4. Enteral access has not been obtained at this time. Per attending note 4/4 AM, will hold off on NGT placement at this time, as patient's family is able to feed her. If TF is desired in the coming days, initiate enteral prescription per recommendations below. Dietetic intern  "visited pt at bedside. NFPE completed, consistent with nutrition diagnosis of malnutrition using AND/ASPEN criteria. See MSA below. Will continue to monitor.     Anthropometrics        Current Height, Weight Height: 160 cm (63\")  Weight: 50.3 kg (111 lb) (04/03/24 1309)       Usual Body Weight (UBW) Unknown       Trending Weight Hx     This admission: 4/4 - 111 lbs              PTA: Weight stable x 1 year per weights in chart (no weight method recorded) - no additional recent weight hx for comparison.    Wt Readings from Last 30 Encounters:   04/03/24 1309 50.3 kg (111 lb)   03/13/23 1045 50.4 kg (111 lb 3.2 oz)   03/13/23 0800 53.1 kg (117 lb)   11/30/22 1116 55.6 kg (122 lb 9.2 oz)   03/16/20 2126 59.8 kg (131 lb 13.4 oz)   03/16/20 1657 61.2 kg (135 lb)   03/16/20 1620 61.2 kg (135 lb)   04/01/19 1105 58.3 kg (128 lb 9.6 oz)   07/24/18 0827 62.9 kg (138 lb 9.6 oz)   10/24/17 0950 58.2 kg (128 lb 6.1 oz)   06/08/17 0812 53.3 kg (117 lb 9.6 oz)   04/03/17 0915 51.9 kg (114 lb 6.4 oz)      BMI kg/m2 Body mass index is 19.66 kg/m².       Labs        Pertinent Labs Hyperglycemia  Reviewed. Management per attending.   Results from last 7 days   Lab Units 04/04/24  0054 04/03/24  1359   SODIUM mmol/L 143 141   POTASSIUM mmol/L 3.9 4.8   CHLORIDE mmol/L 114* 108*   CO2 mmol/L 17.0* 16.0*   BUN mg/dL 31* 36*   CREATININE mg/dL 1.01* 1.12*   CALCIUM mg/dL 9.2 10.1   BILIRUBIN mg/dL 9.4* 10.8*   ALK PHOS U/L 166* 167*   ALT (SGPT) U/L 26 31   AST (SGOT) U/L 60* 79*   GLUCOSE mg/dL 142* 119*     Results from last 7 days   Lab Units 04/04/24  0837 04/04/24  0214 04/04/24  0054 04/03/24  1359   MAGNESIUM mg/dL  --   --  1.9 2.1   HEMOGLOBIN g/dL 7.6*   < >  --  7.1*   HEMATOCRIT % 25.0*   < >  --  23.6*    < > = values in this interval not displayed.     No results found for: \"HGBA1C\"     Medications    Scheduled Medications aluminum sulfate-calcium acetate 1:20, 1,000 mL, Topical, Q8H  folic acid 1 mg in sodium chloride 0.9 % " 50 mL IVPB, 1 mg, Intravenous, Daily  lactulose, 10 g, Oral, BID  multivitamin, 1 tablet, Oral, Daily  pantoprazole, 40 mg, Intravenous, BID  piperacillin-tazobactam, 3.375 g, Intravenous, Q8H  prednisoLONE sodium phosphate, 40 mg, Oral, Daily  rifAXIMin, 550 mg, Oral, Q12H  sodium chloride, 10 mL, Intravenous, Q12H  thiamine (B-1) IV, 500 mg, Intravenous, Q8H   Followed by  [START ON 4/6/2024] thiamine (B-1) IV, 200 mg, Intravenous, Q8H   Followed by  [START ON 4/11/2024] thiamine, 100 mg, Oral, Daily  zinc sulfate, 220 mg, Oral, Daily        Infusions lactated ringers, 100 mL/hr, Last Rate: 100 mL/hr (04/03/24 2104)  Pharmacy to Dose Zosyn,         PRN Medications   LORazepam **OR** midazolam **OR** LORazepam **OR** midazolam **OR** midazolam **OR** midazolam    Magnesium Standard Dose Replacement - Follow Nurse / BPA Driven Protocol    ondansetron ODT **OR** ondansetron    Pharmacy to Dose Zosyn    [COMPLETED] Insert Peripheral IV **AND** sodium chloride    sodium chloride    sodium chloride     Physical Findings        Trending Physical   Appearance, NFPE 4/4 - NFPE completed, consistent with nutrition diagnosis of malnutrition using AND/ASPEN criteria. See MSA below.      --  Edema  No documented edema     Bowel Function No documented BM yet this admission     Tubes No feeding tube in place at this time     Chewing/Swallowing No documented chewing/swallowing issues     Skin Incontinence associated dermatitis   Stage II PI's to sacrum       Estimated/Assessed Needs       Energy Requirements    EST Needs, Method, Wt used 1509 - 2012 kcals/day (30-40 kcals/kg actual body weight - 50.3 kcals)       Protein Requirements    EST Needs, Method, Wt used 75 - 100 g PRO/day (1.5-2.0 g/kg actual body weight/day - 50.3 kg)       Fluid Requirements     Estimated Needs (mL/day) 500 mL + total output (fluid needs for JAZLYN)     Fluid Deficit    Current Na Level (mEq/L)    Desired Na Level (mEq/L)    Estimated Fluid Deficit (L)        Current Nutrition Orders & Evaluation of Intake       Oral Nutrition     Food Allergies NKFA   Current PO Diet Diet: Regular/House; Fluid Consistency: Thin (IDDSI 0)   Supplement No supplement ordered   PO Evaluation     Trending % PO Intake 4/4 - 25% average PO intake x 2 meals since admission     Enteral Nutrition    Enteral Route Consulted for TF, enteral access not yet obtained   Order, Modulars, Flushes    Residual/Tolerance    TF Observation         Parenteral Nutrition     TPN Route    Total # Days on TPN    TPN Order, Lipid Details    MVI & Trace Element Freq    TPN Observation       Nutritional Risk Screening        NRS-2002 Score          Nutrition Diagnosis         Nutrition Dx Problem 1 Severe chronic disease related malnutrition related to diminished appetite in the setting of multiple chronic conditions as evidenced by PO intake meeting < 75% of estimated energy requirement for > 1 month and severe muscle/fat wasting per NFPE.       Nutrition Dx Problem 2        Intervention Goal         Intervention Goal(s) Tolerate TF once enteral access obtained, if desired     Nutrition Intervention        RD Action NFPE completed  TF recommendations below  Initiate TF if desired per recommendations below.  Will continue to monitor     Nutrition Prescription          Diet Prescription Regular/house   Supplement Prescription No supplement ordered     Enteral Prescription Initial Goal:  *initial goal conservative d/t risk of RFS     Impact Peptide 1.5 at 10 mL/hr + 10 mL/hr water flush      End Goal:    Impact Peptide 1.5 at 50 mL/hr + FWF per clinical picture     Calories  1650 kcals (within range)    Protein  103 g (103% of upper end of estimated protein needs)    Free water  847 mL   Flushes       The above end goal rate is for 22 hrs/day to assume interruptions for ADLs. Water flushes adjusted based on clinical picture + Rx flushes/IV fluids     Specialized formula chosen r/t increased need for wound healing.             TPN Prescription      Monitor/Evaluation        Monitor Per protocol, Pertinent labs, EN delivery/tolerance, Weight, Skin status, GI status, Symptoms, Swallow function     Malnutrition Severity Assessment      Patient meets criteria for : (P) Severe Malnutrition  Malnutrition Type (Last 8 Hours)       Malnutrition Severity Assessment       Row Name 04/04/24 1415       Malnutrition Severity Assessment    Malnutrition Type Chronic Disease - Related Malnutrition (P)       Row Name 04/04/24 1415       Insufficient Energy Intake     Insufficient Energy Intake Findings Severe (P)     Insufficient Energy Intake  <75% of est. energy requirement for > or equal to 1 month (P)       Row Name 04/04/24 1415       Muscle Loss    Loss of Muscle Mass Findings Severe (P)     [place] Region Severe - deep hollowing/scooping, lack of muscle to touch, facial bones well defined (P)     Clavicle Bone Region Severe - protruding prominent bone (P)     Acromion Bone Region Severe - squared shoulders, bones, and acromion process protrusion prominent (P)     Scapular Bone Region Severe - prominent bones, depressions easily visible between ribs, scapula, spine, shoulders (P)     Dorsal Hand Region Severe - prominent depression (P)     Patellar Region Severe - prominent bone, square looking, very little muscle definition (P)     Posterior Calf Region Severe - thin with very little definition/firmness (P)       Row Name 04/04/24 1415       Fat Loss    Subcutaneous Fat Loss Findings Severe (P)     Orbital Region  Severe - pronounced hollowness/depression, dark circles, loose saggy skin (P)     Upper Arm Region Severe - mostly skin, very little space between folds, fingers touch (P)       Row Name 04/04/24 1415       Criteria Met (Must meet criteria for severity in at least 2 of these categories: M Wasting, Fat Loss, Fluid, Secondary Signs, Wt. Status, Intake)    Patient meets criteria for  Severe Malnutrition (P)                         Electronically signed by:  Emma Terry  04/04/24 13:42 EDT

## 2024-04-04 NOTE — NURSING NOTE
WOCN note:    64 yr old female admitted 4/3/24 from home with altered mental status. She has a hx of MS, alcohol and tobacco abuse and liver cirrhosis. Patient lives with her spouse who also consumes alcohol on a daily basis. Patient had not moved from her chair in 3 days and  called EMS. Per chart review patient arrived in the ED covered in urine and feces. WOCN consult received to assess perineum and buttocks.     Patient presents with extensive incontinence associated dermatitis with red denuded skin to her labia, perineum and buttocks. There are several skin erosions and partial thickness stage 2 pressure injuries to her sacrum. There are also dry excoriations to her lumbar area and a fecal management system in place.     Will order Domeboro soaks to the perineum and buttocks followed by Calazime zinc paste. Will also order an Agiliti low air loss bed surface for pressure and moisture control. Recommend to initiate pressure injury prevention measures and skin care with any episode of incontinence. We will continue to follow.
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
## 2024-04-04 NOTE — NURSING NOTE
RN assisted daughter with changing patient. She was incontinent of stool. She was cleaned and clean linens placed under patient. No further needs at this time. Call light within reach.
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
## 2024-04-04 NOTE — PLAN OF CARE
Problem: Fall Injury Risk  Goal: Absence of Fall and Fall-Related Injury  4/4/2024 1210 by Sameera Jeter RN  Outcome: Ongoing, Progressing  4/4/2024 1210 by Sameera Jeter RN  Outcome: Ongoing, Progressing  Intervention: Promote Injury-Free Environment  Recent Flowsheet Documentation  Taken 4/4/2024 1207 by Sameera Jeter RN  Safety Promotion/Fall Prevention: safety round/check completed  Taken 4/4/2024 1000 by Sameera Jeter RN  Safety Promotion/Fall Prevention: safety round/check completed  Taken 4/4/2024 0800 by Sameera Jeter RN  Safety Promotion/Fall Prevention: safety round/check completed     Problem: Pain Acute  Goal: Acceptable Pain Control and Functional Ability  Outcome: Ongoing, Progressing  Intervention: Optimize Psychosocial Wellbeing  Recent Flowsheet Documentation  Taken 4/4/2024 1207 by Sameera Jeter RN  Diversional Activities: television     Problem: Activity and Energy Impairment (Excessive Substance Use)  Goal: Optimized Energy Level (Excessive Substance Use)  Outcome: Ongoing, Progressing     Problem: Behavior Regulation Impairment (Excessive Substance Use)  Goal: Improved Behavioral Control (Excessive Substance Use)  Outcome: Ongoing, Progressing     Problem: Decreased Participation and Engagement (Excessive Substance Use)  Goal: Increased Participation and Engagement (Excessive Substance Use)  Outcome: Ongoing, Progressing     Problem: Physiologic Impairment (Excessive Substance Use)  Goal: Improved Physiologic Symptoms (Excessive Substance Use)  Outcome: Ongoing, Progressing     Problem: Social, Occupational or Functional Impairment (Excessive Substance Use)  Goal: Enhanced Social, Occupational or Functional Skills (Excessive Substance Use)  Outcome: Ongoing, Progressing  Intervention: Promote Social, Occupational and Functional Ability  Recent Flowsheet Documentation  Taken 4/4/2024 1207 by Sameera Jeter RN  Trust Relationship/Rapport:   care explained   choices provided  Taken  4/4/2024 0800 by Sameera Jeter, RN  Trust Relationship/Rapport:   care explained   choices provided

## 2024-04-04 NOTE — PROGRESS NOTES
LOS: 1 day   Patient Care Team:  Kathy Hayes APRN as PCP - General (Nurse Practitioner)      Subjective     Interval History: Patient has been stable overnight.  Nurse reports that she did eat some with feeding assistance.  She has had large volume loose stools after lactulose and rectal tube has been placed.  No bleeding reported.    Subjective: Denies abdominal pain      ROS: Limited due to drowsiness  Review of Systems   Gastrointestinal:  Negative for abdominal pain and vomiting.   Psychiatric/Behavioral:  Positive for confusion.         Medication Review:   Scheduled Meds:folic acid 1 mg in sodium chloride 0.9 % 50 mL IVPB, 1 mg, Intravenous, Daily  lactulose, 10 g, Oral, BID  multivitamin, 1 tablet, Oral, Daily  pantoprazole, 40 mg, Intravenous, BID  piperacillin-tazobactam, 3.375 g, Intravenous, Q8H  prednisoLONE sodium phosphate, 40 mg, Oral, Daily  rifAXIMin, 550 mg, Oral, Q12H  sodium chloride, 10 mL, Intravenous, Q12H  thiamine (B-1) IV, 500 mg, Intravenous, Q8H   Followed by  [START ON 4/6/2024] thiamine (B-1) IV, 200 mg, Intravenous, Q8H   Followed by  [START ON 4/11/2024] thiamine, 100 mg, Oral, Daily  zinc sulfate, 220 mg, Oral, Daily      Continuous Infusions:lactated ringers, 100 mL/hr, Last Rate: 100 mL/hr (04/03/24 2104)  Pharmacy to Dose Zosyn,       PRN Meds:.  LORazepam **OR** midazolam **OR** LORazepam **OR** midazolam **OR** midazolam **OR** midazolam    Magnesium Standard Dose Replacement - Follow Nurse / BPA Driven Protocol    ondansetron ODT **OR** ondansetron    Pharmacy to Dose Zosyn    [COMPLETED] Insert Peripheral IV **AND** sodium chloride    sodium chloride    sodium chloride      Objective     Vital Signs  Temp:  [98.1 °F (36.7 °C)-99 °F (37.2 °C)] 98.8 °F (37.1 °C)  Heart Rate:  [] 116  Resp:  [12-22] 22  BP: ()/(45-64) 105/64    Physical Exam:    General Appearance:   Opens eyes but very drowsy and ill-appearing   Head:    Normocephalic, without obvious abnormality    Eyes:          Conjunctivae normal, icteric sclera   Ears:    Hearing intact   Throat:   No oral lesions, no thrush, oral mucosa moist       Lungs:     Respirations regular, even and unlabored       Abdomen:     Soft, non-tender, no rebound or guarding, non-distended   Rectal:     Deferred   Extremities:   No edema, no cyanosis, no redness   Skin:   No bleeding, bruising, + jaundice   Neurologic:   Sensation intact        Results Review:    CBC  Results from last 7 days   Lab Units 04/04/24  0837 04/04/24  0214 04/03/24  1359   RBC 10*6/mm3 2.33* 1.79* 2.08*   WBC 10*3/mm3 9.46 10.83* 15.52*   HEMOGLOBIN g/dL 7.6* 6.1* 7.1*   PLATELETS 10*3/mm3 61* 102* 85*       CMP  Results from last 7 days   Lab Units 04/04/24  0054 04/03/24  1405 04/03/24  1359   SODIUM mmol/L 143  --  141   POTASSIUM mmol/L 3.9  --  4.8   CHLORIDE mmol/L 114*  --  108*   CO2 mmol/L 17.0*  --  16.0*   BUN mg/dL 31*  --  36*   CREATININE mg/dL 1.01*  --  1.12*   GLUCOSE mg/dL 142*  --  119*   ALBUMIN g/dL 2.8*  --  3.3*   BILIRUBIN mg/dL 9.4*  --  10.8*   ALK PHOS U/L 166*  --  167*   AST (SGOT) U/L 60*  --  79*   ALT (SGPT) U/L 26  --  31   AMMONIA umol/L  --  131*  --        Amylase and Lipase  Results from last 7 days   Lab Units 04/03/24  1359   LIPASE U/L 149*       CRP         Imaging Results (Last 24 Hours)       Procedure Component Value Units Date/Time    CT Abdomen Pelvis With Contrast [[id number]] Collected: 04/03/24 1529     Updated: 04/03/24 1552    Narrative:      CT ABDOMEN PELVIS W CONTRAST    Date of Exam: 4/3/2024 3:12 PM EDT    Indication: elevated lipase/elevated LFTs.    Comparison: CT abdomen pelvis dated 3/14/2023    Technique: Axial CT images were obtained of the abdomen and pelvis following the uneventful intravenous administration of iodinated contrast. Sagittal and coronal reconstructions were performed.  Automated exposure control and iterative reconstruction   methods were used.        Findings:  There is respiratory  artifact limiting the exam. The lung bases are clear.    The liver shows mild diffuse nodular surface. There is interval development of diffuse patchy heterogeneous appearance of the liver parenchyma with ill distinct hypodense areas which could represent diffuse hepatocellular disease or multicentric liver   lesions not conclusive. The liver is enlarged. There are small calcified gallstones. There is mild amount of ascites. The spleen shows homogeneous density. The pancreas shows homogeneous density with no definite peripancreatic fat stranding. The   underlying respiratory fact limits evaluation for subtle changes. There are few borderline sized periportal and retroperitoneal lymph nodes. Few of the mildly enlarged measuring up to 1.2 cm. The aorta shows a normal diameter with atherosclerosis.    The kidneys show a normal position with no hydronephrosis or nephrolithiasis. The small and large bowel loops are normal in caliber. The bladder is unremarkable. Hysterectomy changes. There is a left inguinal hernia with omental fat content and fluid.   There is enlargement of the left ovarian vein and multiple enlarged left pelvic varices.      Impression:      Impression:    1. Limited exam due to respiratory fact    2. Mild diffuse nodular liver surface concerning for cirrhosis. In addition there is interval development of diffuse patchy heterogeneity of the liver parenchyma unclear if represents diffuse hepatocellular disease or multicentric liver lesions. Further   characterization with a liver MRI with and without contrast recommended    3. Mild amount of ascites    4. Stable mild enlarged periportal and retroperitoneal lymph nodes    5. Cholelithiasis    6. No CT evidence of acute pancreatitis    7. Additional ancillary findings as above            Electronically Signed: Carl Chaves MD    4/3/2024 3:50 PM EDT    Workstation ID: [id number]    CT Head Without Contrast [[id number]] Collected: 04/03/24 1526      Updated: 04/03/24 1531    Narrative:      CT HEAD WO CONTRAST    Date of Exam: 4/3/2024 3:12 PM EDT    Indication: AMS.    Comparison: None available.    Technique: Axial CT images were obtained of the head without contrast administration.  Coronal reconstructions were performed.  Automated exposure control and iterative reconstruction methods were used.      Findings: There is respiratory fat limiting the exam. There is diffuse brain atrophy. Periventricular hypodense areas compatible with chronic small vessel ischemia    There is no evidence of hemorrhage. There is no mass effect or midline shift.    There is no extracerebral collection.    Ventricles are normal in size and configuration for patient's stated age.     Posterior fossa is within normal limits.    Calvarium and skull base appear intact.  Visualized sinuses show no air fluid levels. Visualized orbits are unremarkable.      Impression:      Impression:  Brain atrophy with chronic microvascular ischemic changes    No evidence of acute intracranial abnormality    Limited exam due to motion artifact      Electronically Signed: Carl Chaves MD    4/3/2024 3:29 PM EDT    Workstation ID: [id number]    XR Chest 1 View [[id number]] Collected: 04/03/24 1444     Updated: 04/03/24 1448    Narrative:      XR CHEST 1 VW    Date of Exam: 4/3/2024 2:27 PM EDT    Indication: ams    Comparison: 11/30/2022.    Findings:  Heart and pulmonary vessels appear within normal limits. Lung fields are well inflated and clear of acute infiltrates or effusions. There is no pneumothorax.      Impression:      Impression:  No acute process.      Electronically Signed: Clara Byrne MD    4/3/2024 2:46 PM EDT    Workstation ID: [id number]              Assessment & Plan   64-year-old female with alcohol cirrhosis and continued alcohol use presented 4/3/2024 with decreasing mental status and increasing weakness and inability to care for herself at home.     Alcohol  hepatitis/cirrhosis  Cirrhosis previously complicated by ascites and nonbleeding esophageal varices  Abnormal MRI liver  Hepatic encephalopathy  Macrocytic anemia  Leukocytosis  UTI  Cholelithiasis  COPD  MS  History of DVT  Alcohol abuse       Plan:  Patient has remained stable overnight.  Still very drowsy and discussed with her bedside nurse.  She was having multiple loose bowel movements and has skin breakdown so rectal tube was placed overnight.  Nurse reports no rectal bleeding noted.  She has not been vomiting.  She has been eating some with feeding assistance.  Total bilirubin down to 9.4.  Alk phos 166 and AST 60.  Hemoglobin dropped to 6.1 and she has been transfused up to 7.6.  Platelets 61 today.  WBC 9.46.  She continues on lactulose and Xifaxan.  Continue PPI with no signs of GI bleeding.  She remains on prednisolone, zinc, and vitamins.  Encourage nutrition and nurse reports she is eating with assistance.  Continue to monitor labs.  Transfuse blood if needed.  AFP less than 2.  Needs complete alcohol abstinence.  Continue supportive care.  On Zosyn.      Melanie Rosales, APRN  04/04/24  11:53 EDT
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
## 2024-04-05 ENCOUNTER — INPATIENT HOSPITAL (OUTPATIENT)
Dept: URBAN - METROPOLITAN AREA HOSPITAL 84 | Facility: HOSPITAL | Age: 64
End: 2024-04-05
Payer: MEDICARE

## 2024-04-05 DIAGNOSIS — K70.30 ALCOHOLIC CIRRHOSIS OF LIVER WITHOUT ASCITES: ICD-10-CM

## 2024-04-05 PROBLEM — E43 SEVERE MALNUTRITION: Status: ACTIVE | Noted: 2024-04-05

## 2024-04-05 LAB
ALBUMIN SERPL-MCNC: 2.7 G/DL (ref 3.5–5.2)
ALBUMIN/GLOB SERPL: 0.8 G/DL
ALP SERPL-CCNC: 156 U/L (ref 39–117)
ALT SERPL W P-5'-P-CCNC: 25 U/L (ref 1–33)
AMMONIA BLD-SCNC: 41 UMOL/L (ref 11–51)
ANION GAP SERPL CALCULATED.3IONS-SCNC: 11 MMOL/L (ref 5–15)
AST SERPL-CCNC: 52 U/L (ref 1–32)
BASOPHILS # BLD AUTO: 0.03 10*3/MM3 (ref 0–0.2)
BASOPHILS NFR BLD AUTO: 0.3 % (ref 0–1.5)
BH BB BLOOD EXPIRATION DATE: NORMAL
BH BB BLOOD TYPE BARCODE: 6200
BH BB DISPENSE STATUS: NORMAL
BH BB PRODUCT CODE: NORMAL
BH BB UNIT NUMBER: NORMAL
BILIRUB SERPL-MCNC: 6.4 MG/DL (ref 0–1.2)
BUN SERPL-MCNC: 29 MG/DL (ref 8–23)
BUN/CREAT SERPL: 30.9 (ref 7–25)
CALCIUM SPEC-SCNC: 8.5 MG/DL (ref 8.6–10.5)
CHLORIDE SERPL-SCNC: 111 MMOL/L (ref 98–107)
CO2 SERPL-SCNC: 17 MMOL/L (ref 22–29)
CREAT SERPL-MCNC: 0.94 MG/DL (ref 0.57–1)
CROSSMATCH INTERPRETATION: NORMAL
D-LACTATE SERPL-SCNC: 1.8 MMOL/L (ref 0.5–2)
DEPRECATED RDW RBC AUTO: 100.2 FL (ref 37–54)
EGFRCR SERPLBLD CKD-EPI 2021: 68.7 ML/MIN/1.73
EOSINOPHIL # BLD AUTO: 0.02 10*3/MM3 (ref 0–0.4)
EOSINOPHIL NFR BLD AUTO: 0.2 % (ref 0.3–6.2)
ERYTHROCYTE [DISTWIDTH] IN BLOOD BY AUTOMATED COUNT: 26 % (ref 12.3–15.4)
FOLATE SERPL-MCNC: 18 NG/ML (ref 4.78–24.2)
GLOBULIN UR ELPH-MCNC: 3.5 GM/DL
GLUCOSE SERPL-MCNC: 106 MG/DL (ref 65–99)
HCT VFR BLD AUTO: 23.8 % (ref 34–46.6)
HGB BLD-MCNC: 7.5 G/DL (ref 12–15.9)
IMM GRANULOCYTES # BLD AUTO: 0.1 10*3/MM3 (ref 0–0.05)
IMM GRANULOCYTES NFR BLD AUTO: 0.9 % (ref 0–0.5)
INR PPP: 1.62 (ref 0.93–1.1)
LYMPHOCYTES # BLD AUTO: 0.8 10*3/MM3 (ref 0.7–3.1)
LYMPHOCYTES NFR BLD AUTO: 7.4 % (ref 19.6–45.3)
MCH RBC QN AUTO: 32.9 PG (ref 26.6–33)
MCHC RBC AUTO-ENTMCNC: 31.5 G/DL (ref 31.5–35.7)
MCV RBC AUTO: 104.4 FL (ref 79–97)
MONOCYTES # BLD AUTO: 1.02 10*3/MM3 (ref 0.1–0.9)
MONOCYTES NFR BLD AUTO: 9.4 % (ref 5–12)
NEUTROPHILS NFR BLD AUTO: 8.85 10*3/MM3 (ref 1.7–7)
NEUTROPHILS NFR BLD AUTO: 81.8 % (ref 42.7–76)
NRBC BLD AUTO-RTO: 0 /100 WBC (ref 0–0.2)
PLATELET # BLD AUTO: 87 10*3/MM3 (ref 140–450)
PMV BLD AUTO: 11.2 FL (ref 6–12)
POTASSIUM SERPL-SCNC: 4 MMOL/L (ref 3.5–5.2)
PROT SERPL-MCNC: 6.2 G/DL (ref 6–8.5)
PROTHROMBIN TIME: 17 SECONDS (ref 9.6–11.7)
RBC # BLD AUTO: 2.28 10*6/MM3 (ref 3.77–5.28)
SODIUM SERPL-SCNC: 139 MMOL/L (ref 136–145)
UNIT  ABO: NORMAL
UNIT  RH: NORMAL
WBC NRBC COR # BLD AUTO: 10.82 10*3/MM3 (ref 3.4–10.8)

## 2024-04-05 PROCEDURE — 25010000002 ONDANSETRON PER 1 MG: Performed by: STUDENT IN AN ORGANIZED HEALTH CARE EDUCATION/TRAINING PROGRAM

## 2024-04-05 PROCEDURE — 82140 ASSAY OF AMMONIA: CPT | Performed by: NURSE PRACTITIONER

## 2024-04-05 PROCEDURE — 99232 SBSQ HOSP IP/OBS MODERATE 35: CPT | Performed by: NURSE PRACTITIONER

## 2024-04-05 PROCEDURE — 85610 PROTHROMBIN TIME: CPT | Performed by: NURSE PRACTITIONER

## 2024-04-05 PROCEDURE — 85025 COMPLETE CBC W/AUTO DIFF WBC: CPT | Performed by: NURSE PRACTITIONER

## 2024-04-05 PROCEDURE — 82746 ASSAY OF FOLIC ACID SERUM: CPT | Performed by: INTERNAL MEDICINE

## 2024-04-05 PROCEDURE — 63710000001 PREDNISOLONE PER 5 MG: Performed by: NURSE PRACTITIONER

## 2024-04-05 PROCEDURE — 80053 COMPREHEN METABOLIC PANEL: CPT | Performed by: STUDENT IN AN ORGANIZED HEALTH CARE EDUCATION/TRAINING PROGRAM

## 2024-04-05 PROCEDURE — 83605 ASSAY OF LACTIC ACID: CPT

## 2024-04-05 PROCEDURE — 25010000002 THIAMINE HCL 200 MG/2ML SOLUTION 2 ML VIAL: Performed by: STUDENT IN AN ORGANIZED HEALTH CARE EDUCATION/TRAINING PROGRAM

## 2024-04-05 PROCEDURE — 25810000003 LACTATED RINGERS PER 1000 ML: Performed by: STUDENT IN AN ORGANIZED HEALTH CARE EDUCATION/TRAINING PROGRAM

## 2024-04-05 PROCEDURE — 25010000002 MIDAZOLAM PER 1 MG: Performed by: STUDENT IN AN ORGANIZED HEALTH CARE EDUCATION/TRAINING PROGRAM

## 2024-04-05 PROCEDURE — 25010000002 CEFTRIAXONE PER 250 MG: Performed by: INTERNAL MEDICINE

## 2024-04-05 PROCEDURE — 25010000002 PIPERACILLIN SOD-TAZOBACTAM PER 1 G: Performed by: NURSE PRACTITIONER

## 2024-04-05 PROCEDURE — 25010000002 THIAMINE PER 100 MG: Performed by: STUDENT IN AN ORGANIZED HEALTH CARE EDUCATION/TRAINING PROGRAM

## 2024-04-05 PROCEDURE — 97110 THERAPEUTIC EXERCISES: CPT

## 2024-04-05 RX ORDER — FUROSEMIDE 40 MG/1
40 TABLET ORAL DAILY
COMMUNITY
End: 2024-04-10 | Stop reason: HOSPADM

## 2024-04-05 RX ORDER — SPIRONOLACTONE 100 MG/1
100 TABLET, FILM COATED ORAL DAILY
COMMUNITY
End: 2024-04-10 | Stop reason: HOSPADM

## 2024-04-05 RX ORDER — GUAIFENESIN 200 MG/10ML
200 LIQUID ORAL EVERY 4 HOURS PRN
Status: DISCONTINUED | OUTPATIENT
Start: 2024-04-05 | End: 2024-04-10 | Stop reason: HOSPADM

## 2024-04-05 RX ORDER — FOLIC ACID 1 MG/1
1 TABLET ORAL DAILY
Status: DISCONTINUED | OUTPATIENT
Start: 2024-04-06 | End: 2024-04-08

## 2024-04-05 RX ORDER — FUROSEMIDE 20 MG/1
20 TABLET ORAL DAILY
Status: DISCONTINUED | OUTPATIENT
Start: 2024-04-05 | End: 2024-04-08

## 2024-04-05 RX ORDER — SPIRONOLACTONE 25 MG/1
12.5 TABLET ORAL DAILY
Status: DISCONTINUED | OUTPATIENT
Start: 2024-04-05 | End: 2024-04-08

## 2024-04-05 RX ORDER — PANTOPRAZOLE SODIUM 40 MG/1
40 TABLET, DELAYED RELEASE ORAL
Status: DISCONTINUED | OUTPATIENT
Start: 2024-04-05 | End: 2024-04-08

## 2024-04-05 RX ORDER — FERROUS SULFATE 324(65)MG
324 TABLET, DELAYED RELEASE (ENTERIC COATED) ORAL
Status: DISCONTINUED | OUTPATIENT
Start: 2024-04-05 | End: 2024-04-08

## 2024-04-05 RX ADMIN — RIFAXIMIN 550 MG: 550 TABLET ORAL at 07:59

## 2024-04-05 RX ADMIN — Medication 1000 ML: at 13:56

## 2024-04-05 RX ADMIN — SODIUM CHLORIDE, POTASSIUM CHLORIDE, SODIUM LACTATE AND CALCIUM CHLORIDE 100 ML/HR: 600; 310; 30; 20 INJECTION, SOLUTION INTRAVENOUS at 05:30

## 2024-04-05 RX ADMIN — PREDNISOLONE SODIUM PHOSPHATE 40 MG: 15 SOLUTION ORAL at 07:58

## 2024-04-05 RX ADMIN — SPIRONOLACTONE 12.5 MG: 25 TABLET ORAL at 11:32

## 2024-04-05 RX ADMIN — ONDANSETRON 4 MG: 2 INJECTION INTRAMUSCULAR; INTRAVENOUS at 02:02

## 2024-04-05 RX ADMIN — FOLIC ACID 1 MG: 5 INJECTION, SOLUTION INTRAMUSCULAR; INTRAVENOUS; SUBCUTANEOUS at 07:58

## 2024-04-05 RX ADMIN — THERA TABS 1 TABLET: TAB at 07:58

## 2024-04-05 RX ADMIN — Medication: at 21:48

## 2024-04-05 RX ADMIN — LACTULOSE 10 G: 20 SOLUTION ORAL at 07:59

## 2024-04-05 RX ADMIN — MIDAZOLAM HYDROCHLORIDE 2 MG: 2 INJECTION, SOLUTION INTRAMUSCULAR; INTRAVENOUS at 12:43

## 2024-04-05 RX ADMIN — THIAMINE HYDROCHLORIDE 500 MG: 100 INJECTION, SOLUTION INTRAMUSCULAR; INTRAVENOUS at 21:47

## 2024-04-05 RX ADMIN — CEFTRIAXONE 1000 MG: 1 INJECTION, POWDER, FOR SOLUTION INTRAMUSCULAR; INTRAVENOUS at 11:35

## 2024-04-05 RX ADMIN — Medication 220 MG: at 07:59

## 2024-04-05 RX ADMIN — LORAZEPAM 2 MG: 1 TABLET ORAL at 21:47

## 2024-04-05 RX ADMIN — Medication 1000 ML: at 05:12

## 2024-04-05 RX ADMIN — Medication 10 ML: at 08:08

## 2024-04-05 RX ADMIN — LACTULOSE 10 G: 20 SOLUTION ORAL at 21:47

## 2024-04-05 RX ADMIN — PANTOPRAZOLE SODIUM 40 MG: 40 INJECTION, POWDER, FOR SOLUTION INTRAVENOUS at 07:59

## 2024-04-05 RX ADMIN — RIFAXIMIN 550 MG: 550 TABLET ORAL at 21:47

## 2024-04-05 RX ADMIN — THIAMINE HYDROCHLORIDE 500 MG: 100 INJECTION, SOLUTION INTRAMUSCULAR; INTRAVENOUS at 05:10

## 2024-04-05 RX ADMIN — GUAIFENESIN 200 MG: 200 SOLUTION ORAL at 05:10

## 2024-04-05 RX ADMIN — PANTOPRAZOLE SODIUM 40 MG: 40 TABLET, DELAYED RELEASE ORAL at 18:56

## 2024-04-05 RX ADMIN — FERROUS SULFATE TAB EC 324 MG (65 MG FE EQUIVALENT) 324 MG: 324 (65 FE) TABLET DELAYED RESPONSE at 11:32

## 2024-04-05 RX ADMIN — THIAMINE HYDROCHLORIDE 500 MG: 100 INJECTION, SOLUTION INTRAMUSCULAR; INTRAVENOUS at 13:56

## 2024-04-05 RX ADMIN — FUROSEMIDE 20 MG: 20 TABLET ORAL at 11:32

## 2024-04-05 RX ADMIN — LORAZEPAM 1 MG: 1 TABLET ORAL at 05:11

## 2024-04-05 RX ADMIN — PIPERACILLIN AND TAZOBACTAM 3.38 G: 3; .375 INJECTION, POWDER, FOR SOLUTION INTRAVENOUS at 06:09

## 2024-04-05 NOTE — PROGRESS NOTES
Nutrition Services    Patient Name: Nelda Alcocer  YOB: 1960  MRN: [id number]  Admission date: 4/3/2024    PROGRESS NOTE      Encounter Information: Checking on plan for nutrition. Previously there had been some consideration of TF, but at this point, pt continues on PO with feeding assistance, but intake is extremely poor -- eating only about 25% at meals.     At this point, given the severity of malnutrition and only 25% food acceptance, RD does recommend proceeding with enteral nutrition support.       PO Diet: Diet: Cardiac; Low Sodium (2g); Fluid Consistency: Thin (IDDSI 0)   PO Supplements: None ordered    PO Intake:  Only accepting about 25% at meals       Current nutrition support: None currently ordered    Nutrition support review:        Labs (reviewed below): Reviewed and C/W clinical course        GI Function:  Stool Output  Stool (mL): 100 mL (FMS) (04/05/24 0540)  Stool Unmeasured Occurrence: 1 (04/05/24 0100)  Bowel Incontinence: Yes (04/05/24 0100)  Stool Amount: large (04/05/24 0100)          Nutrition Intervention Updates: Change diet to Regular. Intake is too poor to warrant any therapeutic restrictions at this time.    Please consult RD for Tube Feeding Assessment, if enteral nutrition to begin.      Results from last 7 days   Lab Units 04/05/24  0024 04/04/24 0054 04/03/24  1359   SODIUM mmol/L 139 143 141   POTASSIUM mmol/L 4.0 3.9 4.8   CHLORIDE mmol/L 111* 114* 108*   CO2 mmol/L 17.0* 17.0* 16.0*   BUN mg/dL 29* 31* 36*   CREATININE mg/dL 0.94 1.01* 1.12*   CALCIUM mg/dL 8.5* 9.2 10.1   BILIRUBIN mg/dL 6.4* 9.4* 10.8*   ALK PHOS U/L 156* 166* 167*   ALT (SGPT) U/L 25 26 31   AST (SGOT) U/L 52* 60* 79*   GLUCOSE mg/dL 106* 142* 119*     Results from last 7 days   Lab Units 04/05/24  0024 04/04/24  0214 04/04/24  0054 04/03/24  1359   MAGNESIUM mg/dL  --   --  1.9 2.1   HEMOGLOBIN g/dL 7.5*   < >  --  7.1*   HEMATOCRIT % 23.8*   < >  --  23.6*    < > = values in this  "interval not displayed.     COVID19   Date Value Ref Range Status   04/03/2024 Not Detected Not Detected - Ref. Range Final     No results found for: \"HGBA1C\"    RD to follow up per protocol.    Electronically signed by:  Tanisha Cramer RD  04/05/24 14:34 EDT    "

## 2024-04-05 NOTE — CASE MANAGEMENT/SOCIAL WORK
Continued Stay Note  [facility]     Patient Name: Nelda Alcocer  MRN: [id number]  Today's Date: 4/5/2024    Admit Date: 4/3/2024    Plan: SNF choices pending. Will require precert. PASRR approved. From home with family.   Discharge Plan       Row Name 04/05/24 1229       Plan    Plan SNF choices pending. Will require precert. PASRR approved. From home with family.    Patient/Family in Agreement with Plan yes    Provided Post Acute Provider List? Yes    Post Acute Provider List Nursing Home    Delivered To Support Person    Support Person Elma armendariz    Method of Delivery In person    Plan Comments CM met with patient's daughter, Elma, at bedside. Discussed PT recommendations for SNF, facility list provided. Daughter to discuss with patient's other daughter prior to providing choices. Barrier to D/C: 2L O2, IV abx, wound care following.                      Expected Discharge Date and Time       Expected Discharge Date Expected Discharge Time    Apr 8, 2024           Met with patient in room.  Maintained distance greater than six feet and spent less than 15 minutes in the room.       MACARIO SinclairN, RN    [address] [address]    Office: [phone]  Fax: [phone]

## 2024-04-05 NOTE — PLAN OF CARE
Goal Outcome Evaluation:     Patient assisted to the chair this afternoon. FMS removed. Patient oriented to self and place. Family at bedside.       Problem: Fall Injury Risk  Goal: Absence of Fall and Fall-Related Injury  Outcome: Ongoing, Progressing  Intervention: Identify and Manage Contributors  Recent Flowsheet Documentation  Taken 4/5/2024 1200 by Smiley Holliday RN  Medication Review/Management: medications reviewed  Taken 4/5/2024 0800 by Smiley Holliday RN  Medication Review/Management: medications reviewed  Intervention: Promote Injury-Free Environment  Recent Flowsheet Documentation  Taken 4/5/2024 1600 by Smiley Holliday RN  Safety Promotion/Fall Prevention:   activity supervised   clutter free environment maintained   nonskid shoes/slippers when out of bed   safety round/check completed  Taken 4/5/2024 1400 by Smiley Holliday RN  Safety Promotion/Fall Prevention:   activity supervised   clutter free environment maintained   nonskid shoes/slippers when out of bed   safety round/check completed  Taken 4/5/2024 1200 by Smiley Holliday RN  Safety Promotion/Fall Prevention:   activity supervised   clutter free environment maintained   nonskid shoes/slippers when out of bed   safety round/check completed  Taken 4/5/2024 0800 by Smiley Holliday RN  Safety Promotion/Fall Prevention:   activity supervised   clutter free environment maintained   nonskid shoes/slippers when out of bed   safety round/check completed

## 2024-04-05 NOTE — PROGRESS NOTES
" LOS: 2 days   Patient Care Team:  Kathy Hayes APRN as PCP - General (Nurse Practitioner)      Subjective   \"Didn't get any sleep\"    Interval History:    Patient states she had diarrhea yesterday.  Labs: Sodium potassium creatinine all normal.  Total bilirubin 6.4 (9.4), alk phos 156 (166), AST 52 (60), ALT 25.  Serum iron 27, iron saturation 20%.  Ammonia 41.  INR 1.62.  WBCs 10.82, hemoglobin stable at 7.5, platelets 87.  Maddrey discriminant function 30.8    ROS: Limited due to drowsiness  Review of Systems   Gastrointestinal:  Negative for abdominal pain and vomiting.   Psychiatric/Behavioral:  Positive for confusion.         Medication Review:   Scheduled Meds:aluminum sulfate-calcium acetate 1:20, 1,000 mL, Topical, Q8H  cefTRIAXone, 1,000 mg, Intravenous, Q24H  folic acid 1 mg in sodium chloride 0.9 % 50 mL IVPB, 1 mg, Intravenous, Daily  furosemide, 20 mg, Oral, Daily  lactulose, 10 g, Oral, BID  multivitamin, 1 tablet, Oral, Daily  pantoprazole, 40 mg, Intravenous, BID  prednisoLONE sodium phosphate, 40 mg, Oral, Daily  rifAXIMin, 550 mg, Oral, Q12H  sodium chloride, 10 mL, Intravenous, Q12H  spironolactone, 12.5 mg, Oral, Daily  thiamine (B-1) IV, 500 mg, Intravenous, Q8H   Followed by  [START ON 4/6/2024] thiamine (B-1) IV, 200 mg, Intravenous, Q8H   Followed by  [START ON 4/11/2024] thiamine, 100 mg, Oral, Daily  zinc sulfate, 220 mg, Oral, Daily      Continuous Infusions:lactated ringers, 100 mL/hr, Last Rate: 100 mL/hr (04/05/24 0530)  Pharmacy to Dose Zosyn,       PRN Meds:.  guaifenesin    LORazepam **OR** midazolam **OR** LORazepam **OR** midazolam **OR** midazolam **OR** midazolam    Magnesium Standard Dose Replacement - Follow Nurse / BPA Driven Protocol    ondansetron ODT **OR** ondansetron    Pharmacy to Dose Zosyn    [COMPLETED] Insert Peripheral IV **AND** sodium chloride    sodium chloride    sodium chloride      Objective thin and frail appearing in bed.  NAD.  No family present.  More " awake and alert today.    Vital Signs  Temp:  [97.3 °F (36.3 °C)-98.9 °F (37.2 °C)] 98.1 °F (36.7 °C)  Heart Rate:  [] 108  Resp:  [14-20] 19  BP: (102-141)/(55-85) 121/69    Physical Exam:    General Appearance:   Opens eyes but very drowsy and ill-appearing   Head:    Normocephalic, without obvious abnormality   Eyes:          Conjunctivae normal, icteric sclera   Ears:    Hearing intact   Throat:   No oral lesions, no thrush, oral mucosa moist       Lungs:     Respirations regular, even and unlabored       Abdomen:     Soft, non-tender, no rebound or guarding, mild to moderate distended positive tympany noted   Rectal:     Deferred   Extremities:   No edema, no cyanosis, no redness.  No peripheral edema.   Skin:   No bleeding, bruising, + jaundice   Neurologic:   Sensation intact        Results Review:    CBC  Results from last 7 days   Lab Units 04/05/24  0024 04/04/24  0837 04/04/24  0214 04/03/24  1359   RBC 10*6/mm3 2.28* 2.33* 1.79* 2.08*   WBC 10*3/mm3 10.82* 9.46 10.83* 15.52*   HEMOGLOBIN g/dL 7.5* 7.6* 6.1* 7.1*   PLATELETS 10*3/mm3 87* 61* 102* 85*       CMP  Results from last 7 days   Lab Units 04/05/24  0024 04/04/24  0054 04/03/24  1405 04/03/24  1359   SODIUM mmol/L 139 143  --  141   POTASSIUM mmol/L 4.0 3.9  --  4.8   CHLORIDE mmol/L 111* 114*  --  108*   CO2 mmol/L 17.0* 17.0*  --  16.0*   BUN mg/dL 29* 31*  --  36*   CREATININE mg/dL 0.94 1.01*  --  1.12*   GLUCOSE mg/dL 106* 142*  --  119*   ALBUMIN g/dL 2.7* 2.8*  --  3.3*   BILIRUBIN mg/dL 6.4* 9.4*  --  10.8*   ALK PHOS U/L 156* 166*  --  167*   AST (SGOT) U/L 52* 60*  --  79*   ALT (SGPT) U/L 25 26  --  31   AMMONIA umol/L 41  --  131*  --        Amylase and Lipase  Results from last 7 days   Lab Units 04/03/24  1359   LIPASE U/L 149*       CRP         Imaging Results (Last 24 Hours)       ** No results found for the last 24 hours. **              Assessment & Plan   64-year-old female with alcohol cirrhosis and continued alcohol use  presented 4/3/2024 with decreasing mental status and increasing weakness and inability to care for herself at home.     Alcohol hepatitis/cirrhosis  Cirrhosis previously complicated by ascites and nonbleeding esophageal varices  Abnormal MRI liver  Hepatic encephalopathy  Macrocytic anemia  Leukocytosis  UTI  Cholelithiasis  COPD  MS  History of DVT  Alcohol abuse       Plan:  Reji discriminant function 30.8 MELD NA 19  Globin stable at 7.5, ammonia levels normal today at 41.  LFTs are improving.  Iron studies are low.  Try and give IV iron.  Continue lactulose, Xifaxan, PPI, prednisolone, zinc and vitamins.  Continue to encourage good nutrition.  Change diet to low sodium due to abdominal distention. Low-dose diuretics have been started today.  Kidney function is intact.  Complete alcohol cessation.  Is on Rocephin.      Tish Jiang, MICHAELLE  04/05/24  10:42 EDT

## 2024-04-05 NOTE — PROGRESS NOTES
[facility] MEDICINE SERVICE  DAILY PROGRESS NOTE    NAME: Nelda Alcocer  : 1960  MRN: [id number]      LOS: 2 days     PROVIDER OF SERVICE: Nakul Khan MD    Chief Complaint: Hepatic encephalopathy    Subjective:         Subjective       Chief Complaint: worsening confusion      History of Present Illness: Nelda Alcocer is a 64 y.o. female with a CMH of multiple sclerosis, alcohol use with liver cirrhosis and tobacco abuse DVT lower extremity presented with poor oral intake and progressively worsening confusion.  Patient lives with her  and consents alcohol daily.  Per report patient has not removed from her chair in the last 3 days  was concerned and called EMS     In the ER patient was tachycardic in the range of 125 blood pressure 117/60 and saturating 99% on room air lab was significant for mildly elevated troponin of 19 which subsequently was 18; chloride of 108, bicarb of 16 and anion gap of 17.  Creatinine was elevated 1.12 with elevated BUN/creatinine ratio.  Alkaline phosphatase was 167 and AST was also elevated.  Total bilirubin was 10.8.  Lactic acid was 3 subsequently 3.4.  PT and INR were elevated 17.6 and 1.68 white cell count was 15.2 with neutrophil predominance.  Hemoglobin noted for 7.1 as compared to 12 with anemia  Microcytic features urinalysis was suggestive of infection with nitrate positive and WBC of 6-10 per microscopic field     Chest x-ray showed no acute process; CT of the head without contrast brain atrophy with microvascular ischemic changes and no other significant acute intracranial abnormality.  CT abdomen pelvis contrast showed liver cirrhosis with patchy heterogeneous liver parenchyma suggestive of hepatocellular disease or multicentric liver lesions recommended MRI.  Mild amount of ascites was stable mildly enlarged periportal and retroperitoneal lymph nodes.  Cholelithiasis was also noted        Interval History:    24-Patient  is seen and examined at bedside while still in the ED, she was laying comfortably in bed, jaundiced appearing awake and alert but not oriented.  She is able to answer yes or no to some questions but unable to follow conversations.  Other than hypotension she is stable  4/5/24 patient seen and examined in bed no acute distress, blood pressure stable, heart rate 111.Pt would benefit from SNF for continued PT at discharge.     Objective:     Vital Signs  Temp:  [97.3 °F (36.3 °C)-98.9 °F (37.2 °C)] 97.7 °F (36.5 °C)  Heart Rate:  [] 111  Resp:  [14-20] 14  BP: (102-145)/(55-83) 145/83  Flow (L/min):  [2] 2   Body mass index is 15.31 kg/m².    Physical Exam  Appearance: No apparent distress, non-toxic appearing   HEENT/Neck: Neck is supple, Extraocular movements intact,   Cardiovascular: Regular Rate and Rhythm, No murmurs, gallops or rubs   Pulmonary: Clear to auscultation Bilaterally.   Abdomen: BS+, Soft, non-tender, non-distended.   Ext: No Cyanosis, Clubbing, Edema.  Neuro: Non-focal, Alert & awake, confused     Scheduled Meds   aluminum sulfate-calcium acetate 1:20, 1,000 mL, Topical, Q8H  cefTRIAXone, 1,000 mg, Intravenous, Q24H  ferrous sulfate, 324 mg, Oral, Daily With Breakfast  [START ON 4/6/2024] folic acid, 1 mg, Oral, Daily  furosemide, 20 mg, Oral, Daily  lactulose, 10 g, Oral, BID  multivitamin, 1 tablet, Oral, Daily  pantoprazole, 40 mg, Oral, BID AC  prednisoLONE sodium phosphate, 40 mg, Oral, Daily  rifAXIMin, 550 mg, Oral, Q12H  sodium chloride, 10 mL, Intravenous, Q12H  spironolactone, 12.5 mg, Oral, Daily  thiamine (B-1) IV, 500 mg, Intravenous, Q8H   Followed by  [START ON 4/6/2024] thiamine (B-1) IV, 200 mg, Intravenous, Q8H   Followed by  [START ON 4/11/2024] thiamine, 100 mg, Oral, Daily  zinc sulfate, 220 mg, Oral, Daily       PRN Meds     guaifenesin    LORazepam **OR** midazolam **OR** LORazepam **OR** midazolam **OR** midazolam **OR** midazolam    Magnesium Standard Dose Replacement -  Follow Nurse / BPA Driven Protocol    ondansetron ODT **OR** ondansetron    [COMPLETED] Insert Peripheral IV **AND** sodium chloride    sodium chloride    sodium chloride   Infusions  lactated ringers, 100 mL/hr, Last Rate: 100 mL/hr (04/05/24 0530)          Diagnostic Data    Results from last 7 days   Lab Units 04/05/24  0024   WBC 10*3/mm3 10.82*   HEMOGLOBIN g/dL 7.5*   HEMATOCRIT % 23.8*   PLATELETS 10*3/mm3 87*   GLUCOSE mg/dL 106*   CREATININE mg/dL 0.94   BUN mg/dL 29*   SODIUM mmol/L 139   POTASSIUM mmol/L 4.0   AST (SGOT) U/L 52*   ALT (SGPT) U/L 25   ALK PHOS U/L 156*   BILIRUBIN mg/dL 6.4*   ANION GAP mmol/L 11.0       CT Abdomen Pelvis With Contrast    Result Date: 4/3/2024  Impression: 1. Limited exam due to respiratory fact 2. Mild diffuse nodular liver surface concerning for cirrhosis. In addition there is interval development of diffuse patchy heterogeneity of the liver parenchyma unclear if represents diffuse hepatocellular disease or multicentric liver lesions. Further characterization with a liver MRI with and without contrast recommended 3. Mild amount of ascites 4. Stable mild enlarged periportal and retroperitoneal lymph nodes 5. Cholelithiasis 6. No CT evidence of acute pancreatitis 7. Additional ancillary findings as above Electronically Signed: Carl Chaves MD  4/3/2024 3:50 PM EDT  Workstation ID: [id number]    CT Head Without Contrast    Result Date: 4/3/2024  Impression: Brain atrophy with chronic microvascular ischemic changes No evidence of acute intracranial abnormality Limited exam due to motion artifact Electronically Signed: Carl Chaves MD  4/3/2024 3:29 PM EDT  Workstation ID: [id number]    XR Chest 1 View    Result Date: 4/3/2024  Impression: No acute process. Electronically Signed: Clara Byrne MD  4/3/2024 2:46 PM EDT  Workstation ID: [id number]       I reviewed the patient's new clinical results.    Assessment/Plan:     Active and Resolved Problems  Active Hospital  Problems    Diagnosis  POA    **Hepatic encephalopathy [K76.82]  Yes    Severe malnutrition [E43]  Yes    Altered mental status, unspecified altered mental status type [R41.82]  Yes      Resolved Hospital Problems   No resolved problems to display.     Hepatic encephalopathy with altered mental status  Alcoholic liver disease  Alcohol withdrawal  -Patient presented with hepatic encephalopathy in the settings of cirrhosis.  -She had a discriminant factor of 36 on presentation and she was started on prednisolone.  -Gastroenterology following, appreciate recommendations.  -Continue lactulose and titrate for 3-4 bowel movements in a day.  -Hold off NGT placement as family is able to feed patient.  -will monitor patient for signs of withdrawal, try to limit benzodiazepine use.      Acute kidney injury  High anion gap metabolic acidosis  Lactic acidosis  Hypothyroidism  -She was placed on maintenance volume resuscitation, will give 1 L bolus and recheck blood pressure.  -Kidney function mildly improved, anion gap closed, still has a bicarbonate of 17.  -Trend lactic acid until clearance.    UTI with cystitis  Continue ceftriaxone, no growth on urine cultures so far.     Anemia  -She is status post 1 unit packed red blood cell transfusion for hemoglobin of 6.1.  Repeat hemoglobin pending, transfuse for hemoglobin less than 7.  -Platelet 102, continue to monitor.  -Check iron, folate and B12 level.     DVT prophylaxis:SCD  Full code  Continue inpatient level of care.  Plan discussed with patient and nurse  Pt would benefit from SNF for continued PT at discharge.     Signature: Electronically signed by Nakul Khan MD, 04/05/24, 13:43 EDT.  [facility] Hospitalist Team

## 2024-04-05 NOTE — PLAN OF CARE
Goal Outcome Evaluation:         Nelda Alcocer presents with functional mobility impairments which indicate the need for skilled intervention. Tolerating session today without incident. Pt limited by c/o nausea, fatigue; pt minimally participatory in activities. Will continue to follow and progress as tolerated. Pt would benefit from SNF for continued PT at discharge.
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
## 2024-04-05 NOTE — PLAN OF CARE
Goal Outcome Evaluation:   Pt came from ED to PCU. HR has been tachy. O2 dropped so was put on 2l NC. C/O nausea, and was given med. CIWA score of 8-10 treating. C/O rest leg syndrome

## 2024-04-05 NOTE — THERAPY TREATMENT NOTE
"Subjective: Pt agreeable to therapeutic plan of care. Pt refuses OOB activities. C/o nausea    Objective:     Bed mobility - Dependent for scooting up in bed; rolls with mod-max A 1.   Transfers - N/A or Not attempted.  Ambulation -  N/A or Not attempted.    Therapeutic Exercise - 10 Reps B ANGELA BELLE lying supine  Attempted to instruct pt in pursed lip breathing ex; pt minimally participatory    Vitals: Tachycardic  - 116 from start to end of session.    Pain: 4 VAS   Location: generalized  Intervention for pain: Repositioned and Increased Activity    Education: Provided education on the importance of mobility in the acute care setting    Assessment: Nelda Alcocer presents with functional mobility impairments which indicate the need for skilled intervention. Tolerating session today without incident. Pt limited by c/o nausea, fatigue; pt minimally participatory in activities. Will continue to follow and progress as tolerated. Pt would benefit from SNF for continued PT at discharge.     Plan/Recommendations:   If medically appropriate, Moderate Intensity Therapy recommended post-acute care. This is recommended as therapy feels the patient would require 3-4 days per week and wouldn't tolerate \"3 hour daily\" rehab intensity. SNF would be the preferred choice. If the patient does not agree to SNF, arrange HH or OP depending on home bound status. If patient is medically complex, consider LTACH. Pt requires no DME at discharge.     Pt desires Skilled Rehab placement at discharge. Pt cooperative; agreeable to therapeutic recommendations and plan of care.         Basic Mobility 6-click:  Rollin = Total, A lot = 2, A little = 3; 4 = None  Supine>Sit:   1 = Total, A lot = 2, A little = 3; 4 = None   Sit>Stand with arms:  1 = Total, A lot = 2, A little = 3; 4 = None  Bed>Chair:   1 = Total, A lot = 2, A little = 3; 4 = None  Ambulate in room:  1 = Total, A lot = 2, A little = 3; 4 = None  3-5 Steps with " railin = Total, A lot = 2, A little = 3; 4 = None  Score: 8    Modified [place]: 4 = Moderately severe disability (Unable to attend to own bodily needs without assistance, and unable to walk unassisted)     Post-Tx Position: Supine with HOB Elevated, Alarms activated, and Call light and personal items within reach  PPE: gloves

## 2024-04-06 ENCOUNTER — INPATIENT HOSPITAL (OUTPATIENT)
Dept: URBAN - METROPOLITAN AREA HOSPITAL 84 | Facility: HOSPITAL | Age: 64
End: 2024-04-06
Payer: MEDICARE

## 2024-04-06 ENCOUNTER — APPOINTMENT (OUTPATIENT)
Dept: GENERAL RADIOLOGY | Facility: HOSPITAL | Age: 64
End: 2024-04-06
Payer: MEDICARE

## 2024-04-06 DIAGNOSIS — K70.30 ALCOHOLIC CIRRHOSIS OF LIVER WITHOUT ASCITES: ICD-10-CM

## 2024-04-06 LAB
ALBUMIN SERPL-MCNC: 2.8 G/DL (ref 3.5–5.2)
ALBUMIN/GLOB SERPL: 0.8 G/DL
ALP SERPL-CCNC: 133 U/L (ref 39–117)
ALT SERPL W P-5'-P-CCNC: 31 U/L (ref 1–33)
AMMONIA BLD-SCNC: 54 UMOL/L (ref 11–51)
ANION GAP SERPL CALCULATED.3IONS-SCNC: 13 MMOL/L (ref 5–15)
ARTERIAL PATENCY WRIST A: POSITIVE
AST SERPL-CCNC: 80 U/L (ref 1–32)
ATMOSPHERIC PRESS: ABNORMAL MM[HG]
BASE EXCESS BLDA CALC-SCNC: -9 MMOL/L (ref 0–3)
BDY SITE: ABNORMAL
BILIRUB SERPL-MCNC: 6.1 MG/DL (ref 0–1.2)
BUN SERPL-MCNC: 33 MG/DL (ref 8–23)
BUN/CREAT SERPL: 30.3 (ref 7–25)
CALCIUM SPEC-SCNC: 9.2 MG/DL (ref 8.6–10.5)
CHLORIDE SERPL-SCNC: 110 MMOL/L (ref 98–107)
CO2 BLDA-SCNC: 16.4 MMOL/L (ref 22–29)
CO2 SERPL-SCNC: 16 MMOL/L (ref 22–29)
CREAT SERPL-MCNC: 1.09 MG/DL (ref 0.57–1)
DEPRECATED RDW RBC AUTO: 97.8 FL (ref 37–54)
EGFRCR SERPLBLD CKD-EPI 2021: 57.6 ML/MIN/1.73
ERYTHROCYTE [DISTWIDTH] IN BLOOD BY AUTOMATED COUNT: 25.5 % (ref 12.3–15.4)
GLOBULIN UR ELPH-MCNC: 3.7 GM/DL
GLUCOSE SERPL-MCNC: 97 MG/DL (ref 65–99)
HCO3 BLDA-SCNC: 15.5 MMOL/L (ref 21–28)
HCT VFR BLD AUTO: 28.4 % (ref 34–46.6)
HEMODILUTION: NO
HGB BLD-MCNC: 8.8 G/DL (ref 12–15.9)
INHALED O2 CONCENTRATION: 36 %
INR PPP: 1.47 (ref 0.93–1.1)
MCH RBC QN AUTO: 33 PG (ref 26.6–33)
MCHC RBC AUTO-ENTMCNC: 31 G/DL (ref 31.5–35.7)
MCV RBC AUTO: 106.4 FL (ref 79–97)
MODALITY: ABNORMAL
PCO2 BLDA: 28.3 MM HG (ref 35–48)
PH BLDA: 7.35 PH UNITS (ref 7.35–7.45)
PLATELET # BLD AUTO: 107 10*3/MM3 (ref 140–450)
PMV BLD AUTO: 10.9 FL (ref 6–12)
PO2 BLDA: 76.5 MM HG (ref 83–108)
POTASSIUM SERPL-SCNC: 4.6 MMOL/L (ref 3.5–5.2)
PROT SERPL-MCNC: 6.5 G/DL (ref 6–8.5)
PROTHROMBIN TIME: 15.6 SECONDS (ref 9.6–11.7)
RBC # BLD AUTO: 2.67 10*6/MM3 (ref 3.77–5.28)
SAO2 % BLDCOA: 94.7 % (ref 94–98)
SODIUM SERPL-SCNC: 139 MMOL/L (ref 136–145)
WBC NRBC COR # BLD AUTO: 13.78 10*3/MM3 (ref 3.4–10.8)

## 2024-04-06 PROCEDURE — 25010000002 THIAMINE PER 100 MG: Performed by: STUDENT IN AN ORGANIZED HEALTH CARE EDUCATION/TRAINING PROGRAM

## 2024-04-06 PROCEDURE — 3E0G76Z INTRODUCTION OF NUTRITIONAL SUBSTANCE INTO UPPER GI, VIA NATURAL OR ARTIFICIAL OPENING: ICD-10-PCS | Performed by: INTERNAL MEDICINE

## 2024-04-06 PROCEDURE — 25010000002 CEFTRIAXONE PER 250 MG: Performed by: INTERNAL MEDICINE

## 2024-04-06 PROCEDURE — 82140 ASSAY OF AMMONIA: CPT | Performed by: INTERNAL MEDICINE

## 2024-04-06 PROCEDURE — 85610 PROTHROMBIN TIME: CPT | Performed by: NURSE PRACTITIONER

## 2024-04-06 PROCEDURE — 25010000002 THIAMINE HCL 200 MG/2ML SOLUTION: Performed by: STUDENT IN AN ORGANIZED HEALTH CARE EDUCATION/TRAINING PROGRAM

## 2024-04-06 PROCEDURE — 99232 SBSQ HOSP IP/OBS MODERATE 35: CPT | Performed by: NURSE PRACTITIONER

## 2024-04-06 PROCEDURE — 63710000001 PREDNISOLONE PER 5 MG: Performed by: NURSE PRACTITIONER

## 2024-04-06 PROCEDURE — 85027 COMPLETE CBC AUTOMATED: CPT | Performed by: NURSE PRACTITIONER

## 2024-04-06 PROCEDURE — 74018 RADEX ABDOMEN 1 VIEW: CPT

## 2024-04-06 PROCEDURE — 25010000002 THIAMINE HCL 200 MG/2ML SOLUTION 2 ML VIAL: Performed by: STUDENT IN AN ORGANIZED HEALTH CARE EDUCATION/TRAINING PROGRAM

## 2024-04-06 PROCEDURE — 36600 WITHDRAWAL OF ARTERIAL BLOOD: CPT | Performed by: INTERNAL MEDICINE

## 2024-04-06 PROCEDURE — 82803 BLOOD GASES ANY COMBINATION: CPT | Performed by: INTERNAL MEDICINE

## 2024-04-06 PROCEDURE — 80053 COMPREHEN METABOLIC PANEL: CPT | Performed by: STUDENT IN AN ORGANIZED HEALTH CARE EDUCATION/TRAINING PROGRAM

## 2024-04-06 RX ORDER — LACTULOSE 10 G/15ML
20 SOLUTION ORAL 2 TIMES DAILY
Status: DISCONTINUED | OUTPATIENT
Start: 2024-04-06 | End: 2024-04-08

## 2024-04-06 RX ADMIN — Medication 10 ML: at 09:08

## 2024-04-06 RX ADMIN — RIFAXIMIN 550 MG: 550 TABLET ORAL at 09:07

## 2024-04-06 RX ADMIN — FERROUS SULFATE TAB EC 324 MG (65 MG FE EQUIVALENT) 324 MG: 324 (65 FE) TABLET DELAYED RESPONSE at 09:06

## 2024-04-06 RX ADMIN — LACTULOSE 20 G: 20 SOLUTION ORAL at 21:21

## 2024-04-06 RX ADMIN — CEFTRIAXONE 1000 MG: 1 INJECTION, POWDER, FOR SOLUTION INTRAMUSCULAR; INTRAVENOUS at 13:51

## 2024-04-06 RX ADMIN — FOLIC ACID 1 MG: 1 TABLET ORAL at 09:04

## 2024-04-06 RX ADMIN — Medication 1000 ML: at 21:21

## 2024-04-06 RX ADMIN — THIAMINE HYDROCHLORIDE 200 MG: 100 INJECTION, SOLUTION INTRAMUSCULAR; INTRAVENOUS at 21:21

## 2024-04-06 RX ADMIN — THIAMINE HYDROCHLORIDE 500 MG: 100 INJECTION, SOLUTION INTRAMUSCULAR; INTRAVENOUS at 05:12

## 2024-04-06 RX ADMIN — RIFAXIMIN 550 MG: 550 TABLET ORAL at 21:21

## 2024-04-06 RX ADMIN — SPIRONOLACTONE 12.5 MG: 25 TABLET ORAL at 09:04

## 2024-04-06 RX ADMIN — PANTOPRAZOLE SODIUM 40 MG: 40 TABLET, DELAYED RELEASE ORAL at 09:07

## 2024-04-06 RX ADMIN — PREDNISOLONE SODIUM PHOSPHATE 40 MG: 15 SOLUTION ORAL at 09:07

## 2024-04-06 RX ADMIN — Medication 10 ML: at 21:21

## 2024-04-06 RX ADMIN — PANTOPRAZOLE SODIUM 40 MG: 40 TABLET, DELAYED RELEASE ORAL at 17:44

## 2024-04-06 RX ADMIN — Medication 1000 ML: at 13:35

## 2024-04-06 RX ADMIN — THIAMINE HYDROCHLORIDE 500 MG: 100 INJECTION, SOLUTION INTRAMUSCULAR; INTRAVENOUS at 15:10

## 2024-04-06 RX ADMIN — LACTULOSE 10 G: 20 SOLUTION ORAL at 09:04

## 2024-04-06 RX ADMIN — THERA TABS 1 TABLET: TAB at 09:07

## 2024-04-06 RX ADMIN — FUROSEMIDE 20 MG: 20 TABLET ORAL at 09:04

## 2024-04-06 RX ADMIN — Medication 220 MG: at 09:04

## 2024-04-06 NOTE — CASE MANAGEMENT/SOCIAL WORK
Continued Stay Note  [facility]     Patient Name: Nelda Alcocer  MRN: [id number]  Today's Date: 4/6/2024    Admit Date: 4/3/2024    Plan: Referral to [facility];pending. Will require precert. PASRR approved. From home with family.   Discharge Plan       Row Name 04/06/24 1607       Plan    Plan Referral to [facility];pending. Will require precert. PASRR approved. From home with family.    Patient/Family in Agreement with Plan yes    Plan Comments Met with pt daughter's at bedside to discuss SNF choices. 1) [facility] 2) [facility] 3) [facility]. Referral sent to [facility], and liaison notified. Will require precert. PASRR approved.

## 2024-04-06 NOTE — PROGRESS NOTES
LOS: 3 days   Patient Care Team:  Kathy Hayes APRN as PCP - General (Nurse Practitioner)      Subjective    None, lethargic     Interval History:    Labs: Sodium potassium are normal.  Creatinine 1.09.  Total bilirubin 6.1 (6.4), alk phos 133 (156, AST 80 (52), ALT 31 (25).  Ammonia level 54 (41).  Folate 18.  INR 1.47 down from 1.62 yesterday.  WBCs 13.78 (10.82), hemoglobin 8.8 (7.5), platelets 107.  Sitting up in chair, unsteady. Nurse & family at bedside.   Only drank 1 Ensure yesterday, has only had a couple of sips today.     ROS:    Review of Systems   Respiratory:  Positive for cough.    Gastrointestinal:  Positive for abdominal distention. Negative for abdominal pain and vomiting.   Psychiatric/Behavioral:  Positive for confusion.         Medication Review:   Scheduled Meds:aluminum sulfate-calcium acetate 1:20, 1,000 mL, Topical, Q8H  cefTRIAXone, 1,000 mg, Intravenous, Q24H  ferrous sulfate, 324 mg, Oral, Daily With Breakfast  folic acid, 1 mg, Oral, Daily  furosemide, 20 mg, Oral, Daily  lactulose, 10 g, Oral, BID  multivitamin, 1 tablet, Oral, Daily  pantoprazole, 40 mg, Oral, BID AC  prednisoLONE sodium phosphate, 40 mg, Oral, Daily  rifAXIMin, 550 mg, Oral, Q12H  sodium chloride, 10 mL, Intravenous, Q12H  spironolactone, 12.5 mg, Oral, Daily  thiamine (B-1) IV, 500 mg, Intravenous, Q8H   Followed by  thiamine (B-1) IV, 200 mg, Intravenous, Q8H   Followed by  [START ON 4/11/2024] thiamine, 100 mg, Oral, Daily  zinc sulfate, 220 mg, Oral, Daily      Continuous Infusions:lactated ringers, 100 mL/hr, Last Rate: 100 mL/hr (04/05/24 0530)      PRN Meds:.  guaifenesin    LORazepam **OR** midazolam **OR** LORazepam **OR** midazolam **OR** midazolam **OR** midazolam    Magnesium Standard Dose Replacement - Follow Nurse / BPA Driven Protocol    ondansetron ODT **OR** ondansetron    [COMPLETED] Insert Peripheral IV **AND** sodium chloride    sodium chloride    sodium chloride      Objective thin and frail  appearing.  NAD.     Vital Signs  Temp:  [97.5 °F (36.4 °C)-98.3 °F (36.8 °C)] 97.9 °F (36.6 °C)  Heart Rate:  [100-116] 116  Resp:  [10-18] 14  BP: (125-148)/(76-87) 133/87    Physical Exam:    General Appearance:   Opens eyes but very drowsy and ill-appearing   Head:    Normocephalic, without obvious abnormality   Eyes:          Conjunctivae normal, icteric sclera   Ears:    Hearing intact   Throat:   No oral lesions, no thrush, oral mucosa moist       Lungs:     Respirations regular, even and unlabored       Abdomen:     Soft, non-tender, no rebound or guarding, moderate distended positive tympany noted   Rectal:     Deferred   Extremities:   No edema, no cyanosis, no redness.  No peripheral edema.   Skin:   No bleeding, bruising, + jaundice   Neurologic:   Sensation intact        Results Review:    CBC  Results from last 7 days   Lab Units 04/06/24  0512 04/05/24  0024 04/04/24  0837 04/04/24  0214 04/03/24  1359   RBC 10*6/mm3 2.67* 2.28* 2.33* 1.79* 2.08*   WBC 10*3/mm3 13.78* 10.82* 9.46 10.83* 15.52*   HEMOGLOBIN g/dL 8.8* 7.5* 7.6* 6.1* 7.1*   PLATELETS 10*3/mm3 107* 87* 61* 102* 85*       CMP  Results from last 7 days   Lab Units 04/06/24  1022 04/06/24  0512 04/05/24  0024 04/04/24  0054 04/03/24  1405 04/03/24  1359   SODIUM mmol/L  --  139 139 143  --  141   POTASSIUM mmol/L  --  4.6 4.0 3.9  --  4.8   CHLORIDE mmol/L  --  110* 111* 114*  --  108*   CO2 mmol/L  --  16.0* 17.0* 17.0*  --  16.0*   BUN mg/dL  --  33* 29* 31*  --  36*   CREATININE mg/dL  --  1.09* 0.94 1.01*  --  1.12*   GLUCOSE mg/dL  --  97 106* 142*  --  119*   ALBUMIN g/dL  --  2.8* 2.7* 2.8*  --  3.3*   BILIRUBIN mg/dL  --  6.1* 6.4* 9.4*  --  10.8*   ALK PHOS U/L  --  133* 156* 166*  --  167*   AST (SGOT) U/L  --  80* 52* 60*  --  79*   ALT (SGPT) U/L  --  31 25 26  --  31   AMMONIA umol/L 54*  --  41  --  131*  --        Amylase and Lipase  Results from last 7 days   Lab Units 04/03/24  1359   LIPASE U/L 149*       CRP          Imaging Results (Last 24 Hours)       ** No results found for the last 24 hours. **              Assessment & Plan   64-year-old female with alcohol cirrhosis and continued alcohol use presented 4/3/2024 with decreasing mental status and increasing weakness and inability to care for herself at home.     Alcohol hepatitis/cirrhosis  Cirrhosis previously complicated by ascites and nonbleeding esophageal varices  Abnormal MRI liver  Hepatic encephalopathy  Macrocytic anemia  Leukocytosis  UTI  Cholelithiasis  COPD  MS  History of DVT  Alcohol abuse       Plan:  Reji discriminant function 24, MELD NA 19  Insert dobhoff feeding tube & start tube feeds. Ok to continue to eat for pleasure.   Hemoglobin up to 8.8. LFTs are improving.  Iron studies are low.  Try and give IV iron.  Patient was not a candidate for IV iron so she had to be started on oral iron.  Increase lactulose, continue Xifaxan, PPI, prednisolone, zinc and vitamins.  Low-dose diuretics restarted yesterday and creatinine is up to 1.09.  Will monitor.    Complete alcohol cessation.  Continue Rocephin.  Paracentesis as needed      Tish Jiang, MICHAELLE  04/06/24  11:40 EDT

## 2024-04-06 NOTE — CONSULTS
"Nutrition Services    Patient Name: Nelda Alcocer  YOB: 1960  MRN: [id number]  Admission date: 4/3/2024    PROGRESS NOTE      Encounter Information: Check on for nutrition plan of care.  GI APRN consulted RD for tube feeding.  RD previously worked up TF recommendations.  NG in place.         PO Diet: Diet: Cardiac; Low Sodium (2g); Fluid Consistency: Thin (IDDSI 0)   PO Supplements: None ordered    PO Intake:  0% x 3 documented meals since admission        Current nutrition support:    Nutrition support review:        Labs (reviewed below): Reviewed, management per attending         GI Function:  Last documented BM 4/5 (yesterday)       Nutrition Intervention Updates: Begin Impact Peptide 1.5 at 10 mL/hr + 10 mL/hr water flush     Ordered Mag/Phos for 4/7 AM lab draw       Results from last 7 days   Lab Units 04/06/24  0512 04/05/24  0024 04/04/24  0054   SODIUM mmol/L 139 139 143   POTASSIUM mmol/L 4.6 4.0 3.9   CHLORIDE mmol/L 110* 111* 114*   CO2 mmol/L 16.0* 17.0* 17.0*   BUN mg/dL 33* 29* 31*   CREATININE mg/dL 1.09* 0.94 1.01*   CALCIUM mg/dL 9.2 8.5* 9.2   BILIRUBIN mg/dL 6.1* 6.4* 9.4*   ALK PHOS U/L 133* 156* 166*   ALT (SGPT) U/L 31 25 26   AST (SGOT) U/L 80* 52* 60*   GLUCOSE mg/dL 97 106* 142*     Results from last 7 days   Lab Units 04/06/24  0512 04/04/24  0214 04/04/24  0054 04/03/24  1359   MAGNESIUM mg/dL  --   --  1.9 2.1   HEMOGLOBIN g/dL 8.8*   < >  --  7.1*   HEMATOCRIT % 28.4*   < >  --  23.6*    < > = values in this interval not displayed.     COVID19   Date Value Ref Range Status   04/03/2024 Not Detected Not Detected - Ref. Range Final     No results found for: \"HGBA1C\"      RD to follow up per protocol.    Electronically signed by:  Grisel Santiago RD  04/06/24 14:02 EDT    "

## 2024-04-06 NOTE — NURSING NOTE
GI physician was informed that patient is not eating and has not had a BM and has only had one unmeasured urine since the start of the shift. GI physician ordered an NG tube placement and said he would order feeds. NG tube placed.

## 2024-04-06 NOTE — DISCHARGE PLACEMENT REQUEST
"Nelda Alcocer (64 y.o. Female)       Date of Birth   1960    Social Security Number       Address   [id number] [place] [address] [place] [facility]    Home Phone   [phone]    MRN   [id number]       Lutheran   Pentecostal    Marital Status                               Admission Date   4/3/24    Admission Type   Emergency    Admitting Provider       Attending Provider   Nakul Khan MD    Department, Room/Bed   [facility],        Discharge Date       Discharge Disposition       Discharge Destination                                 Attending Provider: Nakul Khan MD    Allergies: No Known Allergies    Isolation: None   Infection: None   Code Status: CPR    Ht: 160 cm (63\")   Wt: 43.1 kg (95 lb 0.3 oz)    Admission Cmt: None   Principal Problem: Hepatic encephalopathy [K76.82]                   Active Insurance as of 4/3/2024       Primary Coverage       Payor Plan Insurance Group Employer/Plan Group    ANTHEM MEDICARE REPLACEMENT ANTHEM MEDICARE ADVANTAGE [id number]       Payor Plan Address Payor Plan Phone Number Payor Plan Fax Number Effective Dates    [address] [phone]  2024 - None Entered    [facility] [address]         Subscriber Name Subscriber Birth Date Member ID       NELDA ALCOCER 1960 [id number]                     Emergency Contacts        (Rel.) Home Phone Work Phone Mobile Phone    DANETTE STEELE (Daughter) -- -- [phone]    Elma Lawson (Daughter) -- -- [phone]    MONTSEJAIDEN TORRES (Spouse) [phone] -- --                 History & Physical        Prosper Dawson MD at [address] [place] [address] Medicine Services  History & Physical    Patient Name: Nelda Alcocer  : 1960  MRN: [id number]  Primary Care Physician:  Provider, No Known  Date of admission: 4/3/2024  Date and Time of Service: 4/3/2024 at 5:45 pm     Subjective      Chief Complaint: worsening " confusion     History of Present Illness: Nelda Alcocer is a 64 y.o. female with a CMH of multiple sclerosis, alcohol use with liver cirrhosis and tobacco abuse DVT lower extremity presented with poor oral intake and progressively worsening confusion.  Patient lives with her  and consents alcohol daily.  Per report patient has not removed from her chair in the last 3 days  was concerned and called EMS    In the ER patient was tachycardic in the range of 125 blood pressure 117/60 and saturating 99% on room air lab was significant for mildly elevated troponin of 19 which subsequently was 18; chloride of 108, bicarb of 16 and anion gap of 17.  Creatinine was elevated 1.12 with elevated BUN/creatinine ratio.  Alkaline phosphatase was 167 and AST was also elevated.  Total bilirubin was 10.8.  Lactic acid was 3 subsequently 3.4.  PT and INR were elevated 17.6 and 1.68 white cell count was 15.2 with neutrophil predominance.  Hemoglobin noted for 7.1 as compared to 12 with anemia  Microcytic features urinalysis was suggestive of infection with nitrate positive and WBC of 6-10 per microscopic field    Chest x-ray showed no acute process; CT of the head without contrast brain atrophy with microvascular ischemic changes and no other significant acute intracranial abnormality.  CT abdomen pelvis contrast showed liver cirrhosis with patchy heterogeneous liver parenchyma suggestive of hepatocellular disease or multicentric liver lesions recommended MRI.  Mild amount of ascites was stable mildly enlarged periportal and retroperitoneal lymph nodes.  Cholelithiasis was also noted      Review of Systems  Limited exam  Personal History     Past Medical History:   Diagnosis Date    Ascites     COPD (chronic obstructive pulmonary disease)     H/O blood clots     left leg    Liver disease     Multiple sclerosis     Vertigo        Past Surgical History:   Procedure Laterality Date    HAND SURGERY Right     HYSTERECTOMY          Family History: family history includes Peripheral vascular disease in her mother. Otherwise pertinent FHx was reviewed and not pertinent to current issue.    Social History:  reports that she has been smoking cigarettes. She has a 52.5 pack-year smoking history. She has never used smokeless tobacco. She reports current alcohol use of about 77.0 standard drinks of alcohol per week. She reports that she does not use drugs.    Home Medications:  Prior to Admission Medications       Prescriptions Last Dose Informant Patient Reported? Taking?    albuterol sulfate  (90 Base) MCG/ACT inhaler   No No    Inhale 2 puffs Every 4 (Four) Hours As Needed for Wheezing or Shortness of Air.              Allergies:  No Known Allergies    Objective      Vitals:   Temp:  [98.1 °F (36.7 °C)] 98.1 °F (36.7 °C)  Heart Rate:  [115-117] 115  Resp:  [19] 19  BP: (106-120)/(45-62) 108/62  Body mass index is 19.66 kg/m².  Physical Exam  Constitutional:       Comments: Emaciated chronically sick looking   HENT:      Head: Atraumatic.   Eyes:      General: Scleral icterus present.   Cardiovascular:      Rate and Rhythm: Tachycardia present.   Pulmonary:      Effort: Pulmonary effort is normal.      Breath sounds: Normal breath sounds.   Abdominal:      General: Bowel sounds are normal.      Palpations: Abdomen is soft.   Musculoskeletal:         General: Normal range of motion.      Cervical back: Neck supple.   Skin:     General: Skin is warm.   Neurological:      Mental Status: She is disoriented.   Psychiatric:         Mood and Affect: Mood normal.         Diagnostic Data:  Lab Results (last 24 hours)       Procedure Component Value Units Date/Time    High Sensitivity Troponin T 2Hr [[id number]] Collected: 04/03/24 1612    Specimen: Blood Updated: 04/03/24 1618    STAT Lactic Acid, Reflex [[id number]] Collected: 04/03/24 1612    Specimen: Blood Updated: 04/03/24 1618    Urinalysis, Microscopic Only - Straight Cath [[id number]]   (Abnormal) Collected: 04/03/24 1551    Specimen: Urine from Straight Cath Updated: 04/03/24 1615     RBC, UA 3-5 /HPF      WBC, UA 6-10 /HPF      Bacteria, UA 4+ /HPF      Squamous Epithelial Cells, UA 7-12 /HPF      Hyaline Casts, UA 3-6 /LPF      Methodology Manual Light Microscopy    Urine Culture - Urine, Straight Cath [[id number]] Collected: 04/03/24 1551    Specimen: Urine from Straight Cath Updated: 04/03/24 1615    Urine Drug Screen - Straight Cath [[id number]] Collected: 04/03/24 1551    Specimen: Urine from Straight Cath Updated: 04/03/24 1610    Urinalysis With Culture If Indicated - Straight Cath [[id number]]  (Abnormal) Collected: 04/03/24 1551    Specimen: Urine from Straight Cath Updated: 04/03/24 1607     Color, UA Orange     Comment: Any Substance that causes an abnormal urine color can alter the accuracy of the chemical reactions.        Appearance, UA Cloudy     pH, UA 5.5     Specific Gravity, UA 1.029     Glucose, UA Negative     Ketones, UA Trace     Bilirubin, UA Small (1+)     Comment: Confirmation testing is unavailable.  A serum bilirubin is recommended for further assessment.        Blood, UA Negative     Protein, UA Negative     Leuk Esterase, UA Moderate (2+)     Nitrite, UA Positive     Urobilinogen, UA 2.0 E.U./dL    Narrative:      In absence of clinical symptoms, the presence of pyuria, bacteria, and/or nitrites on the urinalysis result does not correlate with infection.    Manual Differential [[id number]]  (Abnormal) Collected: 04/03/24 1359    Specimen: Blood Updated: 04/03/24 1512     Neutrophil % 84.0 %      Lymphocyte % 7.0 %      Monocyte % 7.0 %      Bands %  2.0 %      Neutrophils Absolute 13.35 10*3/mm3      Lymphocytes Absolute 1.09 10*3/mm3      Monocytes Absolute 1.09 10*3/mm3      Macrocytes Mod/2+     Ovalocytes Slight/1+     Polychromasia Slight/1+     Toxic Granulation Slight/1+     Platelet Morphology Normal    CBC & Differential [[id number]]  (Abnormal) Collected:  04/03/24 1359    Specimen: Blood Updated: 04/03/24 1512    Narrative:      The following orders were created for panel order CBC & Differential.  Procedure                               Abnormality         Status                     ---------                               -----------         ------                     CBC Auto Differential[[id number]]        Abnormal            Final result               Scan Slide[[id number]]                                       Final result                 Please view results for these tests on the individual orders.    CBC Auto Differential [[id number]]  (Abnormal) Collected: 04/03/24 1359    Specimen: Blood Updated: 04/03/24 1512     WBC 15.52 10*3/mm3      RBC 2.08 10*6/mm3      Hemoglobin 7.1 g/dL      Hematocrit 23.6 %      .5 fL      MCH 34.1 pg      MCHC 30.1 g/dL      RDW 21.6 %      RDW-SD 87.9 fl      MPV 10.5 fL      Platelets 85 10*3/mm3     Narrative:      The previously reported component NRBC is no longer being reported. Previous result was 0.0 /100 WBC (Reference Range: 0.0-0.2 /100 WBC) on 4/3/2024 at 1421 EDT.    Scan Slide [[id number]] Collected: 04/03/24 1359    Specimen: Blood Updated: 04/03/24 1512     Scan Slide --     Comment: See Manual Differential Results       Respiratory Panel PCR w/COVID-19(SARS-CoV-2) PEBBLES/DARRYN/ZULEYKA/PAD/COR/MARYBEL In-House, NP Swab in UTM/VTM, 2 HR TAT - Swab, Nasopharynx [[id number]]  (Normal) Collected: 04/03/24 1407    Specimen: Swab from Nasopharynx Updated: 04/03/24 1501     ADENOVIRUS, PCR Not Detected     Coronavirus 229E Not Detected     Coronavirus HKU1 Not Detected     Coronavirus NL63 Not Detected     Coronavirus OC43 Not Detected     COVID19 Not Detected     Human Metapneumovirus Not Detected     Human Rhinovirus/Enterovirus Not Detected     Influenza A PCR Not Detected     Influenza B PCR Not Detected     Parainfluenza Virus 1 Not Detected     Parainfluenza Virus 2 Not Detected     Parainfluenza Virus 3 Not Detected      Parainfluenza Virus 4 Not Detected     RSV, PCR Not Detected     Bordetella pertussis pcr Not Detected     Bordetella parapertussis PCR Not Detected     Chlamydophila pneumoniae PCR Not Detected     Mycoplasma pneumo by PCR Not Detected    Narrative:      In the setting of a positive respiratory panel with a viral infection PLUS a negative procalcitonin without other underlying concern for bacterial infection, consider observing off antibiotics or discontinuation of antibiotics and continue supportive care. If the respiratory panel is positive for atypical bacterial infection (Bordetella pertussis, Chlamydophila pneumoniae, or Mycoplasma pneumoniae), consider antibiotic de-escalation to target atypical bacterial infection.    Extra Tubes [[id number]] Collected: 04/03/24 1359    Specimen: Blood, Venous Line Updated: 04/03/24 1500    Narrative:      The following orders were created for panel order Extra Tubes.  Procedure                               Abnormality         Status                     ---------                               -----------         ------                     Gold Top - SST[[id number]]                                   Final result                 Please view results for these tests on the individual orders.    Gold Top - SST [[id number]] Collected: 04/03/24 1359    Specimen: Blood Updated: 04/03/24 1500     Extra Tube Hold for add-ons.     Comment: Auto resulted.       Comprehensive Metabolic Panel [[id number]]  (Abnormal) Collected: 04/03/24 1359    Specimen: Blood Updated: 04/03/24 1437     Glucose 119 mg/dL      BUN 36 mg/dL      Creatinine 1.12 mg/dL      Sodium 141 mmol/L      Potassium 4.8 mmol/L      Chloride 108 mmol/L      CO2 16.0 mmol/L      Calcium 10.1 mg/dL      Total Protein 7.2 g/dL      Albumin 3.3 g/dL      ALT (SGPT) 31 U/L      AST (SGOT) 79 U/L      Alkaline Phosphatase 167 U/L      Total Bilirubin 10.8 mg/dL      Globulin 3.9 gm/dL      A/G Ratio 0.8 g/dL      BUN/Creatinine  Ratio 32.1     Anion Gap 17.0 mmol/L      eGFR 55.7 mL/min/1.73     Narrative:      GFR Normal >60  Chronic Kidney Disease <60  Kidney Failure <15      Magnesium [[id number]]  (Normal) Collected: 04/03/24 1359    Specimen: Blood Updated: 04/03/24 1437     Magnesium 2.1 mg/dL     Lipase [[id number]]  (Abnormal) Collected: 04/03/24 1359    Specimen: Blood Updated: 04/03/24 1436     Lipase 149 U/L     CK [[id number]]  (Normal) Collected: 04/03/24 1359    Specimen: Blood Updated: 04/03/24 1436     Creatine Kinase 25 U/L     High Sensitivity Troponin T [[id number]]  (Abnormal) Collected: 04/03/24 1359    Specimen: Blood Updated: 04/03/24 1436     HS Troponin T 19 ng/L     Narrative:      High Sensitive Troponin T Reference Range:  <14.0 ng/L- Negative Female for AMI  <22.0 ng/L- Negative Male for AMI  >=14 - Abnormal Female indicating possible myocardial injury.  >=22 - Abnormal Male indicating possible myocardial injury.   Clinicians would have to utilize clinical acumen, EKG, Troponin, and serial changes to determine if it is an Acute Myocardial Infarction or myocardial injury due to an underlying chronic condition.         Ethanol [[id number]] Collected: 04/03/24 1359    Specimen: Blood Updated: 04/03/24 1436     Ethanol % <0.010 %     Narrative:      Plasma Ethanol Clinical Symptoms:    ETOH (%)               Clinical Symptom  .01-.05              No apparent influence  .03-.12              Euphoria, Diminished judgment and attention   .09-.25              Impaired comprehension, Muscle incoordination  .18-.30              Confusion, Staggered gait, Slurred speech  .25-.40              Markedly decreased response to stimuli, unable to stand or                        walk, vomitting, sleep or stupor  .35-.50              Comatose, Anesthesia, Subnormal body temperature        Ammonia [[id number]]  (Abnormal) Collected: 04/03/24 1405    Specimen: Blood Updated: 04/03/24 1431     Ammonia 131 umol/L     Protime-INR [[id number]]   (Abnormal) Collected: 04/03/24 1359    Specimen: Blood Updated: 04/03/24 1426     Protime 17.6 Seconds      INR 1.68    POC Glucose Once [[id number]]  (Normal) Collected: 04/03/24 1344    Specimen: Blood Updated: 04/03/24 1421     Glucose 101 mg/dL      Comment: Serial Number: [id number]:  [id number]       Blood Culture - Blood, Arm, Right [[id number]] Collected: 04/03/24 1400    Specimen: Blood from Arm, Right Updated: 04/03/24 1411    Blood Culture - Blood, Arm, Left [[id number]] Collected: 04/03/24 1359    Specimen: Blood from Arm, Left Updated: 04/03/24 1411    POC Lactate [[id number]]  (Abnormal) Collected: 04/03/24 1401    Specimen: Blood Updated: 04/03/24 1402     Lactate 3.0 mmol/L      Comment: Serial Number: [id number]:  [id number]                Imaging Results (Last 24 Hours)       Procedure Component Value Units Date/Time    CT Abdomen Pelvis With Contrast [[id number]] Collected: 04/03/24 1529     Updated: 04/03/24 1552    Narrative:      CT ABDOMEN PELVIS W CONTRAST    Date of Exam: 4/3/2024 3:12 PM EDT    Indication: elevated lipase/elevated LFTs.    Comparison: CT abdomen pelvis dated 3/14/2023    Technique: Axial CT images were obtained of the abdomen and pelvis following the uneventful intravenous administration of iodinated contrast. Sagittal and coronal reconstructions were performed.  Automated exposure control and iterative reconstruction   methods were used.        Findings:  There is respiratory artifact limiting the exam. The lung bases are clear.    The liver shows mild diffuse nodular surface. There is interval development of diffuse patchy heterogeneous appearance of the liver parenchyma with ill distinct hypodense areas which could represent diffuse hepatocellular disease or multicentric liver   lesions not conclusive. The liver is enlarged. There are small calcified gallstones. There is mild amount of ascites. The spleen shows homogeneous density. The pancreas shows homogeneous  density with no definite peripancreatic fat stranding. The   underlying respiratory fact limits evaluation for subtle changes. There are few borderline sized periportal and retroperitoneal lymph nodes. Few of the mildly enlarged measuring up to 1.2 cm. The aorta shows a normal diameter with atherosclerosis.    The kidneys show a normal position with no hydronephrosis or nephrolithiasis. The small and large bowel loops are normal in caliber. The bladder is unremarkable. Hysterectomy changes. There is a left inguinal hernia with omental fat content and fluid.   There is enlargement of the left ovarian vein and multiple enlarged left pelvic varices.      Impression:      Impression:    1. Limited exam due to respiratory fact    2. Mild diffuse nodular liver surface concerning for cirrhosis. In addition there is interval development of diffuse patchy heterogeneity of the liver parenchyma unclear if represents diffuse hepatocellular disease or multicentric liver lesions. Further   characterization with a liver MRI with and without contrast recommended    3. Mild amount of ascites    4. Stable mild enlarged periportal and retroperitoneal lymph nodes    5. Cholelithiasis    6. No CT evidence of acute pancreatitis    7. Additional ancillary findings as above            Electronically Signed: Carl Chaves MD    4/3/2024 3:50 PM EDT    Workstation ID: [id number]    CT Head Without Contrast [[id number]] Collected: 04/03/24 1526     Updated: 04/03/24 1531    Narrative:      CT HEAD WO CONTRAST    Date of Exam: 4/3/2024 3:12 PM EDT    Indication: AMS.    Comparison: None available.    Technique: Axial CT images were obtained of the head without contrast administration.  Coronal reconstructions were performed.  Automated exposure control and iterative reconstruction methods were used.      Findings: There is respiratory fat limiting the exam. There is diffuse brain atrophy. Periventricular hypodense areas compatible with chronic  small vessel ischemia    There is no evidence of hemorrhage. There is no mass effect or midline shift.    There is no extracerebral collection.    Ventricles are normal in size and configuration for patient's stated age.     Posterior fossa is within normal limits.    Calvarium and skull base appear intact.  Visualized sinuses show no air fluid levels. Visualized orbits are unremarkable.      Impression:      Impression:  Brain atrophy with chronic microvascular ischemic changes    No evidence of acute intracranial abnormality    Limited exam due to motion artifact      Electronically Signed: Carl Chaves MD    4/3/2024 3:29 PM EDT    Workstation ID: [id number]    XR Chest 1 View [[id number]] Collected: 04/03/24 1444     Updated: 04/03/24 1448    Narrative:      XR CHEST 1 VW    Date of Exam: 4/3/2024 2:27 PM EDT    Indication: ams    Comparison: 11/30/2022.    Findings:  Heart and pulmonary vessels appear within normal limits. Lung fields are well inflated and clear of acute infiltrates or effusions. There is no pneumothorax.      Impression:      Impression:  No acute process.      Electronically Signed: Clara Byrne MD    4/3/2024 2:46 PM EDT    Workstation ID: [id number]              Assessment & Plan        This is a 64 y.o. female with:    Active and Resolved Problems  There are no hospital problems to display for this patient.    #Hepatic encephalopathy with altered mental status  #Alcoholic liver disease  -Daily drinker-elevated ammonia  - Elevated LFTs  - GI team has been consulted patient discriminant score 36 will be started on steroids  IV fluid and oral lactulose  - Monitor for withdrawal zinc multivitamin thiamine and empirically Protonix. Xifaxan  was also added  -Dobbhoff placement given poor nutritional status  Will place patient on CIWA protocol    #JAZLYN likely from volume depletion   #anion gap metabolic acidosis  #Lactic acidosis  -Will give IV fluid hydration  - Monitor renal function  -Will  avoid nephrotoxic medications    #UTI with cystitis  #-Leukocytosis with lactic acidosis  -Post-follow-up urine culture results  - Follow-up blood culture results  - Started on ceftriaxone with await culture result    #Anemia-likely from EtOH  - Hemoglobin 7; will monitor every 6 transfuse to keep hemoglobin above 7 g/dL    # Pancytopenia likely from alcohol intake  -Continue to monitor  - Nutrition team consult    Social work team to follow patient living condition; concern reported by daughter  APS has been informed     DVT prophylaxis:  No DVT prophylaxis order currently exists.        The patient desires to be as follows:    CODE STATUS:           DANETTE STEELE (Daughter)  [phone] (Mobile) ,  is the designated person to make medical decisions on the patient's behalf if She is incapable of doing so. This was clarified with patient and/or next of kin on 4/3/2024 during the course of this H&P.    Admission Status:  I believe this patient meets inpt  status.    Expected Length of Stay: More than 2 midnights    PDMP and Medication Dispenses via Sidebar reviewed and consistent with patient reported medications.    I discussed the patient's findings and my recommendations with patient and family.      Signature:     This document has been electronically signed by Prosper Dawson MD on April 3, 2024 16:43 EDT   [facility] Hospitalist Team     Electronically signed by Prosper Dawson MD at 24 1927          Physician Progress Notes (last 24 hours)        Nakul Khan MD at 24 1337              [facility] MEDICINE SERVICE  DAILY PROGRESS NOTE    NAME: Nelda Alcocer  : 1960  MRN: [id number]      LOS: 3 days     PROVIDER OF SERVICE: Nakul Khan MD    Chief Complaint: Hepatic encephalopathy    Subjective:         Subjective       Chief Complaint: worsening confusion      History of Present Illness: Nelda Alcocer is a 64 y.o. female with a CMH of multiple sclerosis, alcohol  use with liver cirrhosis and tobacco abuse DVT lower extremity presented with poor oral intake and progressively worsening confusion.  Patient lives with her  and consents alcohol daily.  Per report patient has not removed from her chair in the last 3 days  was concerned and called EMS     In the ER patient was tachycardic in the range of 125 blood pressure 117/60 and saturating 99% on room air lab was significant for mildly elevated troponin of 19 which subsequently was 18; chloride of 108, bicarb of 16 and anion gap of 17.  Creatinine was elevated 1.12 with elevated BUN/creatinine ratio.  Alkaline phosphatase was 167 and AST was also elevated.  Total bilirubin was 10.8.  Lactic acid was 3 subsequently 3.4.  PT and INR were elevated 17.6 and 1.68 white cell count was 15.2 with neutrophil predominance.  Hemoglobin noted for 7.1 as compared to 12 with anemia  Microcytic features urinalysis was suggestive of infection with nitrate positive and WBC of 6-10 per microscopic field     Chest x-ray showed no acute process; CT of the head without contrast brain atrophy with microvascular ischemic changes and no other significant acute intracranial abnormality.  CT abdomen pelvis contrast showed liver cirrhosis with patchy heterogeneous liver parenchyma suggestive of hepatocellular disease or multicentric liver lesions recommended MRI.  Mild amount of ascites was stable mildly enlarged periportal and retroperitoneal lymph nodes.  Cholelithiasis was also noted     Interval History:    4/4/24-Patient is seen and examined at bedside while still in the ED, she was laying comfortably in bed, jaundiced appearing awake and alert but not oriented.  She is able to answer yes or no to some questions but unable to follow conversations.  Other than hypotension she is stable  4/5/24 patient seen and examined in bed no acute distress, blood pressure stable, heart rate 111.Pt would benefit from SNF for continued PT at  discharge.   4/5/24 patient seen and examined in bed no acute distress, discussed with RN, she is more lethargic today.  Will order ABG and ammonia level.  Family at bedside, long discussion with the daughter regarding advance directive living will.  Will consult palliative.    Objective:     Vital Signs  Temp:  [97.5 °F (36.4 °C)-98.3 °F (36.8 °C)] 97.9 °F (36.6 °C)  Heart Rate:  [100-116] 116  Resp:  [10-18] 14  BP: (125-148)/(76-87) 133/87  Flow (L/min):  [5] 5   Body mass index is 16.83 kg/m².    Physical Exam  Appearance: No apparent distress, non-toxic appearing   HEENT/Neck: Neck is supple, Extraocular movements intact,   Cardiovascular: Regular Rate and Rhythm, No murmurs, gallops or rubs   Pulmonary: Clear to auscultation Bilaterally.   Abdomen: BS+, Soft, non-tender,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 = Recorded By, (t) = Taken By, (c) = Cosigned By      Initials Name Provider Type     Radha Smith OT Occupational Therapist                  Therapy Charges for Today        Code Description Service Date Service Provider Modifiers Qty    79349860935 HC OT SELF CARE/MGMT/TRAIN EA 15 MIN 4/4/2024 Radha Smith OT GO 1    23976253492 HC OT EVAL HIGH COMPLEXITY 3 4/4/2024 Radha Smith OT GO 1                 Radha Smith OT  4/4/2024    Electronically signed by Radha Smith OT at 04/04/24 1018

## 2024-04-06 NOTE — PROGRESS NOTES
[facility] MEDICINE SERVICE  DAILY PROGRESS NOTE    NAME: Nelda Alcocer  : 1960  MRN: [id number]      LOS: 3 days     PROVIDER OF SERVICE: Nakul Khan MD    Chief Complaint: Hepatic encephalopathy    Subjective:         Subjective       Chief Complaint: worsening confusion      History of Present Illness: Nelda Alcocer is a 64 y.o. female with a CMH of multiple sclerosis, alcohol use with liver cirrhosis and tobacco abuse DVT lower extremity presented with poor oral intake and progressively worsening confusion.  Patient lives with her  and consents alcohol daily.  Per report patient has not removed from her chair in the last 3 days  was concerned and called EMS     In the ER patient was tachycardic in the range of 125 blood pressure 117/60 and saturating 99% on room air lab was significant for mildly elevated troponin of 19 which subsequently was 18; chloride of 108, bicarb of 16 and anion gap of 17.  Creatinine was elevated 1.12 with elevated BUN/creatinine ratio.  Alkaline phosphatase was 167 and AST was also elevated.  Total bilirubin was 10.8.  Lactic acid was 3 subsequently 3.4.  PT and INR were elevated 17.6 and 1.68 white cell count was 15.2 with neutrophil predominance.  Hemoglobin noted for 7.1 as compared to 12 with anemia  Microcytic features urinalysis was suggestive of infection with nitrate positive and WBC of 6-10 per microscopic field     Chest x-ray showed no acute process; CT of the head without contrast brain atrophy with microvascular ischemic changes and no other significant acute intracranial abnormality.  CT abdomen pelvis contrast showed liver cirrhosis with patchy heterogeneous liver parenchyma suggestive of hepatocellular disease or multicentric liver lesions recommended MRI.  Mild amount of ascites was stable mildly enlarged periportal and retroperitoneal lymph nodes.  Cholelithiasis was also noted     Interval History:    24-Patient is  seen and examined at bedside while still in the ED, she was laying comfortably in bed, jaundiced appearing awake and alert but not oriented.  She is able to answer yes or no to some questions but unable to follow conversations.  Other than hypotension she is stable  4/5/24 patient seen and examined in bed no acute distress, blood pressure stable, heart rate 111.Pt would benefit from SNF for continued PT at discharge.   4/6/24 patient seen and examined in bed no acute distress, discussed with RN, she is more lethargic today.  Will order ABG and ammonia level.  Family at bedside, long discussion with the daughter regarding advance directive living will.  Will consult palliative.    Objective:     Vital Signs  Temp:  [97.5 °F (36.4 °C)-98.3 °F (36.8 °C)] 97.9 °F (36.6 °C)  Heart Rate:  [100-116] 116  Resp:  [10-18] 14  BP: (125-148)/(76-87) 133/87  Flow (L/min):  [5] 5   Body mass index is 16.83 kg/m².    Physical Exam  Appearance: No apparent distress, non-toxic appearing   HEENT/Neck: Neck is supple, Extraocular movements intact,   Cardiovascular: Regular Rate and Rhythm, No murmurs, gallops or rubs   Pulmonary: Clear to auscultation Bilaterally.   Abdomen: BS+, Soft, non-tender, non-distended.   Ext: No Cyanosis, Clubbing, Edema.  Neuro: Non-focal, Alert & awake, confused     Scheduled Meds   aluminum sulfate-calcium acetate 1:20, 1,000 mL, Topical, Q8H  cefTRIAXone, 1,000 mg, Intravenous, Q24H  ferrous sulfate, 324 mg, Oral, Daily With Breakfast  folic acid, 1 mg, Oral, Daily  furosemide, 20 mg, Oral, Daily  lactulose, 10 g, Oral, BID  multivitamin, 1 tablet, Oral, Daily  pantoprazole, 40 mg, Oral, BID AC  prednisoLONE sodium phosphate, 40 mg, Oral, Daily  rifAXIMin, 550 mg, Oral, Q12H  sodium chloride, 10 mL, Intravenous, Q12H  spironolactone, 12.5 mg, Oral, Daily  thiamine (B-1) IV, 500 mg, Intravenous, Q8H   Followed by  thiamine (B-1) IV, 200 mg, Intravenous, Q8H   Followed by  [START ON 4/11/2024] thiamine,  100 mg, Oral, Daily  zinc sulfate, 220 mg, Oral, Daily       PRN Meds     guaifenesin    LORazepam **OR** midazolam **OR** LORazepam **OR** midazolam **OR** midazolam **OR** midazolam    Magnesium Standard Dose Replacement - Follow Nurse / BPA Driven Protocol    ondansetron ODT **OR** ondansetron    [COMPLETED] Insert Peripheral IV **AND** sodium chloride    sodium chloride    sodium chloride   Infusions  lactated ringers, 100 mL/hr, Last Rate: 100 mL/hr (04/05/24 0530)          Diagnostic Data    Results from last 7 days   Lab Units 04/06/24  0512   WBC 10*3/mm3 13.78*   HEMOGLOBIN g/dL 8.8*   HEMATOCRIT % 28.4*   PLATELETS 10*3/mm3 107*   GLUCOSE mg/dL 97   CREATININE mg/dL 1.09*   BUN mg/dL 33*   SODIUM mmol/L 139   POTASSIUM mmol/L 4.6   AST (SGOT) U/L 80*   ALT (SGPT) U/L 31   ALK PHOS U/L 133*   BILIRUBIN mg/dL 6.1*   ANION GAP mmol/L 13.0       No radiology results for the last day      I reviewed the patient's new clinical results.    Assessment/Plan:     Active and Resolved Problems  Active Hospital Problems    Diagnosis  POA    **Hepatic encephalopathy [K76.82]  Yes    Severe malnutrition [E43]  Yes    Altered mental status, unspecified altered mental status type [R41.82]  Yes      Resolved Hospital Problems   No resolved problems to display.     Hepatic encephalopathy with altered mental status  Alcoholic liver disease  Alcohol withdrawal  -Patient presented with hepatic encephalopathy in the settings of cirrhosis.  -She had a discriminant factor of 36 on presentation and she was started on prednisolone.  -Gastroenterology following, appreciate recommendations.  -Continue lactulose and titrate for 3-4 bowel movements in a day.  -Hold off NGT placement as family is able to feed patient.  -will monitor patient for signs of withdrawal, try to limit benzodiazepine use.      Acute kidney injury  High anion gap metabolic acidosis  Lactic acidosis  Hypothyroidism  -She was placed on maintenance volume  resuscitation, will give 1 L bolus and recheck blood pressure.  -Kidney function mildly improved, anion gap closed, still has a bicarbonate of 17.  -Trend lactic acid until clearance.    UTI with cystitis  Continue ceftriaxone, no growth on urine cultures so far.     Anemia  -She is status post 1 unit packed red blood cell transfusion for hemoglobin of 6.1.  Repeat hemoglobin pending, transfuse for hemoglobin less than 7.  -Platelet 102, continue to monitor.  -Check iron, folate and B12 level.     DVT prophylaxis:SCD  Full code-care planning, greater than 30 minutes    Continue inpatient level of care.  Plan discussed with patient and nurse  Pt would benefit from SNF for continued PT at discharge.     Signature: Electronically signed by Nakul Khan MD, 04/06/24, 13:37 EDT.  [facility] Hospitalist Team

## 2024-04-07 ENCOUNTER — INPATIENT HOSPITAL (OUTPATIENT)
Dept: URBAN - METROPOLITAN AREA HOSPITAL 84 | Facility: HOSPITAL | Age: 64
End: 2024-04-07
Payer: MEDICARE

## 2024-04-07 ENCOUNTER — APPOINTMENT (OUTPATIENT)
Dept: CT IMAGING | Facility: HOSPITAL | Age: 64
End: 2024-04-07
Payer: MEDICARE

## 2024-04-07 ENCOUNTER — APPOINTMENT (OUTPATIENT)
Dept: GENERAL RADIOLOGY | Facility: HOSPITAL | Age: 64
End: 2024-04-07
Payer: MEDICARE

## 2024-04-07 DIAGNOSIS — K70.30 ALCOHOLIC CIRRHOSIS OF LIVER WITHOUT ASCITES: ICD-10-CM

## 2024-04-07 LAB
ALBUMIN SERPL-MCNC: 2.9 G/DL (ref 3.5–5.2)
ALBUMIN/GLOB SERPL: 0.9 G/DL
ALP SERPL-CCNC: 160 U/L (ref 39–117)
ALT SERPL W P-5'-P-CCNC: 38 U/L (ref 1–33)
ANION GAP SERPL CALCULATED.3IONS-SCNC: 11 MMOL/L (ref 5–15)
AST SERPL-CCNC: 88 U/L (ref 1–32)
BASOPHILS # BLD AUTO: 0.04 10*3/MM3 (ref 0–0.2)
BASOPHILS NFR BLD AUTO: 0.3 % (ref 0–1.5)
BILIRUB SERPL-MCNC: 6 MG/DL (ref 0–1.2)
BUN SERPL-MCNC: 36 MG/DL (ref 8–23)
BUN/CREAT SERPL: 35.3 (ref 7–25)
CALCIUM SPEC-SCNC: 9.4 MG/DL (ref 8.6–10.5)
CHLORIDE SERPL-SCNC: 110 MMOL/L (ref 98–107)
CO2 SERPL-SCNC: 18 MMOL/L (ref 22–29)
CREAT SERPL-MCNC: 1.02 MG/DL (ref 0.57–1)
DEPRECATED RDW RBC AUTO: 95.4 FL (ref 37–54)
EGFRCR SERPLBLD CKD-EPI 2021: 62.3 ML/MIN/1.73
EOSINOPHIL # BLD AUTO: 0.01 10*3/MM3 (ref 0–0.4)
EOSINOPHIL NFR BLD AUTO: 0.1 % (ref 0.3–6.2)
ERYTHROCYTE [DISTWIDTH] IN BLOOD BY AUTOMATED COUNT: 24.6 % (ref 12.3–15.4)
GLOBULIN UR ELPH-MCNC: 3.4 GM/DL
GLUCOSE SERPL-MCNC: 128 MG/DL (ref 65–99)
HCT VFR BLD AUTO: 28.6 % (ref 34–46.6)
HGB BLD-MCNC: 9 G/DL (ref 12–15.9)
IMM GRANULOCYTES # BLD AUTO: 0.24 10*3/MM3 (ref 0–0.05)
IMM GRANULOCYTES NFR BLD AUTO: 1.6 % (ref 0–0.5)
LYMPHOCYTES # BLD AUTO: 0.82 10*3/MM3 (ref 0.7–3.1)
LYMPHOCYTES NFR BLD AUTO: 5.4 % (ref 19.6–45.3)
MAGNESIUM SERPL-MCNC: 1.7 MG/DL (ref 1.6–2.4)
MCH RBC QN AUTO: 33.6 PG (ref 26.6–33)
MCHC RBC AUTO-ENTMCNC: 31.5 G/DL (ref 31.5–35.7)
MCV RBC AUTO: 106.7 FL (ref 79–97)
MONOCYTES # BLD AUTO: 2.58 10*3/MM3 (ref 0.1–0.9)
MONOCYTES NFR BLD AUTO: 17 % (ref 5–12)
NEUTROPHILS NFR BLD AUTO: 11.5 10*3/MM3 (ref 1.7–7)
NEUTROPHILS NFR BLD AUTO: 75.6 % (ref 42.7–76)
NRBC BLD AUTO-RTO: 0 /100 WBC (ref 0–0.2)
PHOSPHATE SERPL-MCNC: 3.5 MG/DL (ref 2.5–4.5)
PLATELET # BLD AUTO: 142 10*3/MM3 (ref 140–450)
PMV BLD AUTO: 11 FL (ref 6–12)
POTASSIUM SERPL-SCNC: 4.5 MMOL/L (ref 3.5–5.2)
PROT SERPL-MCNC: 6.3 G/DL (ref 6–8.5)
RBC # BLD AUTO: 2.68 10*6/MM3 (ref 3.77–5.28)
SODIUM SERPL-SCNC: 139 MMOL/L (ref 136–145)
WBC NRBC COR # BLD AUTO: 15.19 10*3/MM3 (ref 3.4–10.8)

## 2024-04-07 PROCEDURE — 80053 COMPREHEN METABOLIC PANEL: CPT | Performed by: STUDENT IN AN ORGANIZED HEALTH CARE EDUCATION/TRAINING PROGRAM

## 2024-04-07 PROCEDURE — 84100 ASSAY OF PHOSPHORUS: CPT

## 2024-04-07 PROCEDURE — 74018 RADEX ABDOMEN 1 VIEW: CPT

## 2024-04-07 PROCEDURE — 63710000001 PREDNISOLONE PER 5 MG: Performed by: NURSE PRACTITIONER

## 2024-04-07 PROCEDURE — 85025 COMPLETE CBC W/AUTO DIFF WBC: CPT | Performed by: NURSE PRACTITIONER

## 2024-04-07 PROCEDURE — 83735 ASSAY OF MAGNESIUM: CPT

## 2024-04-07 PROCEDURE — 25010000002 THIAMINE PER 100 MG: Performed by: STUDENT IN AN ORGANIZED HEALTH CARE EDUCATION/TRAINING PROGRAM

## 2024-04-07 PROCEDURE — 99232 SBSQ HOSP IP/OBS MODERATE 35: CPT | Performed by: NURSE PRACTITIONER

## 2024-04-07 PROCEDURE — 25010000002 LORAZEPAM PER 2 MG: Performed by: INTERNAL MEDICINE

## 2024-04-07 PROCEDURE — 74176 CT ABD & PELVIS W/O CONTRAST: CPT

## 2024-04-07 PROCEDURE — 25010000002 CEFTRIAXONE PER 250 MG: Performed by: NURSE PRACTITIONER

## 2024-04-07 RX ORDER — ALBUMIN (HUMAN) 12.5 G/50ML
37.5 SOLUTION INTRAVENOUS ONCE
Qty: 200 ML | Refills: 0 | Status: DISCONTINUED | OUTPATIENT
Start: 2024-04-08 | End: 2024-04-09

## 2024-04-07 RX ORDER — ALBUMIN (HUMAN) 12.5 G/50ML
50 SOLUTION INTRAVENOUS ONCE
Qty: 200 ML | Refills: 0 | Status: DISCONTINUED | OUTPATIENT
Start: 2024-04-08 | End: 2024-04-09

## 2024-04-07 RX ORDER — ALBUMIN (HUMAN) 12.5 G/50ML
62.5 SOLUTION INTRAVENOUS ONCE
Qty: 300 ML | Refills: 0 | Status: DISCONTINUED | OUTPATIENT
Start: 2024-04-08 | End: 2024-04-09

## 2024-04-07 RX ORDER — ALBUMIN (HUMAN) 12.5 G/50ML
112.5 SOLUTION INTRAVENOUS ONCE
Qty: 450 ML | Refills: 0 | Status: DISCONTINUED | OUTPATIENT
Start: 2024-04-08 | End: 2024-04-09

## 2024-04-07 RX ORDER — ALBUMIN (HUMAN) 12.5 G/50ML
87.5 SOLUTION INTRAVENOUS ONCE
Qty: 400 ML | Refills: 0 | Status: DISCONTINUED | OUTPATIENT
Start: 2024-04-08 | End: 2024-04-09

## 2024-04-07 RX ORDER — ALBUMIN (HUMAN) 12.5 G/50ML
100 SOLUTION INTRAVENOUS ONCE
Qty: 400 ML | Refills: 0 | Status: DISCONTINUED | OUTPATIENT
Start: 2024-04-08 | End: 2024-04-09

## 2024-04-07 RX ORDER — ALBUMIN (HUMAN) 12.5 G/50ML
75 SOLUTION INTRAVENOUS ONCE
Qty: 300 ML | Refills: 0 | Status: DISCONTINUED | OUTPATIENT
Start: 2024-04-08 | End: 2024-04-09

## 2024-04-07 RX ORDER — LORAZEPAM 2 MG/ML
0.5 INJECTION INTRAMUSCULAR ONCE
Status: COMPLETED | OUTPATIENT
Start: 2024-04-07 | End: 2024-04-07

## 2024-04-07 RX ADMIN — THIAMINE HYDROCHLORIDE 200 MG: 100 INJECTION, SOLUTION INTRAMUSCULAR; INTRAVENOUS at 13:55

## 2024-04-07 RX ADMIN — THERA TABS 1 TABLET: TAB at 08:29

## 2024-04-07 RX ADMIN — LORAZEPAM 0.5 MG: 2 INJECTION INTRAMUSCULAR; INTRAVENOUS at 16:54

## 2024-04-07 RX ADMIN — FUROSEMIDE 20 MG: 20 TABLET ORAL at 08:29

## 2024-04-07 RX ADMIN — Medication 10 ML: at 09:54

## 2024-04-07 RX ADMIN — THIAMINE HYDROCHLORIDE 200 MG: 100 INJECTION, SOLUTION INTRAMUSCULAR; INTRAVENOUS at 23:28

## 2024-04-07 RX ADMIN — THIAMINE HYDROCHLORIDE 200 MG: 100 INJECTION, SOLUTION INTRAMUSCULAR; INTRAVENOUS at 05:05

## 2024-04-07 RX ADMIN — PREDNISOLONE SODIUM PHOSPHATE 40 MG: 15 SOLUTION ORAL at 08:29

## 2024-04-07 RX ADMIN — PANTOPRAZOLE SODIUM 40 MG: 40 TABLET, DELAYED RELEASE ORAL at 08:29

## 2024-04-07 RX ADMIN — Medication 1000 ML: at 22:00

## 2024-04-07 RX ADMIN — Medication 1000 ML: at 14:02

## 2024-04-07 RX ADMIN — SPIRONOLACTONE 12.5 MG: 25 TABLET ORAL at 08:41

## 2024-04-07 RX ADMIN — FERROUS SULFATE TAB EC 324 MG (65 MG FE EQUIVALENT) 324 MG: 324 (65 FE) TABLET DELAYED RESPONSE at 08:29

## 2024-04-07 RX ADMIN — LACTULOSE 20 G: 20 SOLUTION ORAL at 08:29

## 2024-04-07 RX ADMIN — RIFAXIMIN 550 MG: 550 TABLET ORAL at 08:41

## 2024-04-07 RX ADMIN — Medication 1000 ML: at 08:41

## 2024-04-07 RX ADMIN — CEFTRIAXONE 1000 MG: 1 INJECTION, POWDER, FOR SOLUTION INTRAMUSCULAR; INTRAVENOUS at 13:55

## 2024-04-07 RX ADMIN — FOLIC ACID 1 MG: 1 TABLET ORAL at 08:29

## 2024-04-07 RX ADMIN — Medication 220 MG: at 08:41

## 2024-04-07 NOTE — PROGRESS NOTES
"Nutrition Services    Patient Name: Nelda Alcocer  YOB: 1960  MRN: [id number]  Admission date: 4/3/2024    PROGRESS NOTE      Encounter Information: Check on for tube feeding.  Plans for paracentesis 4/8.         PO Diet: Diet: Cardiac; Low Sodium (2g); Fluid Consistency: Thin (IDDSI 0)   PO Supplements: None ordered    PO Intake:  0% x 5 documented meals since admission        Current nutrition support: Impact Peptide 1.5 at 10 mL/hr + 10 mL/hr water flush    Nutrition support review: Documented as above per EMR        Labs (reviewed below): Reviewed, management per attending         GI Function:  Last documented BM 4/6 (yesterday)       Nutrition Intervention Updates: Gradual advancement in tube feeding Impact Peptide 1.5 at 30 mL/hr + 10 mL/hr water flush     Ordered Mag/Phos for 4/8 AM lab draw       Results from last 7 days   Lab Units 04/07/24  0505 04/06/24  0512 04/05/24  0024   SODIUM mmol/L 139 139 139   POTASSIUM mmol/L 4.5 4.6 4.0   CHLORIDE mmol/L 110* 110* 111*   CO2 mmol/L 18.0* 16.0* 17.0*   BUN mg/dL 36* 33* 29*   CREATININE mg/dL 1.02* 1.09* 0.94   CALCIUM mg/dL 9.4 9.2 8.5*   BILIRUBIN mg/dL 6.0* 6.1* 6.4*   ALK PHOS U/L 160* 133* 156*   ALT (SGPT) U/L 38* 31 25   AST (SGOT) U/L 88* 80* 52*   GLUCOSE mg/dL 128* 97 106*     Results from last 7 days   Lab Units 04/07/24  0505 04/04/24  0214 04/04/24  0054 04/03/24  1359   MAGNESIUM mg/dL 1.7  --  1.9 2.1   PHOSPHORUS mg/dL 3.5  --   --   --    HEMOGLOBIN g/dL 9.0*   < >  --  7.1*   HEMATOCRIT % 28.6*   < >  --  23.6*    < > = values in this interval not displayed.     COVID19   Date Value Ref Range Status   04/03/2024 Not Detected Not Detected - Ref. Range Final     No results found for: \"HGBA1C\"      RD to follow up per protocol.    Electronically signed by:  Grisel Santiago RD  04/07/24 10:47 EDT    "

## 2024-04-07 NOTE — PROGRESS NOTES
[facility] MEDICINE SERVICE  DAILY PROGRESS NOTE    NAME: Nelda Alcocer  : 1960  MRN: [id number]      LOS: 4 days     PROVIDER OF SERVICE: Nakul Khan MD    Chief Complaint: Hepatic encephalopathy    Subjective:         Subjective       Chief Complaint: worsening confusion      History of Present Illness: Nelda Alcocer is a 64 y.o. female with a CMH of multiple sclerosis, alcohol use with liver cirrhosis and tobacco abuse DVT lower extremity presented with poor oral intake and progressively worsening confusion.  Patient lives with her  and consents alcohol daily.  Per report patient has not removed from her chair in the last 3 days  was concerned and called EMS     In the ER patient was tachycardic in the range of 125 blood pressure 117/60 and saturating 99% on room air lab was significant for mildly elevated troponin of 19 which subsequently was 18; chloride of 108, bicarb of 16 and anion gap of 17.  Creatinine was elevated 1.12 with elevated BUN/creatinine ratio.  Alkaline phosphatase was 167 and AST was also elevated.  Total bilirubin was 10.8.  Lactic acid was 3 subsequently 3.4.  PT and INR were elevated 17.6 and 1.68 white cell count was 15.2 with neutrophil predominance.  Hemoglobin noted for 7.1 as compared to 12 with anemia  Microcytic features urinalysis was suggestive of infection with nitrate positive and WBC of 6-10 per microscopic field     Chest x-ray showed no acute process; CT of the head without contrast brain atrophy with microvascular ischemic changes and no other significant acute intracranial abnormality.  CT abdomen pelvis contrast showed liver cirrhosis with patchy heterogeneous liver parenchyma suggestive of hepatocellular disease or multicentric liver lesions recommended MRI.  Mild amount of ascites was stable mildly enlarged periportal and retroperitoneal lymph nodes.  Cholelithiasis was also noted     Interval History:    24-Patient is  seen and examined at bedside while still in the ED, she was laying comfortably in bed, jaundiced appearing awake and alert but not oriented.  She is able to answer yes or no to some questions but unable to follow conversations.  Other than hypotension she is stable  4/5/24 patient seen and examined in bed no acute distress, blood pressure stable, heart rate 111.Pt would benefit from SNF for continued PT at discharge.   4/6/24 patient seen and examined in bed no acute distress, discussed with RN, she is more lethargic today.  Will order ABG and ammonia level.  Family at bedside, long discussion with the daughter regarding advance directive living will.  Will consult palliative.  4/7/24 patient seen and examined in bed no acute distress vital signs blood pressure stable, heart rate 116.  She is more responsive today, discussed with RN.    Objective:     Vital Signs  Temp:  [97.2 °F (36.2 °C)-98 °F (36.7 °C)] 97.8 °F (36.6 °C)  Heart Rate:  [103-116] 116  Resp:  [11-17] 15  BP: (128-155)/(70-84) 138/77  Flow (L/min):  [3] 3   Body mass index is 19.84 kg/m².    Physical Exam  Appearance: No apparent distress, non-toxic appearing   HEENT/Neck: Neck is supple, Extraocular movements intact,   Cardiovascular: Regular Rate and Rhythm, No murmurs, gallops or rubs   Pulmonary: Clear to auscultation Bilaterally.   Abdomen: BS+, Soft, non-tender, non-distended.   Ext: No Cyanosis, Clubbing, Edema.  Neuro: Non-focal, Alert & awake, confused     Scheduled Meds   aluminum sulfate-calcium acetate 1:20, 1,000 mL, Topical, Q8H  cefTRIAXone, 1,000 mg, Intravenous, Q24H  ferrous sulfate, 324 mg, Oral, Daily With Breakfast  folic acid, 1 mg, Oral, Daily  furosemide, 20 mg, Oral, Daily  lactulose, 20 g, Oral, BID  multivitamin, 1 tablet, Oral, Daily  pantoprazole, 40 mg, Oral, BID AC  prednisoLONE sodium phosphate, 40 mg, Oral, Daily  rifAXIMin, 550 mg, Oral, Q12H  sodium chloride, 10 mL, Intravenous, Q12H  spironolactone, 12.5 mg, Oral,  Daily  thiamine (B-1) IV, 200 mg, Intravenous, Q8H   Followed by  [START ON 4/11/2024] thiamine, 100 mg, Oral, Daily  zinc sulfate, 220 mg, Oral, Daily       PRN Meds     guaifenesin    LORazepam **OR** midazolam **OR** LORazepam **OR** midazolam **OR** midazolam **OR** midazolam    Magnesium Standard Dose Replacement - Follow Nurse / BPA Driven Protocol    ondansetron ODT **OR** ondansetron    [COMPLETED] Insert Peripheral IV **AND** sodium chloride    sodium chloride    sodium chloride   Infusions  lactated ringers, 100 mL/hr, Last Rate: 100 mL/hr (04/05/24 0530)          Diagnostic Data    Results from last 7 days   Lab Units 04/07/24  0505   WBC 10*3/mm3 15.19*   HEMOGLOBIN g/dL 9.0*   HEMATOCRIT % 28.6*   PLATELETS 10*3/mm3 142   GLUCOSE mg/dL 128*   CREATININE mg/dL 1.02*   BUN mg/dL 36*   SODIUM mmol/L 139   POTASSIUM mmol/L 4.5   AST (SGOT) U/L 88*   ALT (SGPT) U/L 38*   ALK PHOS U/L 160*   BILIRUBIN mg/dL 6.0*   ANION GAP mmol/L 11.0       XR Abdomen KUB    Result Date: 4/6/2024  Impression: Enteric tube terminates in the left upper quadrant, with tip likely in the mid stomach. Gas distended bowel loops with possible dilatation of central small bowel loops. Correlate for ileus or obstruction. Electronically Signed: Dontae Noguera  4/6/2024 1:45 PM EDT  Workstation ID: [id number]       I reviewed the patient's new clinical results.    Assessment/Plan:     Active and Resolved Problems  Active Hospital Problems    Diagnosis  POA    **Hepatic encephalopathy [K76.82]  Yes    Severe malnutrition [E43]  Yes    Altered mental status, unspecified altered mental status type [R41.82]  Yes      Resolved Hospital Problems   No resolved problems to display.     Hepatic encephalopathy with altered mental status  Alcoholic liver disease  Alcohol withdrawal  -Patient presented with hepatic encephalopathy in the settings of cirrhosis.  -She had a discriminant factor of 36 on presentation and she was started on  prednisolone.  -Gastroenterology following, appreciate recommendations.>recommending Dobbhoff tube feeding. Will monitor liver enzymes. Continue lactulose and Xifaxan. Continue prednisolone zinc and multivitamin. Continue spironolactone and furosemide and monitor creatinine   -Continue lactulose and titrate for 3-4 bowel movements in a day.  -will monitor patient for signs of withdrawal, try to limit benzodiazepine use.      Acute kidney injury  High anion gap metabolic acidosis  Lactic acidosis  Hypothyroidism  -She was placed on maintenance volume resuscitation, will give 1 L bolus and recheck blood pressure.  -Kidney function mildly improved, anion gap closed, still has a bicarbonate of 17.  -Trend lactic acid until clearance.    UTI with cystitis  Continue ceftriaxone, no growth on urine cultures so far.     Anemia  -She is status post 1 unit packed red blood cell transfusion for hemoglobin of 6.1.  Repeat hemoglobin pending, transfuse for hemoglobin less than 7.  -Platelet 102, continue to monitor.  -Check iron, folate and B12 level.     DVT prophylaxis:SCD  Full code-care planning, greater than 30 minutes    Continue inpatient level of care.  Plan discussed with patient and nurse  Pt would benefit from SNF for continued PT at discharge.     Signature: Electronically signed by Nakul Khan MD, 04/07/24, 12:52 EDT.  [facility] Hospitalist Team

## 2024-04-07 NOTE — PROGRESS NOTES
" LOS: 4 days   Patient Care Team:  Kathy Hayes APRN as PCP - General (Nurse Practitioner)      Subjective   \"I have had paracentesis before\"     Interval History:    Labs: Sodium potassium are normal.  Creatinine 1.02.  Total bilirubin 6 down from 6.1, alk phos 160 (133), AST 88 (80), ALT 38 (31).  No ammonia level today.  No INR today.  WBCs 15.19 up from 13.78, hemoglobin stable at 9, platelets 142.  KUB shows enteric tube terminates in the left upper quadrant mid stomach.  Gas distended bowel loops with possible dilation of central small bowel loops.  Correlate with ileus or obstruction.  This appearance is frequently seen when patients are on lactulose.  No concern.  Patient is having stools.  Has pulled out NGT twice.     ROS:    Review of Systems   Respiratory:  Positive for cough.    Gastrointestinal:  Positive for abdominal distention. Negative for abdominal pain and vomiting.   Psychiatric/Behavioral:  Positive for confusion.         Medication Review:   Scheduled Meds:aluminum sulfate-calcium acetate 1:20, 1,000 mL, Topical, Q8H  cefTRIAXone, 1,000 mg, Intravenous, Q24H  ferrous sulfate, 324 mg, Oral, Daily With Breakfast  folic acid, 1 mg, Oral, Daily  furosemide, 20 mg, Oral, Daily  lactulose, 20 g, Oral, BID  multivitamin, 1 tablet, Oral, Daily  pantoprazole, 40 mg, Oral, BID AC  prednisoLONE sodium phosphate, 40 mg, Oral, Daily  rifAXIMin, 550 mg, Oral, Q12H  sodium chloride, 10 mL, Intravenous, Q12H  spironolactone, 12.5 mg, Oral, Daily  thiamine (B-1) IV, 200 mg, Intravenous, Q8H   Followed by  [START ON 4/11/2024] thiamine, 100 mg, Oral, Daily  zinc sulfate, 220 mg, Oral, Daily      Continuous Infusions:lactated ringers, 100 mL/hr, Last Rate: 100 mL/hr (04/05/24 0530)      PRN Meds:.  guaifenesin    LORazepam **OR** midazolam **OR** LORazepam **OR** midazolam **OR** midazolam **OR** midazolam    Magnesium Standard Dose Replacement - Follow Nurse / BPA Driven Protocol    ondansetron ODT **OR** " ondansetron    [COMPLETED] Insert Peripheral IV **AND** sodium chloride    sodium chloride    sodium chloride      Objective thin and frail appearing.  NAD.     Vital Signs  Temp:  [97.2 °F (36.2 °C)-98 °F (36.7 °C)] 98 °F (36.7 °C)  Heart Rate:  [103-116] 116  Resp:  [11-17] 12  BP: (128-155)/(70-84) 155/84    Physical Exam:  General Appearance:   Opens eyes but very drowsy and ill-appearing. Muscle wasting.    Head:    Normocephalic, without obvious abnormality   Eyes:          Conjunctivae normal, icteric sclera   Ears:    Hearing intact   Throat:   No oral lesions, no thrush, oral mucosa moist       Lungs:     Respirations regular, even and unlabored       Abdomen:     Soft, non-tender, no rebound or guarding, moderate distended positive tympany noted   Rectal:     Deferred   Extremities:   No edema, no cyanosis, no redness.  No peripheral edema.   Skin:   No bleeding, bruising, + jaundice   Neurologic:   Sensation intact        Results Review:    CBC  Results from last 7 days   Lab Units 04/07/24  0505 04/06/24  0512 04/05/24  0024 04/04/24  0837 04/04/24  0214 04/03/24  1359   RBC 10*6/mm3 2.68* 2.67* 2.28* 2.33* 1.79* 2.08*   WBC 10*3/mm3 15.19* 13.78* 10.82* 9.46 10.83* 15.52*   HEMOGLOBIN g/dL 9.0* 8.8* 7.5* 7.6* 6.1* 7.1*   PLATELETS 10*3/mm3 142 107* 87* 61* 102* 85*       CMP  Results from last 7 days   Lab Units 04/07/24  0505 04/06/24  1022 04/06/24  0512 04/05/24  0024 04/04/24  0054 04/03/24  1405 04/03/24  1359   SODIUM mmol/L 139  --  139 139 143  --  141   POTASSIUM mmol/L 4.5  --  4.6 4.0 3.9  --  4.8   CHLORIDE mmol/L 110*  --  110* 111* 114*  --  108*   CO2 mmol/L 18.0*  --  16.0* 17.0* 17.0*  --  16.0*   BUN mg/dL 36*  --  33* 29* 31*  --  36*   CREATININE mg/dL 1.02*  --  1.09* 0.94 1.01*  --  1.12*   GLUCOSE mg/dL 128*  --  97 106* 142*  --  119*   ALBUMIN g/dL 2.9*  --  2.8* 2.7* 2.8*  --  3.3*   BILIRUBIN mg/dL 6.0*  --  6.1* 6.4* 9.4*  --  10.8*   ALK PHOS U/L 160*  --  133* 156* 166*  --   167*   AST (SGOT) U/L 88*  --  80* 52* 60*  --  79*   ALT (SGPT) U/L 38*  --  31 25 26  --  31   AMMONIA umol/L  --  54*  --  41  --  131*  --        Amylase and Lipase  Results from last 7 days   Lab Units 04/03/24  1359   LIPASE U/L 149*       CRP         Imaging Results (Last 24 Hours)       Procedure Component Value Units Date/Time    XR Abdomen KUB [[id number]] Collected: 04/06/24 1342     Updated: 04/06/24 1347    Narrative:        XR ABDOMEN KUB    Date of Exam: 4/6/2024 1:39 PM EDT    Indication: tube feed placement varification    Comparison: April 3, 2024    Findings:  Enteric tube terminates in the left upper quadrant, with tip likely in the mid stomach.    There is gaseous distention of bowel loops. There is possible dilatation of central small bowel loops. There is some suspected gas in the colon as well.      Impression:      Impression:  Enteric tube terminates in the left upper quadrant, with tip likely in the mid stomach.    Gas distended bowel loops with possible dilatation of central small bowel loops. Correlate for ileus or obstruction.    Electronically Signed: Dontae Noguera    4/6/2024 1:45 PM EDT    Workstation ID: [id number]              Assessment & Plan   64-year-old female with alcohol cirrhosis and continued alcohol use presented 4/3/2024 with decreasing mental status and increasing weakness and inability to care for herself at home.     Alcohol hepatitis/cirrhosis  Cirrhosis previously complicated by ascites and nonbleeding esophageal varices  Abnormal MRI liver  Hepatic encephalopathy  Macrocytic anemia  Leukocytosis  UTI  Cholelithiasis  COPD  MS  History of DVT  Alcohol abuse       Plan:  Reji discriminant function 23.9, MELD NA 18  Has pulled out DHT two times. Daughter is amendable to have it replaced.   Ok to continue to eat for pleasure.   Hemoglobin up to 9. LFTs are about the same.   Not a candidate for IV iron, continue PO.   Continue lactulose, continue Xifaxan, PPI,  prednisolone, zinc and vitamins.  Low-dose diuretics restarted yesterday and creatinine is up to 1.09.  Will monitor.    Complete alcohol cessation.  Continue Rocephin.  Plan paracentesis tomorrow.       Tish Jiang, APRN  04/07/24  10:46 EDT

## 2024-04-07 NOTE — NURSING NOTE
Pt resting overnight. Abd still extremely distended, with plan for paracentesis 4/8 per family. MD Khan noted consult to palliative but no order in, MD notified. Pt nutrition poor, not eating dinner or anything overnight-only minimal water/milk intake- TF cont at 10ml hr still infusing. Complaints of being exhausted and tired and just wanting to sleep.

## 2024-04-08 ENCOUNTER — INPATIENT HOSPITAL (OUTPATIENT)
Dept: URBAN - METROPOLITAN AREA HOSPITAL 84 | Facility: HOSPITAL | Age: 64
End: 2024-04-08
Payer: MEDICARE

## 2024-04-08 DIAGNOSIS — K76.82 HEPATIC ENCEPHALOPATHY: ICD-10-CM

## 2024-04-08 DIAGNOSIS — D72.829 ELEVATED WHITE BLOOD CELL COUNT, UNSPECIFIED: ICD-10-CM

## 2024-04-08 DIAGNOSIS — F10.10 ALCOHOL ABUSE, UNCOMPLICATED: ICD-10-CM

## 2024-04-08 DIAGNOSIS — K70.31 ALCOHOLIC CIRRHOSIS OF LIVER WITH ASCITES: ICD-10-CM

## 2024-04-08 DIAGNOSIS — D53.9 NUTRITIONAL ANEMIA, UNSPECIFIED: ICD-10-CM

## 2024-04-08 DIAGNOSIS — K70.11 ALCOHOLIC HEPATITIS WITH ASCITES: ICD-10-CM

## 2024-04-08 DIAGNOSIS — R93.2 ABNORMAL FINDINGS ON DIAGNOSTIC IMAGING OF LIVER AND BILIARY: ICD-10-CM

## 2024-04-08 DIAGNOSIS — I85.10 SECONDARY ESOPHAGEAL VARICES WITHOUT BLEEDING: ICD-10-CM

## 2024-04-08 LAB
ALBUMIN SERPL-MCNC: 2.6 G/DL (ref 3.5–5.2)
ALBUMIN/GLOB SERPL: 0.8 G/DL
ALP SERPL-CCNC: 132 U/L (ref 39–117)
ALT SERPL W P-5'-P-CCNC: 45 U/L (ref 1–33)
ANION GAP SERPL CALCULATED.3IONS-SCNC: 9 MMOL/L (ref 5–15)
AST SERPL-CCNC: 88 U/L (ref 1–32)
BACTERIA SPEC AEROBE CULT: NORMAL
BACTERIA SPEC AEROBE CULT: NORMAL
BILIRUB SERPL-MCNC: 5.3 MG/DL (ref 0–1.2)
BUN SERPL-MCNC: 42 MG/DL (ref 8–23)
BUN/CREAT SERPL: 41.6 (ref 7–25)
CALCIUM SPEC-SCNC: 9.5 MG/DL (ref 8.6–10.5)
CHLORIDE SERPL-SCNC: 112 MMOL/L (ref 98–107)
CO2 SERPL-SCNC: 19 MMOL/L (ref 22–29)
CREAT SERPL-MCNC: 1.01 MG/DL (ref 0.57–1)
EGFRCR SERPLBLD CKD-EPI 2021: 63.1 ML/MIN/1.73
GLOBULIN UR ELPH-MCNC: 3.3 GM/DL
GLUCOSE BLDC GLUCOMTR-MCNC: 115 MG/DL (ref 70–105)
GLUCOSE SERPL-MCNC: 122 MG/DL (ref 65–99)
MAGNESIUM SERPL-MCNC: 1.8 MG/DL (ref 1.6–2.4)
PHOSPHATE SERPL-MCNC: 3.8 MG/DL (ref 2.5–4.5)
POTASSIUM SERPL-SCNC: 5 MMOL/L (ref 3.5–5.2)
PROT SERPL-MCNC: 5.9 G/DL (ref 6–8.5)
SODIUM SERPL-SCNC: 140 MMOL/L (ref 136–145)

## 2024-04-08 PROCEDURE — 25010000002 THIAMINE HCL 200 MG/2ML SOLUTION: Performed by: STUDENT IN AN ORGANIZED HEALTH CARE EDUCATION/TRAINING PROGRAM

## 2024-04-08 PROCEDURE — 25010000002 THIAMINE PER 100 MG: Performed by: INTERNAL MEDICINE

## 2024-04-08 PROCEDURE — 92610 EVALUATE SWALLOWING FUNCTION: CPT

## 2024-04-08 PROCEDURE — 84100 ASSAY OF PHOSPHORUS: CPT

## 2024-04-08 PROCEDURE — 25010000002 THIAMINE HCL 200 MG/2ML SOLUTION: Performed by: INTERNAL MEDICINE

## 2024-04-08 PROCEDURE — 82948 REAGENT STRIP/BLOOD GLUCOSE: CPT

## 2024-04-08 PROCEDURE — 63710000001 PREDNISOLONE PER 5 MG: Performed by: NURSE PRACTITIONER

## 2024-04-08 PROCEDURE — 99232 SBSQ HOSP IP/OBS MODERATE 35: CPT | Performed by: NURSE PRACTITIONER

## 2024-04-08 PROCEDURE — 25810000003 LACTATED RINGERS PER 1000 ML: Performed by: STUDENT IN AN ORGANIZED HEALTH CARE EDUCATION/TRAINING PROGRAM

## 2024-04-08 PROCEDURE — 80053 COMPREHEN METABOLIC PANEL: CPT | Performed by: STUDENT IN AN ORGANIZED HEALTH CARE EDUCATION/TRAINING PROGRAM

## 2024-04-08 PROCEDURE — 25010000002 CEFTRIAXONE PER 250 MG: Performed by: NURSE PRACTITIONER

## 2024-04-08 PROCEDURE — 83735 ASSAY OF MAGNESIUM: CPT

## 2024-04-08 PROCEDURE — 25010000002 MIDAZOLAM PER 1 MG: Performed by: STUDENT IN AN ORGANIZED HEALTH CARE EDUCATION/TRAINING PROGRAM

## 2024-04-08 RX ORDER — FOLIC ACID 1 MG/1
1 TABLET ORAL DAILY
Status: DISCONTINUED | OUTPATIENT
Start: 2024-04-09 | End: 2024-04-09

## 2024-04-08 RX ORDER — LANSOPRAZOLE 30 MG/1
30 TABLET, ORALLY DISINTEGRATING, DELAYED RELEASE ORAL
Status: DISCONTINUED | OUTPATIENT
Start: 2024-04-09 | End: 2024-04-09

## 2024-04-08 RX ORDER — FUROSEMIDE 20 MG/1
20 TABLET ORAL DAILY
Status: DISCONTINUED | OUTPATIENT
Start: 2024-04-09 | End: 2024-04-09

## 2024-04-08 RX ORDER — FERROUS SULFATE 300 MG/5ML
300 LIQUID (ML) ORAL DAILY
Status: DISCONTINUED | OUTPATIENT
Start: 2024-04-09 | End: 2024-04-09

## 2024-04-08 RX ORDER — ZINC SULFATE 50(220)MG
220 CAPSULE ORAL DAILY
Status: DISCONTINUED | OUTPATIENT
Start: 2024-04-09 | End: 2024-04-09

## 2024-04-08 RX ORDER — LACTULOSE 10 G/15ML
20 SOLUTION ORAL 2 TIMES DAILY
Status: DISCONTINUED | OUTPATIENT
Start: 2024-04-08 | End: 2024-04-09

## 2024-04-08 RX ORDER — SPIRONOLACTONE 25 MG/1
12.5 TABLET ORAL DAILY
Status: DISCONTINUED | OUTPATIENT
Start: 2024-04-09 | End: 2024-04-09

## 2024-04-08 RX ORDER — THIAMINE HYDROCHLORIDE 100 MG/ML
200 INJECTION, SOLUTION INTRAMUSCULAR; INTRAVENOUS EVERY 8 HOURS SCHEDULED
Status: DISCONTINUED | OUTPATIENT
Start: 2024-04-08 | End: 2024-04-10 | Stop reason: HOSPADM

## 2024-04-08 RX ORDER — LIDOCAINE HYDROCHLORIDE 10 MG/ML
10 INJECTION, SOLUTION EPIDURAL; INFILTRATION; INTRACAUDAL; PERINEURAL ONCE
Status: DISCONTINUED | OUTPATIENT
Start: 2024-04-08 | End: 2024-04-09

## 2024-04-08 RX ORDER — PREDNISOLONE SODIUM PHOSPHATE 15 MG/5ML
40 SOLUTION ORAL DAILY
Status: DISCONTINUED | OUTPATIENT
Start: 2024-04-09 | End: 2024-04-09

## 2024-04-08 RX ADMIN — Medication 1000 ML: at 15:16

## 2024-04-08 RX ADMIN — PREDNISOLONE SODIUM PHOSPHATE 40 MG: 15 SOLUTION ORAL at 10:26

## 2024-04-08 RX ADMIN — THIAMINE HYDROCHLORIDE 200 MG: 100 INJECTION, SOLUTION INTRAMUSCULAR; INTRAVENOUS at 15:15

## 2024-04-08 RX ADMIN — Medication 1000 ML: at 10:31

## 2024-04-08 RX ADMIN — FUROSEMIDE 20 MG: 20 TABLET ORAL at 10:27

## 2024-04-08 RX ADMIN — Medication 10 ML: at 10:30

## 2024-04-08 RX ADMIN — THIAMINE HYDROCHLORIDE 200 MG: 100 INJECTION, SOLUTION INTRAMUSCULAR; INTRAVENOUS at 21:27

## 2024-04-08 RX ADMIN — LACTULOSE 20 G: 20 SOLUTION ORAL at 21:27

## 2024-04-08 RX ADMIN — SPIRONOLACTONE 12.5 MG: 25 TABLET ORAL at 10:27

## 2024-04-08 RX ADMIN — Medication 1000 ML: at 21:28

## 2024-04-08 RX ADMIN — MIDAZOLAM HYDROCHLORIDE 2 MG: 2 INJECTION, SOLUTION INTRAMUSCULAR; INTRAVENOUS at 03:12

## 2024-04-08 RX ADMIN — FOLIC ACID 1 MG: 1 TABLET ORAL at 10:27

## 2024-04-08 RX ADMIN — FERROUS SULFATE TAB EC 324 MG (65 MG FE EQUIVALENT) 324 MG: 324 (65 FE) TABLET DELAYED RESPONSE at 10:27

## 2024-04-08 RX ADMIN — THERA TABS 1 TABLET: TAB at 10:27

## 2024-04-08 RX ADMIN — CEFTRIAXONE 1000 MG: 1 INJECTION, POWDER, FOR SOLUTION INTRAMUSCULAR; INTRAVENOUS at 12:41

## 2024-04-08 RX ADMIN — Medication 220 MG: at 10:27

## 2024-04-08 RX ADMIN — RIFAXIMIN 550 MG: 550 TABLET ORAL at 21:26

## 2024-04-08 RX ADMIN — RIFAXIMIN 550 MG: 550 TABLET ORAL at 10:29

## 2024-04-08 RX ADMIN — Medication 10 ML: at 21:28

## 2024-04-08 RX ADMIN — THIAMINE HYDROCHLORIDE 200 MG: 100 INJECTION, SOLUTION INTRAMUSCULAR; INTRAVENOUS at 05:20

## 2024-04-08 RX ADMIN — PANTOPRAZOLE SODIUM 40 MG: 40 TABLET, DELAYED RELEASE ORAL at 10:27

## 2024-04-08 RX ADMIN — SODIUM CHLORIDE, POTASSIUM CHLORIDE, SODIUM LACTATE AND CALCIUM CHLORIDE 100 ML/HR: 600; 310; 30; 20 INJECTION, SOLUTION INTRAVENOUS at 21:27

## 2024-04-08 NOTE — PROGRESS NOTES
Nutrition Services    Patient Name: Nelda Alcocer  YOB: 1960  MRN: [id number]  Admission date: 4/3/2024    - Change TF to Novasource Renal 120 mL bolus 6 times a day per NGT + 10 mL FWF before and after each bolus         -TF will provide 1440 kcals, 66 g protein and 631 mL fluid    -RD will monitor and advance per clinical course      -Check tube placement prior to each bolus to prevent aspiration due to patient frequently pulling at tube.       PROGRESS NOTE      Encounter Information: Checking in on patient to monitor TF tolerance and Nutrition POC. RD visited patient at bedside and patient was unable to accurately answer questions.  Patient did respond but was confused and thought she was about to go home now. Spoke with RN in detail about TF and DHT issues today. Patient has dislodged DHT twice today and continues to pull at the tube from time to time. RN is hesitant to begin continuous TF due to increased risk of aspiration with patient continually dislodging DHT.  RD and RN collaborated and decided to trial bolus feedings for patient in order to restart nutrition to the patient but also decrease risk of aspiration for now.   RD to adjust TF order to begin bolus feeds today.  Patient presents with severe chronic disease related malnutrition. Patient does have po diet available but is not eating much at all currently (< 10% of meals).        PO Diet: Diet: Cardiac; Low Sodium (2g); Fluid Consistency: Thin (IDDSI 0)   PO Supplements: None ordered    PO Intake:  0-10% of meals       Current nutrition support: Impact Peptide 1.5 at 30 mL/hr + 10 mL/hr water flush    Nutrition support review: TF held at this time due to patient dislodging DHT 2 times today.  RN concerned about risk of aspiration       Labs (reviewed below): Reviewed. Management per attending.         GI Function:  Last documented BM 4/6.       Nutrition Intervention Updates: - Change TF to Novasource Renal 120 mL bolus 6 times a  "day per NGT + 10 mL FWF before and after each bolus         -TF will provide 1440 kcals, 66 g protein and 631 mL fluid    -RD will monitor and advance per clinical course      -Check tube placement prior to each bolus to prevent aspiration due to patient frequently pulling at tube.        Results from last 7 days   Lab Units 04/08/24  0524 04/07/24  0505 04/06/24  0512   SODIUM mmol/L 140 139 139   POTASSIUM mmol/L 5.0 4.5 4.6   CHLORIDE mmol/L 112* 110* 110*   CO2 mmol/L 19.0* 18.0* 16.0*   BUN mg/dL 42* 36* 33*   CREATININE mg/dL 1.01* 1.02* 1.09*   CALCIUM mg/dL 9.5 9.4 9.2   BILIRUBIN mg/dL 5.3* 6.0* 6.1*   ALK PHOS U/L 132* 160* 133*   ALT (SGPT) U/L 45* 38* 31   AST (SGOT) U/L 88* 88* 80*   GLUCOSE mg/dL 122* 128* 97     Results from last 7 days   Lab Units 04/08/24  0524 04/07/24  0505 04/04/24  0214 04/04/24  0054   MAGNESIUM mg/dL 1.8 1.7  --  1.9   PHOSPHORUS mg/dL 3.8 3.5   < >  --    HEMOGLOBIN g/dL  --  9.0*   < >  --    HEMATOCRIT %  --  28.6*   < >  --     < > = values in this interval not displayed.     COVID19   Date Value Ref Range Status   04/03/2024 Not Detected Not Detected - Ref. Range Final     No results found for: \"HGBA1C\"      RD to follow up per protocol.    Electronically signed by:  Leesa Yanes RD  04/08/24 12:41 EDT    "

## 2024-04-08 NOTE — CASE MANAGEMENT/SOCIAL WORK
Continued Stay Note   [facility]     Patient Name: Nelda Alcocer  MRN: [id number]  Today's Date: 4/8/2024    Admit Date: 4/3/2024    Plan: [facility] referral pending acceptance. Will require precert. PASRR approved. From home with family.   Discharge Plan       Row Name 04/08/24 1005       Plan    Plan [facility] referral pending acceptance. Will require precert. PASRR approved. From home with family.    Plan Comments CM contacted [facility] liaison Cheri to inquire status of pending referral. Liaison reported that they do not have an appropriate bed at this time. New referral added in basket to next SNF choice noted, [facility]. Notified liaangelica [facility] to review.             Mindy Paez RN     Office Phone: [phone]  Office Cell: [phone]

## 2024-04-08 NOTE — PROGRESS NOTES
LOS: 5 days   Patient Care Team:  Kathy Hayes APRN as PCP - General (Nurse Practitioner)      Subjective     Interval History:     Subjective: Patient with no new complaints.  Denies abdominal pain.  Reports multiple bowel movements overnight without overt GI bleeding.      ROS:   No chest pain, shortness of breath, or cough.        Medication Review:     Current Facility-Administered Medications:     albumin human 25 % IV SOLN 37.5 g, 37.5 g, Intravenous, Once **OR** albumin human 25 % IV SOLN 50 g, 50 g, Intravenous, Once **OR** albumin human 25 % IV SOLN 62.5 g, 62.5 g, Intravenous, Once **OR** albumin human 25 % IV SOLN 75 g, 75 g, Intravenous, Once **OR** albumin human 25 % IV SOLN 87.5 g, 87.5 g, Intravenous, Once **OR** albumin human 25 % IV SOLN 100 g, 100 g, Intravenous, Once **OR** albumin human 25 % IV SOLN 112.5 g, 112.5 g, Intravenous, Once, Tish Jiang APRN    aluminum sulfate-calcium acetate 1:20 ([place]) topical astringent solution 1,000 mL, 1,000 mL, Topical, Q8H, Matthew Kaye MD, 1,000 mL at 04/07/24 2200    cefTRIAXone (ROCEPHIN) 1,000 mg in sodium chloride 0.9 % 100 mL MBP, 1,000 mg, Intravenous, Q24H, Tish Jiang APRN, Last Rate: 200 mL/hr at 04/07/24 1355, 1,000 mg at 04/07/24 1355    ferrous sulfate EC tablet 324 mg, 324 mg, Oral, Daily With Breakfast, Tish Jiang APRN, 324 mg at 04/07/24 0829    folic acid (FOLVITE) tablet 1 mg, 1 mg, Oral, Daily, Nakul Khan MD, 1 mg at 04/07/24 0829    furosemide (LASIX) tablet 20 mg, 20 mg, Oral, Daily, Nakul Khan MD, 20 mg at 04/07/24 0829    guaifenesin (ROBITUSSIN) 100 MG/5ML liquid 200 mg, 200 mg, Oral, Q4H PRN, Miguel Murguia MD, 200 mg at 04/05/24 0510    lactated ringers infusion, 100 mL/hr, Intravenous, Continuous, Prosper Dawson MD, Last Rate: 100 mL/hr at 04/05/24 0530, 100 mL/hr at 04/05/24 0530    lactulose (CHRONULAC) 10 GM/15ML solution 20 g, 20 g, Oral, BID, Tish Jiang APRN, 20 g at  04/07/24 0829    LORazepam (ATIVAN) tablet 1 mg, 1 mg, Oral, Q1H PRN, 1 mg at 04/05/24 0511 **OR** midazolam (VERSED) injection 2 mg, 2 mg, Intravenous, Q1H PRN, 2 mg at 04/08/24 0312 **OR** LORazepam (ATIVAN) tablet 2 mg, 2 mg, Oral, Q1H PRN, 2 mg at 04/05/24 2147 **OR** midazolam (VERSED) injection 4 mg, 4 mg, Intravenous, Q1H PRN, 4 mg at 04/04/24 0410 **OR** midazolam (VERSED) injection 4 mg, 4 mg, Intravenous, Q15 Min PRN **OR** midazolam (VERSED) injection 4 mg, 4 mg, Intramuscular, Q15 Min PRN, Prosper Dawson MD    Magnesium Standard Dose Replacement - Follow Nurse / BPA Driven Protocol, , Does not apply, PRN, Prosper Dawson MD    multivitamin (THERAGRAN) tablet 1 tablet, 1 tablet, Oral, Daily, Melanie Rosales APRN, 1 tablet at 04/07/24 0829    ondansetron ODT (ZOFRAN-ODT) disintegrating tablet 4 mg, 4 mg, Oral, Q6H PRN **OR** ondansetron (ZOFRAN) injection 4 mg, 4 mg, Intravenous, Q6H PRN, Prosper Dawson MD, 4 mg at 04/05/24 0202    pantoprazole (PROTONIX) EC tablet 40 mg, 40 mg, Oral, BID AC, Melanie Rosales APRN, 40 mg at 04/07/24 0829    prednisoLONE sodium phosphate (ORAPRED) 15 MG/5ML oral solution 40 mg, 40 mg, Oral, Daily, Melanie Rosales APRN, 40 mg at 04/07/24 0829    riFAXIMin (XIFAXAN) tablet 550 mg, 550 mg, Oral, Q12H, Melanie Rosales APRN, 550 mg at 04/07/24 0841    [COMPLETED] Insert Peripheral IV, , , Once **AND** sodium chloride 0.9 % flush 10 mL, 10 mL, Intravenous, PRN, Karissa Marsh, MICHAELLE    sodium chloride 0.9 % flush 10 mL, 10 mL, Intravenous, Q12H, Prosper Dawson MD, 10 mL at 04/07/24 0954    sodium chloride 0.9 % flush 10 mL, 10 mL, Intravenous, PRN, Prosper Dawson MD    sodium chloride 0.9 % infusion 40 mL, 40 mL, Intravenous, PRN, Prosper Dawson MD    spironolactone (ALDACTONE) tablet 12.5 mg, 12.5 mg, Oral, Daily, Nakul Khan MD, 12.5 mg at 04/07/24 0841    [COMPLETED] thiamine (B-1) 500 mg in sodium chloride 0.9 % 100 mL IVPB, 500 mg, Intravenous, Q8H, Last Rate:  200 mL/hr at 04/06/24 1510, 500 mg at 04/06/24 1510 **FOLLOWED BY** thiamine (B-1) injection 200 mg, 200 mg, Intravenous, Q8H, 200 mg at 04/08/24 0520 **FOLLOWED BY** [START ON 4/11/2024] thiamine (VITAMIN B-1) tablet 100 mg, 100 mg, Oral, Daily, Prosper Dawson MD    zinc sulfate (ZINCATE) capsule 220 mg, 220 mg, Oral, Daily, Melanie Rosales APRN, 220 mg at 04/07/24 0841      Objective     Vital Signs  Vitals:    04/07/24 2123 04/08/24 0133 04/08/24 0500 04/08/24 0627   BP: 141/77 138/82  128/93   BP Location: Right arm Right arm  Right arm   Patient Position: Lying Lying  Lying   Pulse: 109 113  101   Resp: 14 13  18   Temp: 97.5 °F (36.4 °C) 97.6 °F (36.4 °C)  97.4 °F (36.3 °C)   TempSrc: Oral Axillary  Oral   SpO2: 91% 96%  95%   Weight:   50.3 kg (110 lb 14.3 oz)    Height:           Physical Exam:     General Appearance:    Awake and alert, in no acute distress   Head:    Normocephalic, without obvious abnormality   Eyes:          Conjunctivae normal, icteric sclera   Throat:   No oral lesions, no thrush, oral mucosa moist   Neck:   No adenopathy, supple, no JVD   Lungs:     respirations regular, even and unlabored   Abdomen:     Soft, non-tender, no rebound or guarding, moderate distention   Rectal:     Deferred   Extremities:   No edema, no cyanosis   Skin:   No bruising or rash, jaundice        Results Review:    CBC    Results from last 7 days   Lab Units 04/07/24  0505 04/06/24  0512 04/05/24  0024 04/04/24  0837 04/04/24  0214 04/03/24  1359   WBC 10*3/mm3 15.19* 13.78* 10.82* 9.46 10.83* 15.52*   HEMOGLOBIN g/dL 9.0* 8.8* 7.5* 7.6* 6.1* 7.1*   PLATELETS 10*3/mm3 142 107* 87* 61* 102* 85*     CMP   Results from last 7 days   Lab Units 04/08/24  0524 04/07/24  0505 04/06/24  1022 04/06/24  0512 04/05/24  0024 04/04/24  0054 04/03/24  1405 04/03/24  1359   SODIUM mmol/L 140 139  --  139 139 143  --  141   POTASSIUM mmol/L 5.0 4.5  --  4.6 4.0 3.9  --  4.8   CHLORIDE mmol/L 112* 110*  --  110* 111* 114*   "--  108*   CO2 mmol/L 19.0* 18.0*  --  16.0* 17.0* 17.0*  --  16.0*   BUN mg/dL 42* 36*  --  33* 29* 31*  --  36*   CREATININE mg/dL 1.01* 1.02*  --  1.09* 0.94 1.01*  --  1.12*   GLUCOSE mg/dL 122* 128*  --  97 106* 142*  --  119*   ALBUMIN g/dL 2.6* 2.9*  --  2.8* 2.7* 2.8*  --  3.3*   BILIRUBIN mg/dL 5.3* 6.0*  --  6.1* 6.4* 9.4*  --  10.8*   ALK PHOS U/L 132* 160*  --  133* 156* 166*  --  167*   AST (SGOT) U/L 88* 88*  --  80* 52* 60*  --  79*   ALT (SGPT) U/L 45* 38*  --  31 25 26  --  31   MAGNESIUM mg/dL 1.8 1.7  --   --   --  1.9  --  2.1   PHOSPHORUS mg/dL 3.8 3.5  --   --   --   --   --   --    LIPASE U/L  --   --   --   --   --   --   --  149*   AMMONIA umol/L  --   --  54*  --  41  --  131*  --      Cr Clearance Estimated Creatinine Clearance: 45.9 mL/min (A) (by C-G formula based on SCr of 1.01 mg/dL (H)).  Coag   Results from last 7 days   Lab Units 04/06/24  0512 04/05/24  0024 04/04/24  0054 04/03/24  1359   INR  1.47* 1.62* 1.85* 1.68*     HbA1C No results found for: \"HGBA1C\"  Results from last 7 days   Lab Units 04/03/24  1612 04/03/24  1359   CK TOTAL U/L  --  25   HSTROP T ng/L 18* 19*     Infection   Results from last 7 days   Lab Units 04/03/24  1551 04/03/24  1400 04/03/24  1359   BLOODCX   --  No growth at 4 days No growth at 4 days   URINECX  >100,000 CFU/mL Escherichia coli*  --   --      UA    Results from last 7 days   Lab Units 04/03/24  1551   NITRITE UA  Positive*   WBC UA /HPF 6-10*   BACTERIA UA /HPF 4+*   SQUAM EPITHEL UA /HPF 7-12*   URINECX  >100,000 CFU/mL Escherichia coli*     Microbiology Results (last 10 days)       Procedure Component Value - Date/Time    Urine Culture - Urine, Straight Cath [[id number]]  (Abnormal)  (Susceptibility) Collected: 04/03/24 1551    Lab Status: Final result Specimen: Urine from Straight Cath Updated: 04/04/24 2335     Urine Culture >100,000 CFU/mL Escherichia coli    Narrative:      Colonization of the urinary tract without infection is common. " Treatment is discouraged unless the patient is symptomatic, pregnant, or undergoing an invasive urologic procedure.    Susceptibility        Escherichia coli      JENNIFER      Amoxicillin + Clavulanate Susceptible      Ampicillin Susceptible      Ampicillin + Sulbactam Susceptible      Cefazolin Susceptible      Cefepime Susceptible      Ceftazidime Susceptible      Ceftriaxone Susceptible      Gentamicin Susceptible      Levofloxacin Susceptible      Nitrofurantoin Susceptible      Piperacillin + Tazobactam Susceptible      Trimethoprim + Sulfamethoxazole Susceptible                           Respiratory Panel PCR w/COVID-19(SARS-CoV-2) [facility]BBLES/DARRYN/ZULEYKA/[facility] In-House, NP Swab in UTM/VTM, 2 HR TAT - Swab, Nasopharynx [[id number]]  (Normal) Collected: 04/03/24 1407    Lab Status: Final result Specimen: Swab from Nasopharynx Updated: 04/03/24 1501     ADENOVIRUS, PCR Not Detected     Coronavirus 229E Not Detected     Coronavirus HKU1 Not Detected     Coronavirus NL63 Not Detected     Coronavirus OC43 Not Detected     COVID19 Not Detected     Human Metapneumovirus Not Detected     Human Rhinovirus/Enterovirus Not Detected     Influenza A PCR Not Detected     Influenza B PCR Not Detected     Parainfluenza Virus 1 Not Detected     Parainfluenza Virus 2 Not Detected     Parainfluenza Virus 3 Not Detected     Parainfluenza Virus 4 Not Detected     RSV, PCR Not Detected     Bordetella pertussis pcr Not Detected     Bordetella parapertussis PCR Not Detected     Chlamydophila pneumoniae PCR Not Detected     Mycoplasma pneumo by PCR Not Detected    Narrative:      In the setting of a positive respiratory panel with a viral infection PLUS a negative procalcitonin without other underlying concern for bacterial infection, consider observing off antibiotics or discontinuation of antibiotics and continue supportive care. If the respiratory panel is positive for atypical bacterial infection (Bordetella pertussis, Chlamydophila  pneumoniae, or Mycoplasma pneumoniae), consider antibiotic de-escalation to target atypical bacterial infection.    Blood Culture - Blood, Arm, Right [[id number]]  (Normal) Collected: 04/03/24 1400    Lab Status: Preliminary result Specimen: Blood from Arm, Right Updated: 04/07/24 1415     Blood Culture No growth at 4 days    Blood Culture - Blood, Arm, Left [[id number]]  (Normal) Collected: 04/03/24 1359    Lab Status: Preliminary result Specimen: Blood from Arm, Left Updated: 04/07/24 1415     Blood Culture No growth at 4 days          Imaging Results (Last 72 Hours)       Procedure Component Value Units Date/Time    CT Abdomen Pelvis Without Contrast [[id number]] Collected: 04/08/24 0725     Updated: 04/08/24 0756    Narrative:      CT ABDOMEN PELVIS WO CONTRAST    Date of Exam: 4/7/2024 8:25 PM EDT    Indication: Bowel obstruction suspected.    Comparison: CT of the abdomen and pelvis 4/3/2024    Technique: Axial CT images were obtained of the abdomen and pelvis without the administration of contrast. Sagittal and coronal reconstructions were performed.  Automated exposure control and iterative reconstruction methods were used.      Findings:  LUNG BASES: There is a layering right pleural effusion with compressive atelectasis of the right lower lobe.    LIVER: Diffuse patchy heterogeneous appearance of the liver with nodular contour again noted.  BILIARY/GALLBLADDER: Small calcified stones are present in the gallbladder.  SPLEEN:  Unremarkable  PANCREAS:  Unremarkable  ADRENAL:  Unremarkable  KIDNEYS:  Unremarkable parenchyma with no solid mass identified. No obstruction.  No calculus identified.  GASTROINTESTINAL/MESENTERY: An enteric tube is present with the tip in the stomach. Multiple dilated fluid-filled small bowel loops are present. Additionally, there is colonic dilatation from the cecum through the splenic flexure.  AORTA/IVC: There is moderate to marked aortic atherosclerotic disease. There is no  aneurysm.    RETROPERITONEUM/LYMPH NODES: Increased abdominopelvic ascites compared to the previous study.    REPRODUCTIVE: Hysterectomy.  BLADDER:  Unremarkable    There is a left inguinal hernia containing omental fat and ascites.    OSSEUS STRUCTURES:  Typical for age with no acute process identified.      Impression:      Impression:    1. Dilated small and large bowel loops to the level of the splenic flecture. Findings may represent ileus versus partial obstruction. Clinical correlation and close follow-up are recommended.  2. Cirrhosis with increased ascites.  3. Layering right pleural effusion and compressive atelectasis of the right lower lobe.  4. Cholelithiasis.      Findings were communicated to MICHAELLE Swift and Dr. Khan on 4/8/2024 at 7:50 a.m. eastern standard time.          Electronically Signed: Zenia Price MD    4/8/2024 7:54 AM EDT    Workstation ID: [id number]    XR Abdomen KUB [[id number]] Collected: 04/07/24 1913     Updated: 04/07/24 1917    Narrative:      XR ABDOMEN KUB    Date of Exam: 4/7/2024 6:10 PM EDT    Indication: NG placement    Comparison: 4/6/2024    Findings:  Esophagogastric tube tip overlies the proximal stomach. Air-filled dilated bowel loops again partially imaged overlying the upper abdomen.      Impression:      Impression:  Esophagogastric tube tip overlies the proximal stomach.    Gas-distended bowel loops overlying upper abdomen again could relate to ileus or obstruction, partially imaged.      Electronically Signed: Giovani Ohara MD    4/7/2024 7:14 PM EDT    Workstation ID: [id number]    XR Abdomen KUB [[id number]] Collected: 04/06/24 1342     Updated: 04/06/24 1347    Narrative:        XR ABDOMEN KUB    Date of Exam: 4/6/2024 1:39 PM EDT    Indication: tube feed placement varification    Comparison: April 3, 2024    Findings:  Enteric tube terminates in the left upper quadrant, with tip likely in the mid stomach.    There is gaseous distention of bowel loops.  There is possible dilatation of central small bowel loops. There is some suspected gas in the colon as well.      Impression:      Impression:  Enteric tube terminates in the left upper quadrant, with tip likely in the mid stomach.    Gas distended bowel loops with possible dilatation of central small bowel loops. Correlate for ileus or obstruction.    Electronically Signed: Dontae Hartapollo    4/6/2024 1:45 PM EDT    Workstation ID: [id number]            Assessment & Plan     ASSESSMENT:  -Alcohol hepatitis/cirrhosis  -Cirrhosis complicated by ascites and nonbleeding esophageal varices  -Abnormal CT liver   -Hepatic encephalopathy  -Macrocytic anemia  -Leukocytosis  -UTI  -Cholelithiasis  -COPD  -MS  -History of DVT  -Alcohol abuse    PLAN:  Patient is a 64-year-old female with history of EtOH cirrhosis and continued EtOH use who presented on 4/3 with altered mental status and increasing weakness and inability to care for herself at home.  CT abdomen/pelvis W on 4/3 showed cirrhosis with patchy heterogenicity of the liver parenchyma which could be due to diffuse hepatocellular disease or multicentric liver lesions.  AFP normal.  Could consider outpatient MRI liver mass protocol.    Total bilirubin 5.3 from 6.0, alk phos 132 from 160, AST 88 from 88, and ALT 45 from 38.  Creatinine 1.01 from 1.02.  Continue low-dose diuretics.  CT abdomen/pelvis WO on 4/7 shows dilated small and large bowel loops to the level of the splenic flexure, cirrhosis with increased ascites, layering right pleural effusion, and cholelithiasis.  Paracentesis planned for today.  Send fluid studies.  Replace albumin per protocol.  Continue empiric Rocephin.  Continue lactulose, Xifaxan, PPI, prednisolone, zinc, and multivitamin.  Prednisolone started on 4/3.  Will calculate Lille score on 4/10.   Recommend complete EtOH cessation.  Diet as tolerated.  Continue tube feeds per Dobbhoff tube per dietitian recommendations.  If oral intake remains poor,  could consider EGD with NJ tube placement and bridle.  Continue supportive care.      Electronically signed by MICHAELLE Burns, 04/08/24, 9:49 AM EDT.

## 2024-04-08 NOTE — CASE MANAGEMENT/SOCIAL WORK
Continued Stay Note  [facility]     Patient Name: Nelda Alcocer  MRN: [id number]  Today's Date: 4/8/2024    Admit Date: 4/3/2024    Plan: Accepted to [facility] pending bed availability. Will require JOHN ward approved. From home with family. [facility] referral for EOS.   Discharge Plan       Row Name 04/08/24 1339       Plan    Plan Accepted to [facility] pending bed availability. Will require JOHN ward approved. From home with family. [facility] referral for EOS.    Plan Comments Received update from palliative care team that [facility] referral was sent for explanation of services.               Mindy Paez RN     Office Phone: [phone]  Office Cell: [phone]

## 2024-04-08 NOTE — DISCHARGE PLACEMENT REQUEST
"Nelda Alcocer (64 y.o. Female)       Date of Birth   1960    Social Security Number       Address   [id number] [place] [address] [place] [facility]    Home Phone   [phone]    MRN   [id number]       Synagogue   Nondenominational    Marital Status                               Admission Date   4/3/24    Admission Type   Emergency    Admitting Provider       Attending Provider   Nakul Khan MD    Department, Room/Bed   [facility],        Discharge Date       Discharge Disposition       Discharge Destination                                 Attending Provider: Nakul Khan MD    Allergies: No Known Allergies    Isolation: None   Infection: None   Code Status: CPR    Ht: 160 cm (63\")   Wt: 50.3 kg (110 lb 14.3 oz)    Admission Cmt: None   Principal Problem: Hepatic encephalopathy [K76.82]                   Active Insurance as of 4/3/2024       Primary Coverage       Payor Plan Insurance Group Employer/Plan Group    ANTHEM MEDICARE REPLACEMENT ANTHEM MEDICARE ADVANTAGE [id number]       Payor Plan Address Payor Plan Phone Number Payor Plan Fax Number Effective Dates    [address] [phone]  2024 - None Entered    [facility] [id number]         Subscriber Name Subscriber Birth Date Member ID       NELDA ALCOCER 1960 [id number]                     Emergency Contacts        (Rel.) Home Phone Work Phone Mobile Phone    DANETTE STEELE (Daughter) -- -- [phone]    Elma Lawson (Daughter) -- -- [phone]    MONTSEJAIDEN TORRES (Spouse) [phone] -- --                 History & Physical        Prosper Dawson MD at [address] [place] [address] Medicine Services  History & Physical    Patient Name: Nelda Alcocer  : 1960  MRN: [id number]  Primary Care Physician:  Provider, No Known  Date of admission: 4/3/2024  Date and Time of Service: 4/3/2024 at 5:45 pm     Subjective      Chief Complaint: worsening " confusion     History of Present Illness: Nelda Alcocer is a 64 y.o. female with a CMH of multiple sclerosis, alcohol use with liver cirrhosis and tobacco abuse DVT lower extremity presented with poor oral intake and progressively worsening confusion.  Patient lives with her  and consents alcohol daily.  Per report patient has not removed from her chair in the last 3 days  was concerned and called EMS    In the ER patient was tachycardic in the range of 125 blood pressure 117/60 and saturating 99% on room air lab was significant for mildly elevated troponin of 19 which subsequently was 18; chloride of 108, bicarb of 16 and anion gap of 17.  Creatinine was elevated 1.12 with elevated BUN/creatinine ratio.  Alkaline phosphatase was 167 and AST was also elevated.  Total bilirubin was 10.8.  Lactic acid was 3 subsequently 3.4.  PT and INR were elevated 17.6 and 1.68 white cell count was 15.2 with neutrophil predominance.  Hemoglobin noted for 7.1 as compared to 12 with anemia  Microcytic features urinalysis was suggestive of infection with nitrate positive and WBC of 6-10 per microscopic field    Chest x-ray showed no acute process; CT of the head without contrast brain atrophy with microvascular ischemic changes and no other significant acute intracranial abnormality.  CT abdomen pelvis contrast showed liver cirrhosis with patchy heterogeneous liver parenchyma suggestive of hepatocellular disease or multicentric liver lesions recommended MRI.  Mild amount of ascites was stable mildly enlarged periportal and retroperitoneal lymph nodes.  Cholelithiasis was also noted      Review of Systems  Limited exam  Personal History     Past Medical History:   Diagnosis Date    Ascites     COPD (chronic obstructive pulmonary disease)     H/O blood clots     left leg    Liver disease     Multiple sclerosis     Vertigo        Past Surgical History:   Procedure Laterality Date    HAND SURGERY Right     HYSTERECTOMY          Family History: family history includes Peripheral vascular disease in her mother. Otherwise pertinent FHx was reviewed and not pertinent to current issue.    Social History:  reports that she has been smoking cigarettes. She has a 52.5 pack-year smoking history. She has never used smokeless tobacco. She reports current alcohol use of about 77.0 standard drinks of alcohol per week. She reports that she does not use drugs.    Home Medications:  Prior to Admission Medications       Prescriptions Last Dose Informant Patient Reported? Taking?    albuterol sulfate  (90 Base) MCG/ACT inhaler   No No    Inhale 2 puffs Every 4 (Four) Hours As Needed for Wheezing or Shortness of Air.              Allergies:  No Known Allergies    Objective      Vitals:   Temp:  [98.1 °F (36.7 °C)] 98.1 °F (36.7 °C)  Heart Rate:  [115-117] 115  Resp:  [19] 19  BP: (106-120)/(45-62) 108/62  Body mass index is 19.66 kg/m².  Physical Exam  Constitutional:       Comments: Emaciated chronically sick looking   HENT:      Head: Atraumatic.   Eyes:      General: Scleral icterus present.   Cardiovascular:      Rate and Rhythm: Tachycardia present.   Pulmonary:      Effort: Pulmonary effort is normal.      Breath sounds: Normal breath sounds.   Abdominal:      General: Bowel sounds are normal.      Palpations: Abdomen is soft.   Musculoskeletal:         General: Normal range of motion.      Cervical back: Neck supple.   Skin:     General: Skin is warm.   Neurological:      Mental Status: She is disoriented.   Psychiatric:         Mood and Affect: Mood normal.         Diagnostic Data:  Lab Results (last 24 hours)       Procedure Component Value Units Date/Time    High Sensitivity Troponin T 2Hr [[id number]] Collected: 04/03/24 1612    Specimen: Blood Updated: 04/03/24 1618    STAT Lactic Acid, Reflex [[id number]] Collected: 04/03/24 1612    Specimen: Blood Updated: 04/03/24 1618    Urinalysis, Microscopic Only - Straight Cath [[id number]]   (Abnormal) Collected: 04/03/24 1551    Specimen: Urine from Straight Cath Updated: 04/03/24 1615     RBC, UA 3-5 /HPF      WBC, UA 6-10 /HPF      Bacteria, UA 4+ /HPF      Squamous Epithelial Cells, UA 7-12 /HPF      Hyaline Casts, UA 3-6 /LPF      Methodology Manual Light Microscopy    Urine Culture - Urine, Straight Cath [[id number]] Collected: 04/03/24 1551    Specimen: Urine from Straight Cath Updated: 04/03/24 1615    Urine Drug Screen - Straight Cath [[id number]] Collected: 04/03/24 1551    Specimen: Urine from Straight Cath Updated: 04/03/24 1610    Urinalysis With Culture If Indicated - Straight Cath [[id number]]  (Abnormal) Collected: 04/03/24 1551    Specimen: Urine from Straight Cath Updated: 04/03/24 1607     Color, UA Orange     Comment: Any Substance that causes an abnormal urine color can alter the accuracy of the chemical reactions.        Appearance, UA Cloudy     pH, UA 5.5     Specific Gravity, UA 1.029     Glucose, UA Negative     Ketones, UA Trace     Bilirubin, UA Small (1+)     Comment: Confirmation testing is unavailable.  A serum bilirubin is recommended for further assessment.        Blood, UA Negative     Protein, UA Negative     Leuk Esterase, UA Moderate (2+)     Nitrite, UA Positive     Urobilinogen, UA 2.0 E.U./dL    Narrative:      In absence of clinical symptoms, the presence of pyuria, bacteria, and/or nitrites on the urinalysis result does not correlate with infection.    Manual Differential [[id number]]  (Abnormal) Collected: 04/03/24 1359    Specimen: Blood Updated: 04/03/24 1512     Neutrophil % 84.0 %      Lymphocyte % 7.0 %      Monocyte % 7.0 %      Bands %  2.0 %      Neutrophils Absolute 13.35 10*3/mm3      Lymphocytes Absolute 1.09 10*3/mm3      Monocytes Absolute 1.09 10*3/mm3      Macrocytes Mod/2+     Ovalocytes Slight/1+     Polychromasia Slight/1+     Toxic Granulation Slight/1+     Platelet Morphology Normal    CBC & Differential [[id number]]  (Abnormal) Collected:  04/03/24 1359    Specimen: Blood Updated: 04/03/24 1512    Narrative:      The following orders were created for panel order CBC & Differential.  Procedure                               Abnormality         Status                     ---------                               -----------         ------                     CBC Auto Differential[[id number]]        Abnormal            Final result               Scan Slide[[id number]]                                       Final result                 Please view results for these tests on the individual orders.    CBC Auto Differential [[id number]]  (Abnormal) Collected: 04/03/24 1359    Specimen: Blood Updated: 04/03/24 1512     WBC 15.52 10*3/mm3      RBC 2.08 10*6/mm3      Hemoglobin 7.1 g/dL      Hematocrit 23.6 %      .5 fL      MCH 34.1 pg      MCHC 30.1 g/dL      RDW 21.6 %      RDW-SD 87.9 fl      MPV 10.5 fL      Platelets 85 10*3/mm3     Narrative:      The previously reported component NRBC is no longer being reported. Previous result was 0.0 /100 WBC (Reference Range: 0.0-0.2 /100 WBC) on 4/3/2024 at 1421 EDT.    Scan Slide [[id number]] Collected: 04/03/24 1359    Specimen: Blood Updated: 04/03/24 1512     Scan Slide --     Comment: See Manual Differential Results       Respiratory Panel PCR w/COVID-19(SARS-CoV-2) PEBBLES/DARRYN/ZULEYKA/PAD/COR/MARYBEL In-House, NP Swab in UTM/VTM, 2 HR TAT - Swab, Nasopharynx [[id number]]  (Normal) Collected: 04/03/24 1407    Specimen: Swab from Nasopharynx Updated: 04/03/24 1501     ADENOVIRUS, PCR Not Detected     Coronavirus 229E Not Detected     Coronavirus HKU1 Not Detected     Coronavirus NL63 Not Detected     Coronavirus OC43 Not Detected     COVID19 Not Detected     Human Metapneumovirus Not Detected     Human Rhinovirus/Enterovirus Not Detected     Influenza A PCR Not Detected     Influenza B PCR Not Detected     Parainfluenza Virus 1 Not Detected     Parainfluenza Virus 2 Not Detected     Parainfluenza Virus 3 Not Detected      Parainfluenza Virus 4 Not Detected     RSV, PCR Not Detected     Bordetella pertussis pcr Not Detected     Bordetella parapertussis PCR Not Detected     Chlamydophila pneumoniae PCR Not Detected     Mycoplasma pneumo by PCR Not Detected    Narrative:      In the setting of a positive respiratory panel with a viral infection PLUS a negative procalcitonin without other underlying concern for bacterial infection, consider observing off antibiotics or discontinuation of antibiotics and continue supportive care. If the respiratory panel is positive for atypical bacterial infection (Bordetella pertussis, Chlamydophila pneumoniae, or Mycoplasma pneumoniae), consider antibiotic de-escalation to target atypical bacterial infection.    Extra Tubes [[id number]] Collected: 04/03/24 1359    Specimen: Blood, Venous Line Updated: 04/03/24 1500    Narrative:      The following orders were created for panel order Extra Tubes.  Procedure                               Abnormality         Status                     ---------                               -----------         ------                     Gold Top - SST[[id number]]                                   Final result                 Please view results for these tests on the individual orders.    Gold Top - SST [[id number]] Collected: 04/03/24 1359    Specimen: Blood Updated: 04/03/24 1500     Extra Tube Hold for add-ons.     Comment: Auto resulted.       Comprehensive Metabolic Panel [[id number]]  (Abnormal) Collected: 04/03/24 1359    Specimen: Blood Updated: 04/03/24 1437     Glucose 119 mg/dL      BUN 36 mg/dL      Creatinine 1.12 mg/dL      Sodium 141 mmol/L      Potassium 4.8 mmol/L      Chloride 108 mmol/L      CO2 16.0 mmol/L      Calcium 10.1 mg/dL      Total Protein 7.2 g/dL      Albumin 3.3 g/dL      ALT (SGPT) 31 U/L      AST (SGOT) 79 U/L      Alkaline Phosphatase 167 U/L      Total Bilirubin 10.8 mg/dL      Globulin 3.9 gm/dL      A/G Ratio 0.8 g/dL      BUN/Creatinine  Ratio 32.1     Anion Gap 17.0 mmol/L      eGFR 55.7 mL/min/1.73     Narrative:      GFR Normal >60  Chronic Kidney Disease <60  Kidney Failure <15      Magnesium [[id number]]  (Normal) Collected: 04/03/24 1359    Specimen: Blood Updated: 04/03/24 1437     Magnesium 2.1 mg/dL     Lipase [[id number]]  (Abnormal) Collected: 04/03/24 1359    Specimen: Blood Updated: 04/03/24 1436     Lipase 149 U/L     CK [[id number]]  (Normal) Collected: 04/03/24 1359    Specimen: Blood Updated: 04/03/24 1436     Creatine Kinase 25 U/L     High Sensitivity Troponin T [[id number]]  (Abnormal) Collected: 04/03/24 1359    Specimen: Blood Updated: 04/03/24 1436     HS Troponin T 19 ng/L     Narrative:      High Sensitive Troponin T Reference Range:  <14.0 ng/L- Negative Female for AMI  <22.0 ng/L- Negative Male for AMI  >=14 - Abnormal Female indicating possible myocardial injury.  >=22 - Abnormal Male indicating possible myocardial injury.   Clinicians would have to utilize clinical acumen, EKG, Troponin, and serial changes to determine if it is an Acute Myocardial Infarction or myocardial injury due to an underlying chronic condition.         Ethanol [[id number]] Collected: 04/03/24 1359    Specimen: Blood Updated: 04/03/24 1436     Ethanol % <0.010 %     Narrative:      Plasma Ethanol Clinical Symptoms:    ETOH (%)               Clinical Symptom  .01-.05              No apparent influence  .03-.12              Euphoria, Diminished judgment and attention   .09-.25              Impaired comprehension, Muscle incoordination  .18-.30              Confusion, Staggered gait, Slurred speech  .25-.40              Markedly decreased response to stimuli, unable to stand or                        walk, vomitting, sleep or stupor  .35-.50              Comatose, Anesthesia, Subnormal body temperature        Ammonia [[id number]]  (Abnormal) Collected: 04/03/24 1405    Specimen: Blood Updated: 04/03/24 1431     Ammonia 131 umol/L     Protime-INR [[id number]]   (Abnormal) Collected: 04/03/24 1359    Specimen: Blood Updated: 04/03/24 1426     Protime 17.6 Seconds      INR 1.68    POC Glucose Once [[id number]]  (Normal) Collected: 04/03/24 1344    Specimen: Blood Updated: 04/03/24 1421     Glucose 101 mg/dL      Comment: Serial Number: [id number]:  [id number]       Blood Culture - Blood, Arm, Right [[id number]] Collected: 04/03/24 1400    Specimen: Blood from Arm, Right Updated: 04/03/24 1411    Blood Culture - Blood, Arm, Left [[id number]] Collected: 04/03/24 1359    Specimen: Blood from Arm, Left Updated: 04/03/24 1411    POC Lactate [[id number]]  (Abnormal) Collected: 04/03/24 1401    Specimen: Blood Updated: 04/03/24 1402     Lactate 3.0 mmol/L      Comment: Serial Number: [id number]:  [id number]                Imaging Results (Last 24 Hours)       Procedure Component Value Units Date/Time    CT Abdomen Pelvis With Contrast [[id number]] Collected: 04/03/24 1529     Updated: 04/03/24 1552    Narrative:      CT ABDOMEN PELVIS W CONTRAST    Date of Exam: 4/3/2024 3:12 PM EDT    Indication: elevated lipase/elevated LFTs.    Comparison: CT abdomen pelvis dated 3/14/2023    Technique: Axial CT images were obtained of the abdomen and pelvis following the uneventful intravenous administration of iodinated contrast. Sagittal and coronal reconstructions were performed.  Automated exposure control and iterative reconstruction   methods were used.        Findings:  There is respiratory artifact limiting the exam. The lung bases are clear.    The liver shows mild diffuse nodular surface. There is interval development of diffuse patchy heterogeneous appearance of the liver parenchyma with ill distinct hypodense areas which could represent diffuse hepatocellular disease or multicentric liver   lesions not conclusive. The liver is enlarged. There are small calcified gallstones. There is mild amount of ascites. The spleen shows homogeneous density. The pancreas shows homogeneous  density with no definite peripancreatic fat stranding. The   underlying respiratory fact limits evaluation for subtle changes. There are few borderline sized periportal and retroperitoneal lymph nodes. Few of the mildly enlarged measuring up to 1.2 cm. The aorta shows a normal diameter with atherosclerosis.    The kidneys show a normal position with no hydronephrosis or nephrolithiasis. The small and large bowel loops are normal in caliber. The bladder is unremarkable. Hysterectomy changes. There is a left inguinal hernia with omental fat content and fluid.   There is enlargement of the left ovarian vein and multiple enlarged left pelvic varices.      Impression:      Impression:    1. Limited exam due to respiratory fact    2. Mild diffuse nodular liver surface concerning for cirrhosis. In addition there is interval development of diffuse patchy heterogeneity of the liver parenchyma unclear if represents diffuse hepatocellular disease or multicentric liver lesions. Further   characterization with a liver MRI with and without contrast recommended    3. Mild amount of ascites    4. Stable mild enlarged periportal and retroperitoneal lymph nodes    5. Cholelithiasis    6. No CT evidence of acute pancreatitis    7. Additional ancillary findings as above            Electronically Signed: Carl Chaves MD    4/3/2024 3:50 PM EDT    Workstation ID: [id number]    CT Head Without Contrast [[id number]] Collected: 04/03/24 1526     Updated: 04/03/24 1531    Narrative:      CT HEAD WO CONTRAST    Date of Exam: 4/3/2024 3:12 PM EDT    Indication: AMS.    Comparison: None available.    Technique: Axial CT images were obtained of the head without contrast administration.  Coronal reconstructions were performed.  Automated exposure control and iterative reconstruction methods were used.      Findings: There is respiratory fat limiting the exam. There is diffuse brain atrophy. Periventricular hypodense areas compatible with chronic  small vessel ischemia    There is no evidence of hemorrhage. There is no mass effect or midline shift.    There is no extracerebral collection.    Ventricles are normal in size and configuration for patient's stated age.     Posterior fossa is within normal limits.    Calvarium and skull base appear intact.  Visualized sinuses show no air fluid levels. Visualized orbits are unremarkable.      Impression:      Impression:  Brain atrophy with chronic microvascular ischemic changes    No evidence of acute intracranial abnormality    Limited exam due to motion artifact      Electronically Signed: Carl Chaves MD    4/3/2024 3:29 PM EDT    Workstation ID: [id number]    XR Chest 1 View [[id number]] Collected: 04/03/24 1444     Updated: 04/03/24 1448    Narrative:      XR CHEST 1 VW    Date of Exam: 4/3/2024 2:27 PM EDT    Indication: ams    Comparison: 11/30/2022.    Findings:  Heart and pulmonary vessels appear within normal limits. Lung fields are well inflated and clear of acute infiltrates or effusions. There is no pneumothorax.      Impression:      Impression:  No acute process.      Electronically Signed: Clara Byrne MD    4/3/2024 2:46 PM EDT    Workstation ID: [id number]              Assessment & Plan        This is a 64 y.o. female with:    Active and Resolved Problems  There are no hospital problems to display for this patient.    #Hepatic encephalopathy with altered mental status  #Alcoholic liver disease  -Daily drinker-elevated ammonia  - Elevated LFTs  - GI team has been consulted patient discriminant score 36 will be started on steroids  IV fluid and oral lactulose  - Monitor for withdrawal zinc multivitamin thiamine and empirically Protonix. Xifaxan  was also added  -Dobbhoff placement given poor nutritional status  Will place patient on CIWA protocol    #JAZLYN likely from volume depletion   #anion gap metabolic acidosis  #Lactic acidosis  -Will give IV fluid hydration  - Monitor renal function  -Will  avoid nephrotoxic medications    #UTI with cystitis  #-Leukocytosis with lactic acidosis  -Post-follow-up urine culture results  - Follow-up blood culture results  - Started on ceftriaxone with await culture result    #Anemia-likely from EtOH  - Hemoglobin 7; will monitor every 6 transfuse to keep hemoglobin above 7 g/dL    # Pancytopenia likely from alcohol intake  -Continue to monitor  - Nutrition team consult    Social work team to follow patient living condition; concern reported by daughter  APS has been informed     DVT prophylaxis:  No DVT prophylaxis order currently exists.        The patient desires to be as follows:    CODE STATUS:           DANETTE STEELE (Daughter)  [phone] (Mobile) ,  is the designated person to make medical decisions on the patient's behalf if She is incapable of doing so. This was clarified with patient and/or next of kin on 4/3/2024 during the course of this H&P.    Admission Status:  I believe this patient meets inpt  status.    Expected Length of Stay: More than 2 midnights    PDMP and Medication Dispenses via Sidebar reviewed and consistent with patient reported medications.    I discussed the patient's findings and my recommendations with patient and family.      Signature:     This document has been electronically signed by Prosper Dawson MD on April 3, 2024 16:43 EDT   [facility] Hospitalist Team     Electronically signed by Prosper Dawson MD at 04/03/24 5116

## 2024-04-08 NOTE — THERAPY EVALUATION
Acute Care - Speech Language Pathology   Swallow Initial Evaluation  Jelani     Patient Name: Nelda Alcocer  : 1960  MRN: [id number]  Today's Date: 2024               Admit Date: 4/3/2024    Visit Dx:     ICD-10-CM ICD-9-CM   1. Altered mental status, unspecified altered mental status type  R41.82 780.97   2. Hyperammonemia  E72.20 270.6   3. Alcoholic cirrhosis of liver with ascites  K70.31 571.2   4. Sepsis without acute organ dysfunction, due to unspecified organism  A41.9 038.9     995.91   5. Urinary tract infection without hematuria, site unspecified  N39.0 599.0   6. Thrombocytopenia  D69.6 287.5   7. Anemia, unspecified type  D64.9 285.9     Patient Active Problem List   Diagnosis    DVT of leg (deep venous thrombosis)    Smoker    Multiple sclerosis    Ascites    Cirrhosis, alcoholic    Hepatic encephalopathy    Altered mental status, unspecified altered mental status type    Severe malnutrition     Past Medical History:   Diagnosis Date    Ascites     COPD (chronic obstructive pulmonary disease)     H/O blood clots     left leg    Liver disease     Multiple sclerosis     Vertigo      Past Surgical History:   Procedure Laterality Date    HAND SURGERY Right     HYSTERECTOMY         SLP Recommendation and Plan  SLP Swallowing Diagnosis: mild, oral dysphagia, functional pharyngeal phase (24 1500)  SLP Diet Recommendation: full liquid diet (24 1500)  Recommended Precautions and Strategies: upright posture during/after eating, small bites of food and sips of liquid, alternate between small bites of food and sips of liquid, general aspiration precautions (24 1500)  SLP Rec. for Method of Medication Administration: meds whole, meds crushed, as tolerated (24 1500)     Monitor for Signs of Aspiration: yes, notify SLP if any concerns (24 1500)     Swallow Criteria for Skilled Therapeutic Interventions Met: demonstrates skilled criteria (24)     Rehab  Potential/Prognosis, Swallowing: good, to achieve stated therapy goals (04/08/24 1500)  Therapy Frequency (Swallow): PRN (04/08/24 1500)  Predicted Duration Therapy Intervention (Days): until discharge (04/08/24 1500)  Oral Care Recommendations: Oral Care BID/PRN, Swab (04/08/24 1500)        SWALLOW EVALUATION (Last 72 Hours)       SLP Adult Swallow Evaluation       Row Name 04/08/24 1500       Rehab Evaluation    Document Type evaluation  -CB    Subjective Information no complaints  -CB    Patient Observations alert;cooperative  -CB    Patient/Family/Caregiver Comments/Observations Patient was able to follow simple directives.  -CB    Patient Effort good  -CB       General Information    Patient Profile Reviewed yes  -CB    Pertinent History Of Current Problem Nelda Alcocer is a 64 y.o. female with a CMH of multiple sclerosis, alcohol use with liver cirrhosis and tobacco abuse DVT lower extremity presented with poor oral intake and progressively worsening confusion.  Patient lives with her  and consents alcohol daily.  Per report patient has not removed from her chair in the last 3 days  was concerned and called EMS     In the ER patient was tachycardic in the range of 125 blood pressure 117/60 and saturating 99% on room air lab was significant for mildly elevated troponin of 19 which subsequently was 18; chloride of 108, bicarb of 16 and anion gap of 17.  Creatinine was elevated 1.12 with elevated BUN/creatinine ratio.  Alkaline phosphatase was 167 and AST was also elevated.  Total bilirubin was 10.8.  Lactic acid was 3 subsequently 3.4.  PT and INR were elevated 17.6 and 1.68 white cell count was 15.2 with neutrophil predominance.  Hemoglobin noted for 7.1 as compared to 12 with anemia  Microcytic features urinalysis was suggestive of infection with nitrate positive and WBC of 6-10 per microscopic field     Chest x-ray showed no acute process; CT of the head without contrast brain atrophy with  microvascular ischemic changes and no other significant acute intracranial abnormality.  CT abdomen pelvis contrast showed liver cirrhosis with patchy heterogeneous liver parenchyma suggestive of hepatocellular disease or multicentric liver lesions recommended MRI.  Mild amount of ascites was stable mildly enlarged periportal and retroperitoneal lymph nodes.  Cholelithiasis was also noted  -CB    Current Method of Nutrition regular textures;thin liquids;nasogastric feedings  Poor oral intake.  -CB       Pain    Additional Documentation Pain Scale: FACES Pre/Post-Treatment (Group)  -CB       Pain Scale: FACES Pre/Post-Treatment    Pain: FACES Scale, Pretreatment 0-->no hurt  -CB    Posttreatment Pain Rating 0-->no hurt  -CB       Oral Motor Structure and Function    Dentition Assessment edentulous  -CB    Secretion Management WNL/WFL  -CB    Mucosal Quality dry  -CB    Gag Response WFL  -CB       Oral Musculature and Cranial Nerve Assessment    Oral Motor General Assessment generalized oral motor weakness  -CB    Oral Motor, Comment Oral mechanism examination revealed patient demonstrated adequate lingual lateralization, elevation and retraction but noted reduce lingual protrusion, A-P movement and labial protrusion/retraction. Lingual strength was reduced. Palatal movement was present. Jaw strength was adequate. Noted positive gag reflex.  -CB       General Eating/Swallowing Observations    Respiratory Support Currently in Use nasal cannula  -CB    O2 Liters 6L  -CB    Eating/Swallowing Skills fed by SLP  -CB    Positioning During Eating upright in bed  -CB    Utensils Used spoon;cup;straw  -CB    Consistencies Trialed pureed;ice chips;thin liquids  -CB       Clinical Swallow Eval    Clinical Swallow Evaluation Summary Patient was seen for dysphagia evaluation per MD order. Most recent chest x-ray revealed no acute process. CT head indicated atrophy with microvascular ischemic changes and not other significant acute  intracranial abnormality. Patient currently has DHT but patient has tendency to pull it out regularly. Nursing was attempting to switch to bolus. According to CT abdomen patient has ileus. Cirrhosis with increase ascities , layering right pleural effusion and compressive atelectasis of the right lower lobe. Patient has history of MS as well.  Patient reports no history of CVA, stricture and/or GERD. Patient reports having pneumonia 1-2 x. Patient has upper and lower dentures but report that they are ill fitted so she essentially does not wear them currently. At rest, patient has wet cough. Oral mechanism examination revealed patient exhibited generalized weakness. Patient did display adequate ROM and mobility of lingual lateralization, elevation and retraction, However, patient demonstrated reduce lingual protrusion, A-P movement and labial protrusion/retraction. Lingual strength was reduced. Jaw strength was adequate. Palatal movement was present. Noted positive gag reflex. Properly positioned patient upright in bed near 90 degree hip flexion prior to trials. Provided trials of ice chips x 2. No overt s/s of aspiration was evident. No wet vocal quality was detected. Provided trials of thins via spoon x 3. Given digital palpatation, swallow response was 3 seconds in duration. No clearing of throat, cough and/or vocal changes were identified. Noted anterior spillage x 2. Provided trials of thins via cup x 2. No overt s/s of aspiration was evident. No wet vocal quality was detected. Provided trials of thins via straw x 3. Patient initiated swallow response within 3 seconds in duration. No overt s/s of aspiration was evident. Vocal quality was clear. Provided trials of puree x 3. Patient demonstrated slow oral transit as much as 8 seconds in duration. No clearing of throat, cough and/or vocal changes were identified. Given patient has an ileus and currently receiving tube feedings, it is recommended that patient  receive a full liquid diet at this time. Patient has poor oral intake and likely will need ongong tube feedings for primary nourishment. ST will continue to follow to assure safety and tolerance with recommended diet.  -CB       SLP Evaluation Clinical Impression    SLP Swallowing Diagnosis mild;oral dysphagia;functional pharyngeal phase  -CB    Functional Impact risk of aspiration/pneumonia  -CB    Rehab Potential/Prognosis, Swallowing good, to achieve stated therapy goals  -CB    Swallow Criteria for Skilled Therapeutic Interventions Met demonstrates skilled criteria  -CB       Recommendations    Therapy Frequency (Swallow) PRN  -CB    Predicted Duration Therapy Intervention (Days) until discharge  -CB    SLP Diet Recommendation full liquid diet  -CB    Recommended Precautions and Strategies upright posture during/after eating;small bites of food and sips of liquid;alternate between small bites of food and sips of liquid;general aspiration precautions  -CB    Oral Care Recommendations Oral Care BID/PRN;Swab  -CB    SLP Rec. for Method of Medication Administration meds whole;meds crushed;as tolerated  -CB    Monitor for Signs of Aspiration yes;notify SLP if any concerns  -CB       Swallow Goals (SLP)    Swallow LTGs Swallow Long Term Goal (free text)  -CB    Swallow STGs diet tolerance goal selection (SLP)  -CB    Diet Tolerance Goal Selection (SLP) Swallow Short Term Goal 1  -CB       (LTG) Swallow    (LTG) Swallow Patient will tolerate safest and least restrictive diet without complications from aspiration.  -CB    Time Frame (Swallow Long Term Goal) by discharge  -CB    Barriers (Swallow Long Term Goal) poor oral intake; ileus  -CB    Progress/Outcomes (Swallow Long Term Goal) new goal  -CB       (STG) Swallow 1    (STG) Swallow 1 Patient will participate in full meal assessment to assure safety and adequacy of recommended diet and independent utilization of safe swallow strategies.  -CB    Time Frame (Swallow  Short Term Goal 1) 1 week  -CB    Progress/Outcomes (Swallow Short Term Goal 1) new goal  -CB              User Key  (r) = Recorded By, (t) = Taken By, (c) = Cosigned By      Initials Name Effective Dates    Kanika Carlos, SLP 09/21/21 -                     EDUCATION  The patient has been educated in the following areas:   Dysphagia (Swallowing Impairment) Oral Care/Hydration.        SLP GOALS       Row Name 04/08/24 1500       (LTG) Swallow    (LTG) Swallow Patient will tolerate safest and least restrictive diet without complications from aspiration.  -CB    Time Frame (Swallow Long Term Goal) by discharge  -CB    Barriers (Swallow Long Term Goal) poor oral intake; ileus  -CB    Progress/Outcomes (Swallow Long Term Goal) new goal  -CB       (STG) Swallow 1    (STG) Swallow 1 Patient will participate in full meal assessment to assure safety and adequacy of recommended diet and independent utilization of safe swallow strategies.  -CB    Time Frame (Swallow Short Term Goal 1) 1 week  -CB    Progress/Outcomes (Swallow Short Term Goal 1) new goal  -CB              User Key  (r) = Recorded By, (t) = Taken By, (c) = Cosigned By      Initials Name Provider Type    Kanika Carlos, SLP Speech and Language Pathologist                       Time Calculation:                RAFAELA Pike  4/8/2024

## 2024-04-08 NOTE — CASE MANAGEMENT/SOCIAL WORK
Continued Stay Note   Jelani     Patient Name: Nelda Alcocer  MRN: [id number]  Today's Date: 4/8/2024    Admit Date: 4/3/2024    Plan: Accepted to [facility] pending bed availability. Will require [facility] ward approved. From home with family.   Discharge Plan       Row Name 04/08/24 1203       Plan    Plan Accepted to [facility] pending bed availability. Will require [facility] ward approved. From home with family.    Plan Comments Received update from [facility] liaison Megan WORTHINGTON that they can accept pending bed availability once ready as long as patient is no longer being monitored with CIWA scale. Pharmacy updated for facility. Must be able to safely remain sitter-free/MedSitter-free for 24 hrs prior to admit to facility.             Mindy Paez RN     Office Phone: [phone]  Office Cell: [phone]

## 2024-04-08 NOTE — NURSING NOTE
Patient not cooperative with DHT securement,  She pulls on it regularly.  auscultation confirms placement.   I am reluctant to start tube feeding as she is at high risk fort tube dislodgment leading to pulmonary aspiration.  She can answer questions related to who she is a where she is but not accurately to situation.  MD notified.

## 2024-04-08 NOTE — PLAN OF CARE
Goal Outcome Evaluation:      Patient was seen for dysphagia evaluation per MD order. Most recent chest x-ray revealed no acute process. CT head indicated atrophy with microvascular ischemic changes and not other significant acute intracranial abnormality. Patient currently has DHT but patient has tendency to pull it out regularly. Nursing was attempting to switch to bolus. According to CT abdomen patient has ileus. Cirrhosis with increase ascities , layering right pleural effusion and compressive atelectasis of the right lower lobe. Patient has history of MS as well.  Patient reports no history of CVA, stricture and/or GERD. Patient reports having pneumonia 1-2 x. Patient has upper and lower dentures but report that they are ill fitted so she essentially does not wear them currently. At rest, patient has wet cough. Oral mechanism examination revealed patient exhibited generalized weakness. Patient did display adequate ROM and mobility of lingual lateralization, elevation and retraction, However, patient demonstrated reduce lingual protrusion, A-P movement and labial protrusion/retraction. Lingual strength was reduced. Jaw strength was adequate. Palatal movement was present. Noted positive gag reflex. Properly positioned patient upright in bed near 90 degree hip flexion prior to trials. Provided trials of ice chips x 2. No overt s/s of aspiration was evident. No wet vocal quality was detected. Provided trials of thins via spoon x 3. Given digital palpatation, swallow response was 3 seconds in duration. No clearing of throat, cough and/or vocal changes were identified. Noted anterior spillage x 2. Provided trials of thins via cup x 2. No overt s/s of aspiration was evident. No wet vocal quality was detected. Provided trials of thins via straw x 3. Patient initiated swallow response within 3 seconds in duration. No overt s/s of aspiration was evident. Vocal quality was clear. Provided trials of puree x 3. Patient  demonstrated slow oral transit as much as 8 seconds in duration. No clearing of throat, cough and/or vocal changes were identified. Given patient has an ileus and currently receiving tube feedings, it is recommended that patient receive a full liquid diet at this time. Patient has poor oral intake and likely will need ongong tube feedings for primary nourishment. ST will continue to follow to assure safety and tolerance with recommended diet.                          SLP Swallowing Diagnosis: mild, oral dysphagia, functional pharyngeal phase (04/08/24 1500)

## 2024-04-08 NOTE — PROGRESS NOTES
[facility] MEDICINE SERVICE  DAILY PROGRESS NOTE    NAME: Nelda Alcocer  : 1960  MRN: [id number]      LOS: 5 days     PROVIDER OF SERVICE: Nakul Khan MD    Chief Complaint: Hepatic encephalopathy    Subjective:         Subjective       Chief Complaint: worsening confusion      History of Present Illness: Nelda Alcocer is a 64 y.o. female with a CMH of multiple sclerosis, alcohol use with liver cirrhosis and tobacco abuse DVT lower extremity presented with poor oral intake and progressively worsening confusion.  Patient lives with her  and consents alcohol daily.  Per report patient has not removed from her chair in the last 3 days  was concerned and called EMS     In the ER patient was tachycardic in the range of 125 blood pressure 117/60 and saturating 99% on room air lab was significant for mildly elevated troponin of 19 which subsequently was 18; chloride of 108, bicarb of 16 and anion gap of 17.  Creatinine was elevated 1.12 with elevated BUN/creatinine ratio.  Alkaline phosphatase was 167 and AST was also elevated.  Total bilirubin was 10.8.  Lactic acid was 3 subsequently 3.4.  PT and INR were elevated 17.6 and 1.68 white cell count was 15.2 with neutrophil predominance.  Hemoglobin noted for 7.1 as compared to 12 with anemia  Microcytic features urinalysis was suggestive of infection with nitrate positive and WBC of 6-10 per microscopic field     Chest x-ray showed no acute process; CT of the head without contrast brain atrophy with microvascular ischemic changes and no other significant acute intracranial abnormality.  CT abdomen pelvis contrast showed liver cirrhosis with patchy heterogeneous liver parenchyma suggestive of hepatocellular disease or multicentric liver lesions recommended MRI.  Mild amount of ascites was stable mildly enlarged periportal and retroperitoneal lymph nodes.  Cholelithiasis was also noted     Interval History:    24-Patient is  seen and examined at bedside while still in the ED, she was laying comfortably in bed, jaundiced appearing awake and alert but not oriented.  She is able to answer yes or no to some questions but unable to follow conversations.  Other than hypotension she is stable  4/5/24 patient seen and examined in bed no acute distress, blood pressure stable, heart rate 111.Pt would benefit from SNF for continued PT at discharge.   4/6/24 patient seen and examined in bed no acute distress, discussed with RN, she is more lethargic today.  Will order ABG and ammonia level.  Family at bedside, long discussion with the daughter regarding advance directive living will.  Will consult palliative.  4/7/24 patient seen and examined in bed no acute distress vital signs blood pressure stable, heart rate 116.  She is more responsive today, discussed with RN.  4/8/24 patient seen and examined in medical distress, blood pressure stable, heart rate 109.  Patient is more alert today she is oriented place time and person.  Discussed with RN.  Objective:     Vital Signs  Temp:  [97.4 °F (36.3 °C)-98.2 °F (36.8 °C)] 97.4 °F (36.3 °C)  Heart Rate:  [101-113] 109  Resp:  [12-20] 20  BP: (128-141)/(77-93) 137/81  Flow (L/min):  [3-6] 6   Body mass index is 19.64 kg/m².    Physical Exam  Appearance: No apparent distress, non-toxic appearing   HEENT/Neck: Neck is supple, Extraocular movements intact,   Cardiovascular: Regular Rate and Rhythm, No murmurs, gallops or rubs   Pulmonary: Clear to auscultation Bilaterally.   Abdomen: BS+, Soft, non-tender, non-distended.   Ext: No Cyanosis, Clubbing, Edema.  Neuro: Non-focal, Alert & awake, confused     Scheduled Meds   albumin human, 37.5 g, Intravenous, Once   Or  albumin human, 50 g, Intravenous, Once   Or  albumin human, 62.5 g, Intravenous, Once   Or  albumin human, 75 g, Intravenous, Once   Or  albumin human, 87.5 g, Intravenous, Once   Or  albumin human, 100 g, Intravenous, Once   Or  albumin human,  112.5 g, Intravenous, Once  aluminum sulfate-calcium acetate 1:20, 1,000 mL, Topical, Q8H  cefTRIAXone, 1,000 mg, Intravenous, Q24H  ferrous sulfate, 324 mg, Oral, Daily With Breakfast  folic acid, 1 mg, Oral, Daily  furosemide, 20 mg, Oral, Daily  lactulose, 20 g, Oral, BID  multivitamin, 1 tablet, Oral, Daily  pantoprazole, 40 mg, Oral, BID AC  prednisoLONE sodium phosphate, 40 mg, Oral, Daily  rifAXIMin, 550 mg, Oral, Q12H  sodium chloride, 10 mL, Intravenous, Q12H  spironolactone, 12.5 mg, Oral, Daily  thiamine (B-1) IV, 200 mg, Intravenous, Q8H   Followed by  [START ON 4/11/2024] thiamine, 100 mg, Oral, Daily  zinc sulfate, 220 mg, Oral, Daily       PRN Meds     guaifenesin    LORazepam **OR** midazolam **OR** LORazepam **OR** midazolam **OR** midazolam **OR** midazolam    Magnesium Standard Dose Replacement - Follow Nurse / BPA Driven Protocol    ondansetron ODT **OR** ondansetron    [COMPLETED] Insert Peripheral IV **AND** sodium chloride    sodium chloride    sodium chloride   Infusions  lactated ringers, 100 mL/hr, Last Rate: 100 mL/hr (04/05/24 0530)          Diagnostic Data    Results from last 7 days   Lab Units 04/08/24  0524 04/07/24  0505   WBC 10*3/mm3  --  15.19*   HEMOGLOBIN g/dL  --  9.0*   HEMATOCRIT %  --  28.6*   PLATELETS 10*3/mm3  --  142   GLUCOSE mg/dL 122* 128*   CREATININE mg/dL 1.01* 1.02*   BUN mg/dL 42* 36*   SODIUM mmol/L 140 139   POTASSIUM mmol/L 5.0 4.5   AST (SGOT) U/L 88* 88*   ALT (SGPT) U/L 45* 38*   ALK PHOS U/L 132* 160*   BILIRUBIN mg/dL 5.3* 6.0*   ANION GAP mmol/L 9.0 11.0       CT Abdomen Pelvis Without Contrast    Result Date: 4/8/2024  Impression: 1. Dilated small and large bowel loops to the level of the splenic flecture. Findings may represent ileus versus partial obstruction. Clinical correlation and close follow-up are recommended. 2. Cirrhosis with increased ascites. 3. Layering right pleural effusion and compressive atelectasis of the right lower lobe. 4.  Cholelithiasis. Findings were communicated to MICHAELLE Swift and Dr. Khan on 4/8/2024 at 7:50 a.m. eastern standard time. Electronically Signed: Zenia Price MD  4/8/2024 7:54 AM EDT  Workstation ID: [id number]    XR Abdomen KUB    Result Date: 4/7/2024  Impression: Esophagogastric tube tip overlies the proximal stomach. Gas-distended bowel loops overlying upper abdomen again could relate to ileus or obstruction, partially imaged. Electronically Signed: Giovani Ohara MD  4/7/2024 7:14 PM EDT  Workstation ID: [id number]    XR Abdomen KUB    Result Date: 4/6/2024  Impression: Enteric tube terminates in the left upper quadrant, with tip likely in the mid stomach. Gas distended bowel loops with possible dilatation of central small bowel loops. Correlate for ileus or obstruction. Electronically Signed: Dontae Noguera  4/6/2024 1:45 PM EDT  Workstation ID: [id number]       I reviewed the patient's new clinical results.    Assessment/Plan:     Active and Resolved Problems  Active Hospital Problems    Diagnosis  POA    **Hepatic encephalopathy [K76.82]  Yes    Severe malnutrition [E43]  Yes    Altered mental status, unspecified altered mental status type [R41.82]  Yes      Resolved Hospital Problems   No resolved problems to display.     Hepatic encephalopathy with altered mental status-improving  Alcoholic liver disease  Alcohol withdrawal  -Patient presented with hepatic encephalopathy in the settings of cirrhosis.  -She had a discriminant factor of 36 on presentation and she was started on prednisolone.  -Gastroenterology following, appreciate recommendations.>recommending Dobbhoff tube feeding. Will monitor liver enzymes. Continue lactulose and Xifaxan. Continue prednisolone zinc and multivitamin. Continue spironolactone and furosemide and monitor creatinine   -Continue lactulose and titrate for 3-4 bowel movements in a day.  -will monitor patient for signs of withdrawal, try to limit benzodiazepine use.      Acute  kidney injury  High anion gap metabolic acidosis  Lactic acidosis  Hypothyroidism  -She was placed on maintenance volume resuscitation, will give 1 L bolus and recheck blood pressure.  -Kidney function mildly improved, anion gap closed, still has a bicarbonate of 17.  -Trend lactic acid until clearance.    UTI with cystitis-100,000 CFU/mL Escherichia coli     Continue ceftriaxone, no growth on urine cultures so far.     Anemia  -She is status post 1 unit packed red blood cell transfusion for hemoglobin of 6.1.  Repeat hemoglobin pending, transfuse for hemoglobin less than 7.  -Platelet 102, continue to monitor.  -Check iron, folate and B12 level.     DVT prophylaxis:SCD  Full code-care planning, greater than 30 minutes    Continue inpatient level of care.  Plan discussed with patient and nurse  Pt would benefit from SNF for continued PT at discharge.     Signature: Electronically signed by Nakul Khan MD, 04/08/24, 10:58 EDT.  [facility] Hospitalist Team

## 2024-04-08 NOTE — NURSING NOTE
Paracentesis not done today. U/S done at bedside on both sides of abdomen and was determined by Yobany, APRN there was not enough fluid to attempt paracentesis.

## 2024-04-08 NOTE — PROGRESS NOTES
Enter Query Response Below      Query Response: Acute kidney injury (JAZLYN) was not supported Electronically signed by Nakul Khan MD, 24, 1:28 PM EDT.              If applicable, please update the problem list.     Patient: Nelad Alcocer        : 1960  Account: [id number]           Admit Date:         How to Respond to this query:       a. Click New Note     b. Answer query within the yellow box.                c. Update the Problem List, if applicable.      If you have any questions about this query contact me at: [email or web address]     Dr. Khan    64 year old female admitted 4/3, diagnosed with acute kidney injury per H&P and progress notes  - . Creatinine 1.12 (4/3) - 0.94 ().     Treatment: daily labs, lactated ringers 100 mL/hr 4/3-     After study, was acute kidney injury (JAZLYN) clinically supported during this admission?    Acute kidney injury (JAZLYN) was supported with additional clinical indicators:____________  Acute kidney injury (JAZLYN) was not supported  Other- specify_____________  Unable to determine       JAZLYN is defined by KDIGO as any of the following:  Increase in creatinine > 0.3 mg/dl within 48 hours or less; or   Increase in creatinine level to > 1.5x baseline (historical or measure), which is known or presumed to have occurred within the prior 7 days   Urine volume <0.5 ml/kg/h for 6 hours.  Reference article: http://www.kdigo.org/clinical_practice_guidelines/pdf/KDIGO%20AKI%20Guideline.pdf (as published in [employer] Supplements, The Official Journal of the International Society of Nephrology, vol. 2, issue 1, 2012.)      By submitting this query, we are merely seeking further clarification of documentation to accurately reflect all conditions that you are monitoring, evaluating, treating or that extend the hospitalization or utilize additional resources of care. Please utilize your independent clinical judgment when addressing the  question(s) above.     This query and your response, once completed, will be entered into the legal medical record.    Sincerely,  Rosangela Powers RN CCDS  Clinical Documentation Integrity Program

## 2024-04-08 NOTE — NURSING NOTE
Hold TF per Raven Madrigal, pending CT scan r/t possible obstruction. Pt abd extremely distended and no BM in 3days. Hold po meds for now per Kaitlin.

## 2024-04-08 NOTE — CONSULTS
Palliative Care Social Work Progress Note    Code Status:full code    Goals of Care: Full Treatment    Narrative: Palliative care  contacted patients daughter Vera to discuss goals of care. KONRAD salgado states she has been giving consents for procedures. Vera states patient does not have any advance directives. Patient is  but spouse has deferred decision making to her. Vera reports spouse is also an alcoholic and not in the best mindset to make decisions.  had lengthy discussion regarding patients condition. Daughter reports she doesn't believe patient has a good quality of life and she is worried about her going home. She states she was working with CM on a rehab choice to keep patient from going home and continuing to drink. Education provided on hospice and daughter requested a referral to [facility] hospice. Referral placed to buzz from [facility] hospice.     Plan: [facility] hosp[facility] referral          Lidia Moya

## 2024-04-09 ENCOUNTER — APPOINTMENT (OUTPATIENT)
Dept: GENERAL RADIOLOGY | Facility: HOSPITAL | Age: 64
End: 2024-04-09
Payer: MEDICARE

## 2024-04-09 ENCOUNTER — INPATIENT HOSPITAL (OUTPATIENT)
Dept: URBAN - METROPOLITAN AREA HOSPITAL 84 | Facility: HOSPITAL | Age: 64
End: 2024-04-09
Payer: MEDICARE

## 2024-04-09 DIAGNOSIS — Z97.8 PRESENCE OF OTHER SPECIFIED DEVICES: ICD-10-CM

## 2024-04-09 DIAGNOSIS — R11.0 NAUSEA: ICD-10-CM

## 2024-04-09 DIAGNOSIS — F10.10 ALCOHOL ABUSE, UNCOMPLICATED: ICD-10-CM

## 2024-04-09 DIAGNOSIS — K70.31 ALCOHOLIC CIRRHOSIS OF LIVER WITH ASCITES: ICD-10-CM

## 2024-04-09 DIAGNOSIS — R14.0 ABDOMINAL DISTENSION (GASEOUS): ICD-10-CM

## 2024-04-09 DIAGNOSIS — I85.10 SECONDARY ESOPHAGEAL VARICES WITHOUT BLEEDING: ICD-10-CM

## 2024-04-09 DIAGNOSIS — R93.2 ABNORMAL FINDINGS ON DIAGNOSTIC IMAGING OF LIVER AND BILIARY: ICD-10-CM

## 2024-04-09 DIAGNOSIS — D72.829 ELEVATED WHITE BLOOD CELL COUNT, UNSPECIFIED: ICD-10-CM

## 2024-04-09 DIAGNOSIS — K76.82 HEPATIC ENCEPHALOPATHY: ICD-10-CM

## 2024-04-09 DIAGNOSIS — K70.11 ALCOHOLIC HEPATITIS WITH ASCITES: ICD-10-CM

## 2024-04-09 DIAGNOSIS — D53.9 NUTRITIONAL ANEMIA, UNSPECIFIED: ICD-10-CM

## 2024-04-09 LAB
ALBUMIN SERPL-MCNC: 2.8 G/DL (ref 3.5–5.2)
ALBUMIN/GLOB SERPL: 0.8 G/DL
ALP SERPL-CCNC: 180 U/L (ref 39–117)
ALT SERPL W P-5'-P-CCNC: 53 U/L (ref 1–33)
ANION GAP SERPL CALCULATED.3IONS-SCNC: 9 MMOL/L (ref 5–15)
ANISOCYTOSIS BLD QL: ABNORMAL
ARTERIAL PATENCY WRIST A: POSITIVE
AST SERPL-CCNC: 94 U/L (ref 1–32)
ATMOSPHERIC PRESS: ABNORMAL MM[HG]
BASE EXCESS BLDA CALC-SCNC: -6.3 MMOL/L (ref 0–3)
BDY SITE: ABNORMAL
BILIRUB SERPL-MCNC: 5.7 MG/DL (ref 0–1.2)
BUN SERPL-MCNC: 44 MG/DL (ref 8–23)
BUN/CREAT SERPL: 44.4 (ref 7–25)
CALCIUM SPEC-SCNC: 9.8 MG/DL (ref 8.6–10.5)
CHLORIDE SERPL-SCNC: 111 MMOL/L (ref 98–107)
CO2 BLDA-SCNC: 21 MMOL/L (ref 22–29)
CO2 SERPL-SCNC: 19 MMOL/L (ref 22–29)
CREAT SERPL-MCNC: 0.99 MG/DL (ref 0.57–1)
DEPRECATED RDW RBC AUTO: 92.8 FL (ref 37–54)
EGFRCR SERPLBLD CKD-EPI 2021: 64.6 ML/MIN/1.73
ERYTHROCYTE [DISTWIDTH] IN BLOOD BY AUTOMATED COUNT: 23.4 % (ref 12.3–15.4)
GLOBULIN UR ELPH-MCNC: 3.6 GM/DL
GLUCOSE BLDC GLUCOMTR-MCNC: 116 MG/DL (ref 74–100)
GLUCOSE SERPL-MCNC: 130 MG/DL (ref 65–99)
HCO3 BLDA-SCNC: 19.8 MMOL/L (ref 21–28)
HCT VFR BLD AUTO: 30.5 % (ref 34–46.6)
HEMODILUTION: NO
HGB BLD-MCNC: 9.1 G/DL (ref 12–15.9)
INHALED O2 CONCENTRATION: 100 %
INR PPP: 1.8 (ref 0.93–1.1)
LARGE PLATELETS: ABNORMAL
LYMPHOCYTES # BLD MANUAL: 0.26 10*3/MM3 (ref 0.7–3.1)
LYMPHOCYTES NFR BLD MANUAL: 21 % (ref 5–12)
MACROCYTES BLD QL SMEAR: ABNORMAL
MCH RBC QN AUTO: 32 PG (ref 26.6–33)
MCHC RBC AUTO-ENTMCNC: 29.8 G/DL (ref 31.5–35.7)
MCV RBC AUTO: 107.4 FL (ref 79–97)
MODALITY: ABNORMAL
MONOCYTES # BLD: 2.77 10*3/MM3 (ref 0.1–0.9)
MYELOCYTES NFR BLD MANUAL: 2 % (ref 0–0)
NEUTROPHILS # BLD AUTO: 9.88 10*3/MM3 (ref 1.7–7)
NEUTROPHILS NFR BLD MANUAL: 74 % (ref 42.7–76)
NEUTS BAND NFR BLD MANUAL: 1 % (ref 0–5)
NEUTS VAC BLD QL SMEAR: ABNORMAL
PCO2 BLDA: 40.2 MM HG (ref 35–48)
PH BLDA: 7.3 PH UNITS (ref 7.35–7.45)
PLATELET # BLD AUTO: 167 10*3/MM3 (ref 140–450)
PMV BLD AUTO: 10.9 FL (ref 6–12)
PO2 BLDA: 64.2 MM HG (ref 83–108)
POIKILOCYTOSIS BLD QL SMEAR: ABNORMAL
POTASSIUM SERPL-SCNC: 5.4 MMOL/L (ref 3.5–5.2)
PROT SERPL-MCNC: 6.4 G/DL (ref 6–8.5)
PROTHROMBIN TIME: 18.8 SECONDS (ref 9.6–11.7)
RBC # BLD AUTO: 2.84 10*6/MM3 (ref 3.77–5.28)
SAO2 % BLDCOA: 89.8 % (ref 94–98)
SCAN SLIDE: NORMAL
SMALL PLATELETS BLD QL SMEAR: ABNORMAL
SODIUM SERPL-SCNC: 139 MMOL/L (ref 136–145)
TOXIC GRANULATION: ABNORMAL
VARIANT LYMPHS NFR BLD MANUAL: 2 % (ref 19.6–45.3)
WBC NRBC COR # BLD AUTO: 13.17 10*3/MM3 (ref 3.4–10.8)

## 2024-04-09 PROCEDURE — 85610 PROTHROMBIN TIME: CPT | Performed by: NURSE PRACTITIONER

## 2024-04-09 PROCEDURE — 25510000001 IOPAMIDOL PER 1 ML: Performed by: INTERNAL MEDICINE

## 2024-04-09 PROCEDURE — 25810000003 LACTATED RINGERS PER 1000 ML: Performed by: STUDENT IN AN ORGANIZED HEALTH CARE EDUCATION/TRAINING PROGRAM

## 2024-04-09 PROCEDURE — 94761 N-INVAS EAR/PLS OXIMETRY MLT: CPT

## 2024-04-09 PROCEDURE — 63710000001 PREDNISOLONE PER 5 MG: Performed by: INTERNAL MEDICINE

## 2024-04-09 PROCEDURE — 99222 1ST HOSP IP/OBS MODERATE 55: CPT | Performed by: SURGERY

## 2024-04-09 PROCEDURE — 74018 RADEX ABDOMEN 1 VIEW: CPT

## 2024-04-09 PROCEDURE — 25010000002 LORAZEPAM PER 2 MG: Performed by: INTERNAL MEDICINE

## 2024-04-09 PROCEDURE — 82803 BLOOD GASES ANY COMBINATION: CPT | Performed by: INTERNAL MEDICINE

## 2024-04-09 PROCEDURE — 82948 REAGENT STRIP/BLOOD GLUCOSE: CPT

## 2024-04-09 PROCEDURE — 85007 BL SMEAR W/DIFF WBC COUNT: CPT | Performed by: NURSE PRACTITIONER

## 2024-04-09 PROCEDURE — 99232 SBSQ HOSP IP/OBS MODERATE 35: CPT | Performed by: NURSE PRACTITIONER

## 2024-04-09 PROCEDURE — 80053 COMPREHEN METABOLIC PANEL: CPT | Performed by: STUDENT IN AN ORGANIZED HEALTH CARE EDUCATION/TRAINING PROGRAM

## 2024-04-09 PROCEDURE — 94799 UNLISTED PULMONARY SVC/PX: CPT

## 2024-04-09 PROCEDURE — 25010000002 THIAMINE PER 100 MG: Performed by: INTERNAL MEDICINE

## 2024-04-09 PROCEDURE — 36600 WITHDRAWAL OF ARTERIAL BLOOD: CPT | Performed by: INTERNAL MEDICINE

## 2024-04-09 PROCEDURE — 25010000002 CEFTRIAXONE PER 250 MG: Performed by: NURSE PRACTITIONER

## 2024-04-09 PROCEDURE — 74250 X-RAY XM SM INT 1CNTRST STD: CPT

## 2024-04-09 PROCEDURE — 85025 COMPLETE CBC W/AUTO DIFF WBC: CPT | Performed by: NURSE PRACTITIONER

## 2024-04-09 RX ORDER — LORAZEPAM 2 MG/ML
0.5 INJECTION INTRAMUSCULAR
Status: DISCONTINUED | OUTPATIENT
Start: 2024-04-09 | End: 2024-04-10 | Stop reason: HOSPADM

## 2024-04-09 RX ORDER — LORAZEPAM 2 MG/ML
0.5 CONCENTRATE ORAL
Status: DISCONTINUED | OUTPATIENT
Start: 2024-04-09 | End: 2024-04-10 | Stop reason: HOSPADM

## 2024-04-09 RX ORDER — ACETAMINOPHEN 650 MG/1
650 SUPPOSITORY RECTAL EVERY 4 HOURS PRN
Status: DISCONTINUED | OUTPATIENT
Start: 2024-04-09 | End: 2024-04-10 | Stop reason: HOSPADM

## 2024-04-09 RX ORDER — LORAZEPAM 1 MG/1
1 TABLET ORAL
Status: DISCONTINUED | OUTPATIENT
Start: 2024-04-09 | End: 2024-04-10 | Stop reason: HOSPADM

## 2024-04-09 RX ORDER — LORAZEPAM 2 MG/ML
1 CONCENTRATE ORAL
Status: DISCONTINUED | OUTPATIENT
Start: 2024-04-09 | End: 2024-04-10 | Stop reason: HOSPADM

## 2024-04-09 RX ORDER — GLYCOPYRROLATE 0.2 MG/ML
0.2 INJECTION INTRAMUSCULAR; INTRAVENOUS
Status: DISCONTINUED | OUTPATIENT
Start: 2024-04-09 | End: 2024-04-10 | Stop reason: HOSPADM

## 2024-04-09 RX ORDER — LORAZEPAM 0.5 MG/1
0.5 TABLET ORAL
Status: DISCONTINUED | OUTPATIENT
Start: 2024-04-09 | End: 2024-04-10 | Stop reason: HOSPADM

## 2024-04-09 RX ORDER — ACETAMINOPHEN 160 MG/5ML
650 SOLUTION ORAL EVERY 4 HOURS PRN
Status: DISCONTINUED | OUTPATIENT
Start: 2024-04-09 | End: 2024-04-10 | Stop reason: HOSPADM

## 2024-04-09 RX ORDER — LORAZEPAM 2 MG/ML
2 CONCENTRATE ORAL
Status: DISCONTINUED | OUTPATIENT
Start: 2024-04-09 | End: 2024-04-10 | Stop reason: HOSPADM

## 2024-04-09 RX ORDER — LORAZEPAM 2 MG/ML
1 INJECTION INTRAMUSCULAR EVERY 4 HOURS PRN
Status: DISCONTINUED | OUTPATIENT
Start: 2024-04-09 | End: 2024-04-09 | Stop reason: SDUPTHER

## 2024-04-09 RX ORDER — RISPERIDONE 0.5 MG/1
0.5 TABLET, ORALLY DISINTEGRATING ORAL NIGHTLY
Status: DISCONTINUED | OUTPATIENT
Start: 2024-04-09 | End: 2024-04-09

## 2024-04-09 RX ORDER — LORAZEPAM 2 MG/ML
2 INJECTION INTRAMUSCULAR
Status: DISCONTINUED | OUTPATIENT
Start: 2024-04-09 | End: 2024-04-10 | Stop reason: HOSPADM

## 2024-04-09 RX ORDER — ERYTHROMYCIN ETHYLSUCCINATE 200 MG/5ML
200 SUSPENSION ORAL
Status: DISCONTINUED | OUTPATIENT
Start: 2024-04-09 | End: 2024-04-09

## 2024-04-09 RX ORDER — GLYCOPYRROLATE 0.2 MG/ML
0.4 INJECTION INTRAMUSCULAR; INTRAVENOUS
Status: DISCONTINUED | OUTPATIENT
Start: 2024-04-09 | End: 2024-04-10 | Stop reason: HOSPADM

## 2024-04-09 RX ORDER — MORPHINE SULFATE 2 MG/ML
2 INJECTION, SOLUTION INTRAMUSCULAR; INTRAVENOUS
Status: DISCONTINUED | OUTPATIENT
Start: 2024-04-09 | End: 2024-04-10 | Stop reason: HOSPADM

## 2024-04-09 RX ORDER — LORAZEPAM 2 MG/ML
1 INJECTION INTRAMUSCULAR
Status: DISCONTINUED | OUTPATIENT
Start: 2024-04-09 | End: 2024-04-10 | Stop reason: HOSPADM

## 2024-04-09 RX ORDER — CARBOXYMETHYLCELLULOSE SODIUM 10 MG/ML
1 GEL OPHTHALMIC
Status: DISCONTINUED | OUTPATIENT
Start: 2024-04-09 | End: 2024-04-10 | Stop reason: HOSPADM

## 2024-04-09 RX ORDER — LORAZEPAM 2 MG/ML
1 INJECTION INTRAMUSCULAR EVERY 4 HOURS PRN
Status: DISCONTINUED | OUTPATIENT
Start: 2024-04-09 | End: 2024-04-10 | Stop reason: HOSPADM

## 2024-04-09 RX ORDER — ACETAMINOPHEN 325 MG/1
650 TABLET ORAL EVERY 4 HOURS PRN
Status: DISCONTINUED | OUTPATIENT
Start: 2024-04-09 | End: 2024-04-10 | Stop reason: HOSPADM

## 2024-04-09 RX ORDER — LORAZEPAM 1 MG/1
2 TABLET ORAL
Status: DISCONTINUED | OUTPATIENT
Start: 2024-04-09 | End: 2024-04-10 | Stop reason: HOSPADM

## 2024-04-09 RX ADMIN — PREDNISOLONE SODIUM PHOSPHATE 40 MG: 15 SOLUTION ORAL at 11:05

## 2024-04-09 RX ADMIN — LANSOPRAZOLE 30 MG: 30 TABLET, ORALLY DISINTEGRATING ORAL at 08:21

## 2024-04-09 RX ADMIN — THIAMINE HYDROCHLORIDE 200 MG: 100 INJECTION, SOLUTION INTRAMUSCULAR; INTRAVENOUS at 05:01

## 2024-04-09 RX ADMIN — Medication 10 ML: at 20:03

## 2024-04-09 RX ADMIN — SPIRONOLACTONE 12.5 MG: 25 TABLET ORAL at 11:06

## 2024-04-09 RX ADMIN — Medication 1 TABLET: at 11:06

## 2024-04-09 RX ADMIN — LACTULOSE 20 G: 20 SOLUTION ORAL at 11:05

## 2024-04-09 RX ADMIN — RIFAXIMIN 550 MG: 550 TABLET ORAL at 11:06

## 2024-04-09 RX ADMIN — FOLIC ACID 1 MG: 1 TABLET ORAL at 11:06

## 2024-04-09 RX ADMIN — LORAZEPAM 1 MG: 2 INJECTION INTRAMUSCULAR; INTRAVENOUS at 17:25

## 2024-04-09 RX ADMIN — CEFTRIAXONE 1000 MG: 1 INJECTION, POWDER, FOR SOLUTION INTRAMUSCULAR; INTRAVENOUS at 13:47

## 2024-04-09 RX ADMIN — Medication 10 ML: at 11:07

## 2024-04-09 RX ADMIN — Medication 220 MG: at 11:07

## 2024-04-09 RX ADMIN — FUROSEMIDE 20 MG: 20 TABLET ORAL at 11:06

## 2024-04-09 RX ADMIN — Medication 300 MG: at 11:05

## 2024-04-09 RX ADMIN — SODIUM CHLORIDE, POTASSIUM CHLORIDE, SODIUM LACTATE AND CALCIUM CHLORIDE 100 ML/HR: 600; 310; 30; 20 INJECTION, SOLUTION INTRAVENOUS at 21:39

## 2024-04-09 RX ADMIN — THIAMINE HYDROCHLORIDE 200 MG: 100 INJECTION, SOLUTION INTRAMUSCULAR; INTRAVENOUS at 13:47

## 2024-04-09 RX ADMIN — IOPAMIDOL 200 ML: 755 INJECTION, SOLUTION INTRAVENOUS at 13:00

## 2024-04-09 NOTE — PROGRESS NOTES
Nutrition Services    Patient Name: Nelda Alcocer  YOB: 1960  MRN: [id number]  Admission date: 4/3/2024    PROGRESS NOTE      Encounter Information: Checking on for TF tolerance. Pt transitioned to bolus feeds 4/8 r/t patient continually dislodging DHT. Pt tolerating bolus feeds via [place] per GI note 4/9. Palliative care following with patient, discussions about rehab vs hospice, no decisions made at this time. SLP following with patient - pt with mild oral dysphagia, recommend full liquid diet at this time with supplemental TF for primary nourishment per note 4/8 PM. Will continue to monitor.       PO Diet: Diet: Liquid, Cardiac; Full Liquid; Low Sodium (2g); Fluid Consistency: Thin (IDDSI 0)   PO Supplements: No supplement ordered   PO Intake:  0% PO intake x last 24 hours, refused breakfast 4/8       Current nutrition support: Novasource Renal 120 mL bolus 6 times a day per NGT + 10 mL FWF before and after each bolus    Nutrition support review: Bolus feeds infusing as ordered per EMR       Labs (reviewed below): Hyperkalemia  Management per attending        GI Function:  + BM 4/5        Nutrition Intervention Updates: Increase enteral prescription to: Novasource Renal 145 mL bolus 6 times a day via NGT + 10 mL FWF before and after each bolus     These bolus feeds will provide 1740 kcals, 80 g protein, and 754 mL fluid. (All within range of estimated needs)    Continue Cardiac full liquid diet as tolerated.    Will continue to monitor.       Results from last 7 days   Lab Units 04/09/24  0033 04/08/24  0524 04/07/24  0505   SODIUM mmol/L 139 140 139   POTASSIUM mmol/L 5.4* 5.0 4.5   CHLORIDE mmol/L 111* 112* 110*   CO2 mmol/L 19.0* 19.0* 18.0*   BUN mg/dL 44* 42* 36*   CREATININE mg/dL 0.99 1.01* 1.02*   CALCIUM mg/dL 9.8 9.5 9.4   BILIRUBIN mg/dL 5.7* 5.3* 6.0*   ALK PHOS U/L 180* 132* 160*   ALT (SGPT) U/L 53* 45* 38*   AST (SGOT) U/L 94* 88* 88*   GLUCOSE mg/dL 130* 122* 128*     Results  "from last 7 days   Lab Units 04/09/24  0033 04/08/24  0524 04/07/24  0505 04/04/24  0214 04/04/24  0054   MAGNESIUM mg/dL  --  1.8 1.7  --  1.9   PHOSPHORUS mg/dL  --  3.8 3.5   < >  --    HEMOGLOBIN g/dL 9.1*  --  9.0*   < >  --    HEMATOCRIT % 30.5*  --  28.6*   < >  --     < > = values in this interval not displayed.     COVID19   Date Value Ref Range Status   04/03/2024 Not Detected Not Detected - Ref. Range Final     No results found for: \"HGBA1C\"    RD to follow up per protocol.    Electronically signed by:  Emma Terry  04/09/24 10:06 EDT    "

## 2024-04-09 NOTE — SIGNIFICANT NOTE
04/09/24 1337   OTHER   Discipline physical therapist   Rehab Time/Intention   Session Not Performed unable to treat, medical status change  (Pt potentially d/c home with Hospice. PT will follow-up tomorrow.)   Therapy Assessment/Plan (PT)   Criteria for Skilled Interventions Met (PT) yes;meets criteria   Recommendation   PT - Next Appointment 04/10/24

## 2024-04-09 NOTE — SIGNIFICANT NOTE
04/09/24 1545   OTHER   Discipline occupational therapist   Rehab Time/Intention   Session Not Performed unable to treat, medical status change;other (see comments)  (Pt possibly discharging home with hospice. Will f/u tomorrow on pt's status if still here.)   Therapy Assessment/Plan (PT)   Criteria for Skilled Interventions Met (PT) yes;meets criteria   Recommendation   OT - Next Appointment 04/10/24

## 2024-04-09 NOTE — CONSULTS
GENERAL SURGERY CONSULT    Referring Provider: Bill  Reason for Consultation: Pneumatosis    Patient Care Team:  Kathy Hayes APRN as PCP - General (Nurse Practitioner)    Chief complaint none given due to mental status changes    Subjective .     History of present illness:  63 yo lady with alcoholic cirrhosis who has been in the hospital since 4/3/2024.  She has had what appears to be a significant ileus. She underwent a SBFT today that showed pneumatosis in some loops of small bowel which prompted my consultation.  Currently due to her mental status she can give me no information.  Her abdomen is distended.  An NGT was placed with brown/tan output.  Her daughters are at bedside.      She appears to have significant cirrhosis with ascites and portal hypertension. She has been treated and followed by the GI service for this.    She was seen by palliative care today, her family is trying to determine goals of care and she has previously been a DNR apparently.    She is currently on a nonrebreather with O2 sats in the 80s or low 90s.    Review of Systems    Review of Systems   Unable to perform ROS: Mental status change         History  Past Medical History:   Diagnosis Date    Ascites     COPD (chronic obstructive pulmonary disease)     H/O blood clots     left leg    Liver disease     Multiple sclerosis     Vertigo    ,   Past Surgical History:   Procedure Laterality Date    HAND SURGERY Right     HYSTERECTOMY     ,   Family History   Problem Relation Age of Onset    Peripheral vascular disease Mother    ,   Social History     Tobacco Use    Smoking status: Every Day     Current packs/day: 1.50     Average packs/day: 1.5 packs/day for 35.0 years (52.5 ttl pk-yrs)     Types: Cigarettes    Smokeless tobacco: Never   Vaping Use    Vaping status: Never Used   Substance Use Topics    Alcohol use: Yes     Alcohol/week: 77.0 standard drinks of alcohol     Types: 77 Shots of liquor per week     Comment: Vodka and  Fireball    Drug use: Never   ,   Medications Prior to Admission   Medication Sig Dispense Refill Last Dose    albuterol sulfate  (90 Base) MCG/ACT inhaler Inhale 2 puffs Every 4 (Four) Hours As Needed for Wheezing or Shortness of Air. 18 g 0     furosemide (LASIX) 40 MG tablet Take 1 tablet by mouth Daily.       spironolactone (ALDACTONE) 100 MG tablet Take 1 tablet by mouth Daily.      , Scheduled Meds:  albumin human, 37.5 g, Intravenous, Once   Or  albumin human, 50 g, Intravenous, Once   Or  albumin human, 62.5 g, Intravenous, Once   Or  albumin human, 75 g, Intravenous, Once   Or  albumin human, 87.5 g, Intravenous, Once   Or  albumin human, 100 g, Intravenous, Once   Or  albumin human, 112.5 g, Intravenous, Once  cefTRIAXone, 1,000 mg, Intravenous, Q24H  erythromycin ethylsuccinate, 200 mg, Oral, 4x Daily With Meals & Nightly  Ferrous Sulfate, 300 mg, Per G Tube, Daily  folic acid, 1 mg, Per G Tube, Daily  furosemide, 20 mg, Per G Tube, Daily  lactulose, 20 g, Per G Tube, BID  lansoprazole, 30 mg, Per G Tube, QAM AC  lidocaine PF 1%, 10 mL, Infiltration, Once  multivitamin pediatric chewable, 1 tablet, Per G Tube, Daily  prednisoLONE sodium phosphate, 40 mg, Per G Tube, Daily  rifAXIMin, 550 mg, Per G Tube, Q12H  risperiDONE, 0.5 mg, Oral, Nightly  sodium chloride, 10 mL, Intravenous, Q12H  spironolactone, 12.5 mg, Per G Tube, Daily  thiamine (B-1) IV, 200 mg, Intravenous, Q8H   Followed by  [START ON 4/11/2024] thiamine, 100 mg, Per G Tube, Daily  zinc sulfate, 220 mg, Per G Tube, Daily    , Continuous Infusions:  lactated ringers, 100 mL/hr, Last Rate: 100 mL/hr (04/08/24 2127)    , PRN Meds:    guaifenesin    LORazepam    Magnesium Standard Dose Replacement - Follow Nurse / BPA Driven Protocol    ondansetron ODT **OR** ondansetron    [COMPLETED] Insert Peripheral IV **AND** sodium chloride    sodium chloride    sodium chloride and Allergies:  Patient has no known allergies.    Objective     Vital  Signs   Temp:  [97.1 °F (36.2 °C)-97.3 °F (36.3 °C)] 97.3 °F (36.3 °C)  Heart Rate:  [] 102  Resp:  [16-20] 16  BP: (133-140)/(77-81) 133/78    Physical Exam:       General Appearance:    Frail, ill appearing cachectic lady   Head:    Normocephalic, without obvious abnormality, atraumatic   Eyes:            Opens eyes, scleral icterus   Ears:    Ears appear intact with no abnormalities noted   Throat:   No oral lesions, no thrush, oral mucosa moist   Neck:   No adenopathy, supple, trachea midline, no thyromegaly, no   carotid bruit, no JVD   Lungs:     Breathing labored on nonrebreather mask   Abdomen:     Distended, firm, midline hernia   Extremities:   thin   Skin:   jaundiced   Neurologic:   Cannot assess       Results Review:  Lab Results (last 72 hours)       Procedure Component Value Units Date/Time    Blood Gas, Arterial - [[id number]]  (Abnormal) Collected: 04/09/24 1834    Specimen: Arterial Blood Updated: 04/09/24 1838     Site Right Radial     Carl's Test Positive     pH, Arterial 7.299 pH units      pCO2, Arterial 40.2 mm Hg      pO2, Arterial 64.2 mm Hg      HCO3, Arterial 19.8 mmol/L      Base Excess, Arterial -6.3 mmol/L      Comment: Serial Number: [id number]:  [id number]        O2 Saturation, Arterial 89.8 %      CO2 Content 21.0 mmol/L      Barometric Pressure for Blood Gas --     Comment: N/A        Modality NRB     FIO2 100 %      Hemodilution No    POC Glucose Once [[id number]]  (Abnormal) Collected: 04/09/24 1834    Specimen: Arterial Blood Updated: 04/09/24 1838     Glucose 116 mg/dL      Comment: Serial Number: [id number]:  [id number]       CBC & Differential [[id number]]  (Abnormal) Collected: 04/09/24 0033    Specimen: Blood Updated: 04/09/24 0320    Narrative:      The following orders were created for panel order CBC & Differential.  Procedure                               Abnormality         Status                     ---------                               -----------         ------                      CBC Auto Differential[[id number]]        Abnormal            Final result               Scan Slide[[id number]]                                       Final result                 Please view results for these tests on the individual orders.    CBC Auto Differential [[id number]]  (Abnormal) Collected: 04/09/24 0033    Specimen: Blood Updated: 04/09/24 0320     WBC 13.17 10*3/mm3      RBC 2.84 10*6/mm3      Hemoglobin 9.1 g/dL      Hematocrit 30.5 %      .4 fL      MCH 32.0 pg      MCHC 29.8 g/dL      RDW 23.4 %      RDW-SD 92.8 fl      MPV 10.9 fL      Platelets 167 10*3/mm3     Narrative:      The previously reported component NRBC is no longer being reported. Previous result was 0.0 /100 WBC (Reference Range: 0.0-0.2 /100 WBC) on 4/9/2024 at [facility] EDT.    Scan Slide [[id number]] Collected: 04/09/24 0033    Specimen: Blood Updated: 04/09/24 0320     Scan Slide --     Comment: See Manual Differential Results       Manual Differential [[id number]]  (Abnormal) Collected: 04/09/24 0033    Specimen: Blood Updated: 04/09/24 0320     Neutrophil % 74.0 %      Lymphocyte % 2.0 %      Monocyte % 21.0 %      Bands %  1.0 %      Myelocyte % 2.0 %      Neutrophils Absolute 9.88 10*3/mm3      Lymphocytes Absolute 0.26 10*3/mm3      Monocytes Absolute 2.77 10*3/mm3      Anisocytosis Slight/1+     Macrocytes Slight/1+     Poikilocytes Slight/1+     Toxic Granulation Slight/1+     Vacuolated Neutrophils Slight/1+     Platelet Estimate Decreased     Large Platelets Slight/1+    Protime-INR [[id number]]  (Abnormal) Collected: 04/09/24 0126    Specimen: Blood Updated: 04/09/24 0150     Protime 18.8 Seconds      INR 1.80    Comprehensive Metabolic Panel [[id number]]  (Abnormal) Collected: 04/09/24 0033    Specimen: Blood Updated: 04/09/24 0136     Glucose 130 mg/dL      BUN 44 mg/dL      Creatinine 0.99 mg/dL      Sodium 139 mmol/L      Potassium 5.4 mmol/L      Comment: Slight hemolysis detected by analyzer. Result may  be falsely elevated.        Chloride 111 mmol/L      CO2 19.0 mmol/L      Calcium 9.8 mg/dL      Total Protein 6.4 g/dL      Albumin 2.8 g/dL      ALT (SGPT) 53 U/L      AST (SGOT) 94 U/L      Comment: Slight hemolysis detected by analyzer. Result may be falsely elevated.        Alkaline Phosphatase 180 U/L      Total Bilirubin 5.7 mg/dL      Globulin 3.6 gm/dL      A/G Ratio 0.8 g/dL      BUN/Creatinine Ratio 44.4     Anion Gap 9.0 mmol/L      eGFR 64.6 mL/min/1.73     Narrative:      GFR Normal >60  Chronic Kidney Disease <60  Kidney Failure <15      Blood Culture - Blood, Arm, Right [[id number]]  (Normal) Collected: 04/03/24 1400    Specimen: Blood from Arm, Right Updated: 04/08/24 1415     Blood Culture No growth at 5 days    Blood Culture - Blood, Arm, Left [[id number]]  (Normal) Collected: 04/03/24 1359    Specimen: Blood from Arm, Left Updated: 04/08/24 1415     Blood Culture No growth at 5 days    POC Glucose Once [[id number]]  (Abnormal) Collected: 04/08/24 0711    Specimen: Blood Updated: 04/08/24 0714     Glucose 115 mg/dL      Comment: Serial Number: [id number]:  [id number]       Comprehensive Metabolic Panel [[id number]]  (Abnormal) Collected: 04/08/24 0524    Specimen: Blood Updated: 04/08/24 0616     Glucose 122 mg/dL      BUN 42 mg/dL      Creatinine 1.01 mg/dL      Sodium 140 mmol/L      Potassium 5.0 mmol/L      Comment: Slight hemolysis detected by analyzer. Result may be falsely elevated.        Chloride 112 mmol/L      CO2 19.0 mmol/L      Calcium 9.5 mg/dL      Total Protein 5.9 g/dL      Albumin 2.6 g/dL      ALT (SGPT) 45 U/L      AST (SGOT) 88 U/L      Alkaline Phosphatase 132 U/L      Total Bilirubin 5.3 mg/dL      Globulin 3.3 gm/dL      A/G Ratio 0.8 g/dL      BUN/Creatinine Ratio 41.6     Anion Gap 9.0 mmol/L      eGFR 63.1 mL/min/1.73     Narrative:      GFR Normal >60  Chronic Kidney Disease <60  Kidney Failure <15      Magnesium [[id number]]  (Normal) Collected: 04/08/24 0524     Specimen: Blood Updated: 04/08/24 0616     Magnesium 1.8 mg/dL     Phosphorus [[id number]]  (Normal) Collected: 04/08/24 0524    Specimen: Blood Updated: 04/08/24 0611     Phosphorus 3.8 mg/dL     Magnesium [[id number]]  (Normal) Collected: 04/07/24 0505    Specimen: Blood Updated: 04/07/24 0614     Magnesium 1.7 mg/dL     Comprehensive Metabolic Panel [[id number]]  (Abnormal) Collected: 04/07/24 0505    Specimen: Blood Updated: 04/07/24 0614     Glucose 128 mg/dL      BUN 36 mg/dL      Creatinine 1.02 mg/dL      Sodium 139 mmol/L      Potassium 4.5 mmol/L      Chloride 110 mmol/L      CO2 18.0 mmol/L      Calcium 9.4 mg/dL      Total Protein 6.3 g/dL      Albumin 2.9 g/dL      ALT (SGPT) 38 U/L      AST (SGOT) 88 U/L      Alkaline Phosphatase 160 U/L      Total Bilirubin 6.0 mg/dL      Globulin 3.4 gm/dL      A/G Ratio 0.9 g/dL      BUN/Creatinine Ratio 35.3     Anion Gap 11.0 mmol/L      eGFR 62.3 mL/min/1.73     Narrative:      GFR Normal >60  Chronic Kidney Disease <60  Kidney Failure <15      Phosphorus [[id number]]  (Normal) Collected: 04/07/24 0505    Specimen: Blood Updated: 04/07/24 0613     Phosphorus 3.5 mg/dL     CBC & Differential [[id number]]  (Abnormal) Collected: 04/07/24 0505    Specimen: Blood Updated: 04/07/24 0603    Narrative:      The following orders were created for panel order CBC & Differential.  Procedure                               Abnormality         Status                     ---------                               -----------         ------                     CBC Auto Differential[[id number]]        Abnormal            Final result               Scan Slide[[id number]]                                                                    Please view results for these tests on the individual orders.    CBC Auto Differential [[id number]]  (Abnormal) Collected: 04/07/24 0505    Specimen: Blood Updated: 04/07/24 0603     WBC 15.19 10*3/mm3      RBC 2.68 10*6/mm3      Hemoglobin 9.0 g/dL       Hematocrit 28.6 %      .7 fL      MCH 33.6 pg      MCHC 31.5 g/dL      RDW 24.6 %      RDW-SD 95.4 fl      MPV 11.0 fL      Platelets 142 10*3/mm3      Neutrophil % 75.6 %      Lymphocyte % 5.4 %      Monocyte % 17.0 %      Eosinophil % 0.1 %      Basophil % 0.3 %      Immature Grans % 1.6 %      Neutrophils, Absolute 11.50 10*3/mm3      Lymphocytes, Absolute 0.82 10*3/mm3      Monocytes, Absolute 2.58 10*3/mm3      Eosinophils, Absolute 0.01 10*3/mm3      Basophils, Absolute 0.04 10*3/mm3      Immature Grans, Absolute 0.24 10*3/mm3      nRBC 0.0 /100 WBC           Imaging Results (Last 72 Hours)       Procedure Component Value Units Date/Time    XR Abdomen KUB [[id number]] Resulted: 04/09/24 1938     Updated: 04/09/24 1938    FL Small Bowel Follow Through Single-Contrast [[id number]] Collected: 04/09/24 1831     Updated: 04/09/24 1845    Narrative:      FL SMALL BOWEL FOLLOW THROUGH SINGLE-CONTRAST    Date of Exam: 4/9/2024 6:02 PM EDT    Indication: abnormal KUB suggesting SBO.    Comparison: Noncontrast CT of the abdomen and pelvis performed on April 7, 2024    Technique:   image of the abdomen was obtained. Patient ingested medium density Gastrografin for single contrast imaging of the small bowel.  Examination was recorded with multiple large field of view radiographs. Spot fluoroscopic images were not   obtained.    Fluoroscopic Time: None    Number of Images: 10    Findings:  At 4 hours of 45 minutes, contrast did not pass into the colon. Gastric tube is in appropriate position. Post administration of contrast, there is reflux into the esophagus. Markedly dilated small and large bowel loops are visualized. There is evidence   of pneumatosis of the small bowel loops in the left mid and lower abdomen. No portal venous gas is visualized. No free intraperitoneal air is visualized.      Impression:      Impression:    Markedly dilated small and large bowel loops may be secondary to severe ileus. Oral  contrast did not pass into the colon at 4 hours 45 minutes. There is evidence of pneumatosis in the small bowel loops in the left mid and lower abdomen.    Findings relayed to KONRAD Mijares on April 9, 2024 at 6:43 p.m. by telephone.          Electronically Signed: Keo Wheat MD    4/9/2024 6:43 PM EDT    Workstation ID: [id number]    XR Abdomen KUB [[id number]] Collected: 04/09/24 1026     Updated: 04/09/24 1057    Narrative:      XR ABDOMEN KUB    Date of Exam: 4/9/2024 10:18 AM EDT    Indication: worsening abdominal distention    Comparison: CT 4/7/2024.    Findings:  Persistent diffuse contiguous small bowel distention concerning for component of obstruction, likely similar to recent CT. There is no overt pneumoperitoneum.      Impression:      Impression:  Findings remain concerning for small bowel obstruction.      Electronically Signed: Yair Jacob MD    4/9/2024 10:55 AM EDT    Workstation ID: [id number]    CT Abdomen Pelvis Without Contrast [[id number]] Collected: 04/08/24 0725     Updated: 04/08/24 0756    Narrative:      CT ABDOMEN PELVIS WO CONTRAST    Date of Exam: 4/7/2024 8:25 PM EDT    Indication: Bowel obstruction suspected.    Comparison: CT of the abdomen and pelvis 4/3/2024    Technique: Axial CT images were obtained of the abdomen and pelvis without the administration of contrast. Sagittal and coronal reconstructions were performed.  Automated exposure control and iterative reconstruction methods were used.      Findings:  LUNG BASES: There is a layering right pleural effusion with compressive atelectasis of the right lower lobe.    LIVER: Diffuse patchy heterogeneous appearance of the liver with nodular contour again noted.  BILIARY/GALLBLADDER: Small calcified stones are present in the gallbladder.  SPLEEN:  Unremarkable  PANCREAS:  Unremarkable  ADRENAL:  Unremarkable  KIDNEYS:  Unremarkable parenchyma with no solid mass identified. No obstruction.  No calculus  identified.  GASTROINTESTINAL/MESENTERY: An enteric tube is present with the tip in the stomach. Multiple dilated fluid-filled small bowel loops are present. Additionally, there is colonic dilatation from the cecum through the splenic flexure.  AORTA/IVC: There is moderate to marked aortic atherosclerotic disease. There is no aneurysm.    RETROPERITONEUM/LYMPH NODES: Increased abdominopelvic ascites compared to the previous study.    REPRODUCTIVE: Hysterectomy.  BLADDER:  Unremarkable    There is a left inguinal hernia containing omental fat and ascites.    OSSEUS STRUCTURES:  Typical for age with no acute process identified.      Impression:      Impression:    1. Dilated small and large bowel loops to the level of the splenic flecture. Findings may represent ileus versus partial obstruction. Clinical correlation and close follow-up are recommended.  2. Cirrhosis with increased ascites.  3. Layering right pleural effusion and compressive atelectasis of the right lower lobe.  4. Cholelithiasis.      Findings were communicated to MICHAELLE Swift and Dr. Khan on 4/8/2024 at 7:50 a.m. eastern standard time.          Electronically Signed: Zenia Price MD    4/8/2024 7:54 AM EDT    Workstation ID: [id number]    XR Abdomen KUB [[id number]] Collected: 04/07/24 1913     Updated: 04/07/24 1917    Narrative:      XR ABDOMEN KUB    Date of Exam: 4/7/2024 6:10 PM EDT    Indication: NG placement    Comparison: 4/6/2024    Findings:  Esophagogastric tube tip overlies the proximal stomach. Air-filled dilated bowel loops again partially imaged overlying the upper abdomen.      Impression:      Impression:  Esophagogastric tube tip overlies the proximal stomach.    Gas-distended bowel loops overlying upper abdomen again could relate to ileus or obstruction, partially imaged.      Electronically Signed: Giovani Ohara MD    4/7/2024 7:14 PM EDT    Workstation ID: [id number]            I reviewed the patient's new clinical  results.  I reviewed the patient's new imaging results and agree with the interpretation.  I reviewed the patient's other test results and agree with the interpretation      Assessment & Plan       Hepatic encephalopathy    Altered mental status, unspecified altered mental status type    Severe malnutrition      63 yo lady with cirrhosis, ascites, apparent ileus with small bowel pneumatosis.    I had a long discussion with the patient's daughters regarding her overall status.  She appears to be very ill due multiple medical issues.  She currently does not appear to have a perforation of the GI tract but does have apparent pneumatosis.  It is unclear if she has ischemia or joaquin necrosis of this bowel, but I discussed with the family that I would not anticipate that she would survive surgical exploration. After further discussion they appear to want to pursue comfort measures only.  Will defer this to primary service.  She should continue NGT decompression and likely need antibiotics for coverage of enteric contents.    I discussed the patient's findings and my recommendations with patient, family, and nursing staff              This document has been electronically signed by Shreyas Theodore MD on April 9, 2024 19:48 EDT      Shreyas Theodore MD  04/09/24  19:48 EDT

## 2024-04-09 NOTE — PROGRESS NOTES
[facility] MEDICINE SERVICE  DAILY PROGRESS NOTE    NAME: Nelda Alcocer  : 1960  MRN: [id number]      LOS: 6 days     PROVIDER OF SERVICE: Nakul Khan MD    Chief Complaint: Hepatic encephalopathy    Subjective:         Subjective       Chief Complaint: worsening confusion      History of Present Illness: Nelda Alcocer is a 64 y.o. female with a CMH of multiple sclerosis, alcohol use with liver cirrhosis and tobacco abuse DVT lower extremity presented with poor oral intake and progressively worsening confusion.  Patient lives with her  and consents alcohol daily.  Per report patient has not removed from her chair in the last 3 days  was concerned and called EMS     In the ER patient was tachycardic in the range of 125 blood pressure 117/60 and saturating 99% on room air lab was significant for mildly elevated troponin of 19 which subsequently was 18; chloride of 108, bicarb of 16 and anion gap of 17.  Creatinine was elevated 1.12 with elevated BUN/creatinine ratio.  Alkaline phosphatase was 167 and AST was also elevated.  Total bilirubin was 10.8.  Lactic acid was 3 subsequently 3.4.  PT and INR were elevated 17.6 and 1.68 white cell count was 15.2 with neutrophil predominance.  Hemoglobin noted for 7.1 as compared to 12 with anemia  Microcytic features urinalysis was suggestive of infection with nitrate positive and WBC of 6-10 per microscopic field     Chest x-ray showed no acute process; CT of the head without contrast brain atrophy with microvascular ischemic changes and no other significant acute intracranial abnormality.  CT abdomen pelvis contrast showed liver cirrhosis with patchy heterogeneous liver parenchyma suggestive of hepatocellular disease or multicentric liver lesions recommended MRI.  Mild amount of ascites was stable mildly enlarged periportal and retroperitoneal lymph nodes.  Cholelithiasis was also noted     Interval History:    24-Patient is  seen and examined at bedside while still in the ED, she was laying comfortably in bed, jaundiced appearing awake and alert but not oriented.  She is able to answer yes or no to some questions but unable to follow conversations.  Other than hypotension she is stable  4/5/24 patient seen and examined in bed no acute distress, blood pressure stable, heart rate 111.Pt would benefit from SNF for continued PT at discharge.   4/6/24 patient seen and examined in bed no acute distress, discussed with RN, she is more lethargic today.  Will order ABG and ammonia level.  Family at bedside, long discussion with the daughter regarding advance directive living will.  Will consult palliative.  4/7/24 patient seen and examined in bed no acute distress vital signs blood pressure stable, heart rate 116.  She is more responsive today, discussed with RN.  4/8/24 patient seen and examined in medical distress, blood pressure stable, heart rate 109.  Patient is more alert today she is oriented place time and person.  Discussed with RN.  4/9/24 seen in bed NAD, fatigue, weakness, reports worsening abdominal distention today. KUB ordered,  Has had nausea, but no vomiting.  No bowel movements overnight.   Objective:     Vital Signs  Temp:  [97.1 °F (36.2 °C)-97.7 °F (36.5 °C)] 97.3 °F (36.3 °C)  Heart Rate:  [] 102  Resp:  [15-20] 16  BP: (133-147)/(77-81) 133/78  Flow (L/min):  [6] 6   Body mass index is 20.46 kg/m².    Physical Exam  Appearance: No apparent distress, non-toxic appearing   HEENT/Neck: Neck is supple, Extraocular movements intact,   Cardiovascular: Regular Rate and Rhythm, No murmurs, gallops or rubs   Pulmonary: Clear to auscultation Bilaterally.   Abdomen: BS+, Soft, non-tender, non-distended.   Ext: No Cyanosis, Clubbing, Edema.  Neuro: Non-focal, Alert & awake, confused     Scheduled Meds   albumin human, 37.5 g, Intravenous, Once   Or  albumin human, 50 g, Intravenous, Once   Or  albumin human, 62.5 g, Intravenous,  Once   Or  albumin human, 75 g, Intravenous, Once   Or  albumin human, 87.5 g, Intravenous, Once   Or  albumin human, 100 g, Intravenous, Once   Or  albumin human, 112.5 g, Intravenous, Once  aluminum sulfate-calcium acetate 1:20, 1,000 mL, Topical, Q8H  cefTRIAXone, 1,000 mg, Intravenous, Q24H  Ferrous Sulfate, 300 mg, Per G Tube, Daily  folic acid, 1 mg, Per G Tube, Daily  furosemide, 20 mg, Per G Tube, Daily  lactulose, 20 g, Per G Tube, BID  lansoprazole, 30 mg, Per G Tube, QAM AC  lidocaine PF 1%, 10 mL, Infiltration, Once  multivitamin pediatric chewable, 1 tablet, Per G Tube, Daily  prednisoLONE sodium phosphate, 40 mg, Per G Tube, Daily  rifAXIMin, 550 mg, Per G Tube, Q12H  sodium chloride, 10 mL, Intravenous, Q12H  spironolactone, 12.5 mg, Per G Tube, Daily  thiamine (B-1) IV, 200 mg, Intravenous, Q8H   Followed by  [START ON 4/11/2024] thiamine, 100 mg, Per G Tube, Daily  zinc sulfate, 220 mg, Per G Tube, Daily       PRN Meds     guaifenesin    Magnesium Standard Dose Replacement - Follow Nurse / BPA Driven Protocol    ondansetron ODT **OR** ondansetron    [COMPLETED] Insert Peripheral IV **AND** sodium chloride    sodium chloride    sodium chloride   Infusions  lactated ringers, 100 mL/hr, Last Rate: 100 mL/hr (04/08/24 2127)          Diagnostic Data    Results from last 7 days   Lab Units 04/09/24  0033   WBC 10*3/mm3 13.17*   HEMOGLOBIN g/dL 9.1*   HEMATOCRIT % 30.5*   PLATELETS 10*3/mm3 167   GLUCOSE mg/dL 130*   CREATININE mg/dL 0.99   BUN mg/dL 44*   SODIUM mmol/L 139   POTASSIUM mmol/L 5.4*   AST (SGOT) U/L 94*   ALT (SGPT) U/L 53*   ALK PHOS U/L 180*   BILIRUBIN mg/dL 5.7*   ANION GAP mmol/L 9.0       XR Abdomen KUB    Result Date: 4/9/2024  Impression: Findings remain concerning for small bowel obstruction. Electronically Signed: Yair Jacob MD  4/9/2024 10:55 AM EDT  Workstation ID: [id number]    CT Abdomen Pelvis Without Contrast    Result Date: 4/8/2024  Impression: 1. Dilated small and large  bowel loops to the level of the splenic flecture. Findings may represent ileus versus partial obstruction. Clinical correlation and close follow-up are recommended. 2. Cirrhosis with increased ascites. 3. Layering right pleural effusion and compressive atelectasis of the right lower lobe. 4. Cholelithiasis. Findings were communicated to MICHAELLE Swift and Dr. Khan on 4/8/2024 at 7:50 a.m. eastern standard time. Electronically Signed: Zenia Price MD  4/8/2024 7:54 AM EDT  Workstation ID: [id number]    XR Abdomen KUB    Result Date: 4/7/2024  Impression: Esophagogastric tube tip overlies the proximal stomach. Gas-distended bowel loops overlying upper abdomen again could relate to ileus or obstruction, partially imaged. Electronically Signed: Giovani Ohara MD  4/7/2024 7:14 PM EDT  Workstation ID: [id number]       I reviewed the patient's new clinical results.    Assessment/Plan:     Active and Resolved Problems  Active Hospital Problems    Diagnosis  POA    **Hepatic encephalopathy [K76.82]  Yes    Severe malnutrition [E43]  Yes    Altered mental status, unspecified altered mental status type [R41.82]  Yes      Resolved Hospital Problems   No resolved problems to display.     Hepatic encephalopathy with altered mental status-improving  Alcoholic liver disease  Worsening ascites  Alcohol withdrawal  -Patient presented with hepatic encephalopathy in the settings of cirrhosis.  -She had a discriminant factor of 36 on presentation and she was started on prednisolone.  -Gastroenterology following, appreciate recommendations.>recommending Dobbhoff tube feeding. Will monitor liver enzymes. Continue lactulose and Xifaxan. Continue prednisolone zinc and multivitamin. Continue spironolactone and furosemide and monitor creatinine   -Continue lactulose and titrate for 3-4 bowel movements in a day.  -will monitor patient for signs of withdrawal, try to limit benzodiazepine use.      Acute kidney injury  High anion gap  metabolic acidosis  Lactic acidosis  Hypothyroidism  -She was placed on maintenance volume resuscitation, will give 1 L bolus and recheck blood pressure.  -Kidney function mildly improved, anion gap closed, still has a bicarbonate of 17.  -Trend lactic acid until clearance.    UTI with cystitis-100,000 CFU/mL Escherichia coli     Continue ceftriaxone, no growth on urine cultures so far.     Anemia  -She is status post 1 unit packed red blood cell transfusion for hemoglobin of 6.1.  Repeat hemoglobin pending, transfuse for hemoglobin less than 7.  -Platelet 102, continue to monitor.  -Check iron, folate and B12 level.     DVT prophylaxis:SCD  Full code-care planning, greater than 30 minutes    Continue inpatient level of care.  Plan discussed with patient and nurse  Pt would benefit from SNF for continued PT at discharge.     Signature: Electronically signed by Nakul Khan MD, 04/09/24, 13:06 EDT.  [facility] Hospitalist Team

## 2024-04-09 NOTE — CASE MANAGEMENT/SOCIAL WORK
Continued Stay Note   [facility]     Patient Name: Nelda Alcocer  MRN: [id number]  Today's Date: 4/9/2024    Admit Date: 4/3/2024    Plan: Anticipate home with family and [facility]. PASRR approved, if needed.   Discharge Plan       Row Name 04/09/24 1532       Plan    Plan Anticipate home with family and [facility]. PASRR approved, if needed.    Plan Comments CM received update from [facility] liaison Yessy who reported that patient’s family plans to have patient come home with hospice services at d/c. CM provided update to [facility] liaison Megan Paez RN     Office Phone: [phone]  Office Cell: [phone]

## 2024-04-09 NOTE — PLAN OF CARE
Goal Outcome Evaluation:      Family remains at bedside, labs drawn, palliative consult pending, message left for  to see patient in the am, ext cath placed on patient to better count I/O's and keep urine away from areas of redness, continues on 6L HFNC, LR at 100ml/hr continues, no c/o pain soa or n/v at this time, abd distended with paracentesis attempted with no result on 4/8, liquid diet ordered, iv abt's ordered q [id number] days total.

## 2024-04-09 NOTE — PROGRESS NOTES
LOS: 6 days   Patient Care Team:  Kathy Hayes APRN as PCP - General (Nurse Practitioner)      Subjective     Interval History:     Subjective: Patient complains of worsening abdominal distention.  Denies any bowel movements overnight.  Has been having some nausea, but denies vomiting.  Has been tolerating bolus feeds via Dobbhoff without difficulty.      ROS:   No chest pain, shortness of breath, or cough.        Medication Review:     Current Facility-Administered Medications:     albumin human 25 % IV SOLN 37.5 g, 37.5 g, Intravenous, Once **OR** albumin human 25 % IV SOLN 50 g, 50 g, Intravenous, Once **OR** albumin human 25 % IV SOLN 62.5 g, 62.5 g, Intravenous, Once **OR** albumin human 25 % IV SOLN 75 g, 75 g, Intravenous, Once **OR** albumin human 25 % IV SOLN 87.5 g, 87.5 g, Intravenous, Once **OR** albumin human 25 % IV SOLN 100 g, 100 g, Intravenous, Once **OR** albumin human 25 % IV SOLN 112.5 g, 112.5 g, Intravenous, Once, Tish Jiang APRN    aluminum sulfate-calcium acetate 1:20 (D[place]OR[place]) topical astringent solution 1,000 mL, 1,000 mL, Topical, Q8H, Matthew Kaye MD, 1,000 mL at 04/08/24 2128    cefTRIAXone (ROCEPHIN) 1,000 mg in sodium chloride 0.9 % 100 mL MBP, 1,000 mg, Intravenous, Q24H, Tish Jiang APRN, Last Rate: 200 mL/hr at 04/08/24 1241, 1,000 mg at 04/08/24 1241    Ferrous Sulfate 300 (60 Fe) MG/5ML solution 300 mg, 300 mg, Per G Tube, Daily, Nakul Khan MD    folic acid (FOLVITE) tablet 1 mg, 1 mg, Per G Tube, Daily, Nakul Khan MD    furosemide (LASIX) tablet 20 mg, 20 mg, Per G Tube, Daily, Nakul Khan MD    guaifenesin (ROBITUSSIN) 100 MG/5ML liquid 200 mg, 200 mg, Oral, Q4H PRN, Miguel Murguia MD, 200 mg at 04/05/24 0510    lactated ringers infusion, 100 mL/hr, Intravenous, Continuous, Prosper Dawson MD, Last Rate: 100 mL/hr at 04/08/24 2127, 100 mL/hr at 04/08/24 2127    lactulose (CHRONULAC) 10 GM/15ML solution 20 g, 20 g, Per G Tube,  BID, Nakul Khan MD, 20 g at 04/08/24 2127    lansoprazole (PREVACID SOLUTAB) disintegrating tablet Tablet Delayed Release Dispersible 30 mg, 30 mg, Per G Tube, QAM AC, Nakul Khan MD    lidocaine PF 1% (XYLOCAINE) injection 10 mL, 10 mL, Infiltration, Once, Elen Rizvi APRN    Magnesium Standard Dose Replacement - Follow Nurse / BPA Driven Protocol, , Does not apply, PRN, Prosper Dawson MD    multivitamin pediatric chewable tablet 1 tablet, 1 tablet, Per G Tube, Daily, Nakul Khan MD    ondansetron ODT (ZOFRAN-ODT) disintegrating tablet 4 mg, 4 mg, Oral, Q6H PRN **OR** ondansetron (ZOFRAN) injection 4 mg, 4 mg, Intravenous, Q6H PRN, Prosper Dawson MD, 4 mg at 04/05/24 0202    prednisoLONE sodium phosphate (ORAPRED) 15 MG/5ML oral solution 40 mg, 40 mg, Per G Tube, Daily, Nakul Khan MD    riFAXIMin (XIFAXAN) tablet 550 mg, 550 mg, Per G Tube, Q12H, Nakul Khan MD, 550 mg at 04/08/24 2126    [COMPLETED] Insert Peripheral IV, , , Once **AND** sodium chloride 0.9 % flush 10 mL, 10 mL, Intravenous, PRN, Karissa Marsh APRN    sodium chloride 0.9 % flush 10 mL, 10 mL, Intravenous, Q12H, Prosper Dawson MD, 10 mL at 04/08/24 2128    sodium chloride 0.9 % flush 10 mL, 10 mL, Intravenous, PRN, Prosper Dawson MD    sodium chloride 0.9 % infusion 40 mL, 40 mL, Intravenous, PRN, Prosper Dawson MD    spironolactone (ALDACTONE) tablet 12.5 mg, 12.5 mg, Per G Tube, Daily, Nakul Khan MD    [COMPLETED] thiamine (B-1) 500 mg in sodium chloride 0.9 % 100 mL IVPB, 500 mg, Intravenous, Q8H, Last Rate: 200 mL/hr at 04/06/24 1510, 500 mg at 04/06/24 1510 **FOLLOWED BY** thiamine (B-1) injection 200 mg, 200 mg, Intravenous, Q8H, 200 mg at 04/09/24 0501 **FOLLOWED BY** [START ON 4/11/2024] thiamine (VITAMIN B-1) tablet 100 mg, 100 mg, Per G Tube, Daily, Nakul Khan MD    zinc sulfate (ZINCATE) capsule 220 mg, 220 mg, Per G Tube, Daily, Nakul Khan,  MD      Objective     Vital Signs  Vitals:    04/08/24 2213 04/09/24 0120 04/09/24 0500 04/09/24 0615   BP: 136/77 140/81  136/77   BP Location: Right arm Right arm  Right arm   Patient Position: Lying Lying  Lying   Pulse: 108 99  108   Resp: 17 16  20   Temp: 97.2 °F (36.2 °C) 97.1 °F (36.2 °C)  97.1 °F (36.2 °C)   TempSrc: Oral Oral  Oral   SpO2: 90% 90%  90%   Weight:   52.4 kg (115 lb 8.3 oz)    Height:           Physical Exam:     General Appearance:    Awake and alert, in no acute distress   Head:    Normocephalic, without obvious abnormality   Eyes:          Conjunctivae normal, anicteric sclera   Throat:   No oral lesions, no thrush, oral mucosa moist   Neck:   No adenopathy, supple, no JVD   Lungs:     respirations regular, even and unlabored   Abdomen:     Soft, mild generalized tenderness, no rebound or guarding, moderate distention   Rectal:     Deferred   Extremities:   No edema, no cyanosis   Skin:   No bruising or rash, no jaundice        Results Review:    CBC    Results from last 7 days   Lab Units 04/09/24  0033 04/07/24  0505 04/06/24  0512 04/05/24  0024 04/04/24  0837 04/04/24  0214 04/03/24  1359   WBC 10*3/mm3 13.17* 15.19* 13.78* 10.82* 9.46 10.83* 15.52*   HEMOGLOBIN g/dL 9.1* 9.0* 8.8* 7.5* 7.6* 6.1* 7.1*   PLATELETS 10*3/mm3 167 142 107* 87* 61* 102* 85*     CMP   Results from last 7 days   Lab Units 04/09/24  0033 04/08/24  0524 04/07/24  0505 04/06/24  1022 04/06/24  0512 04/05/24  0024 04/04/24  0054 04/03/24  1405 04/03/24  1359   SODIUM mmol/L 139 140 139  --  139 139 143  --  141   POTASSIUM mmol/L 5.4* 5.0 4.5  --  4.6 4.0 3.9  --  4.8   CHLORIDE mmol/L 111* 112* 110*  --  110* 111* 114*  --  108*   CO2 mmol/L 19.0* 19.0* 18.0*  --  16.0* 17.0* 17.0*  --  16.0*   BUN mg/dL 44* 42* 36*  --  33* 29* 31*  --  36*   CREATININE mg/dL 0.99 1.01* 1.02*  --  1.09* 0.94 1.01*  --  1.12*   GLUCOSE mg/dL 130* 122* 128*  --  97 106* 142*  --  119*   ALBUMIN g/dL 2.8* 2.6* 2.9*  --  2.8* 2.7*  "2.8*  --  3.3*   BILIRUBIN mg/dL 5.7* 5.3* 6.0*  --  6.1* 6.4* 9.4*  --  10.8*   ALK PHOS U/L 180* 132* 160*  --  133* 156* 166*  --  167*   AST (SGOT) U/L 94* 88* 88*  --  80* 52* 60*  --  79*   ALT (SGPT) U/L 53* 45* 38*  --  31 25 26  --  31   MAGNESIUM mg/dL  --  1.8 1.7  --   --   --  1.9  --  2.1   PHOSPHORUS mg/dL  --  3.8 3.5  --   --   --   --   --   --    LIPASE U/L  --   --   --   --   --   --   --   --  149*   AMMONIA umol/L  --   --   --  54*  --  41  --  131*  --      Cr Clearance Estimated Creatinine Clearance: 48.7 mL/min (by C-G formula based on SCr of 0.99 mg/dL).  Coag   Results from last 7 days   Lab Units 04/09/24  0126 04/06/24  0512 04/05/24  0024 04/04/24  0054 04/03/24  1359   INR  1.80* 1.47* 1.62* 1.85* 1.68*     HbA1C No results found for: \"HGBA1C\"  Results from last 7 days   Lab Units 04/03/24  1612 04/03/24  1359   CK TOTAL U/L  --  25   HSTROP T ng/L 18* 19*     Infection   Results from last 7 days   Lab Units 04/03/24  1551 04/03/24  1400 04/03/24  1359   BLOODCX   --  No growth at 5 days No growth at 5 days   URINECX  >100,000 CFU/mL Escherichia coli*  --   --      UA    Results from last 7 days   Lab Units 04/03/24  1551   NITRITE UA  Positive*   WBC UA /HPF 6-10*   BACTERIA UA /HPF 4+*   SQUAM EPITHEL UA /HPF 7-12*   URINECX  >100,000 CFU/mL Escherichia coli*     Microbiology Results (last 10 days)       Procedure Component Value - Date/Time    Urine Culture - Urine, Straight Cath [[id number]]  (Abnormal)  (Susceptibility) Collected: 04/03/24 1557    Lab Status: Final result Specimen: Urine from Straight Cath Updated: 04/04/24 8407     Urine Culture >100,000 CFU/mL Escherichia coli    Narrative:      Colonization of the urinary tract without infection is common. Treatment is discouraged unless the patient is symptomatic, pregnant, or undergoing an invasive urologic procedure.    Susceptibility        Escherichia coli      JENNIFER      Amoxicillin + Clavulanate Susceptible      " Ampicillin Susceptible      Ampicillin + Sulbactam Susceptible      Cefazolin Susceptible      Cefepime Susceptible      Ceftazidime Susceptible      Ceftriaxone Susceptible      Gentamicin Susceptible      Levofloxacin Susceptible      Nitrofurantoin Susceptible      Piperacillin + Tazobactam Susceptible      Trimethoprim + Sulfamethoxazole Susceptible                           Respiratory Panel PCR w/COVID-19(SARS-CoV-2) [facility]KA/[facility] In-House, NP Swab in UTM/VTM, 2 HR TAT - Swab, Nasopharynx [[id number]]  (Normal) Collected: 04/03/24 1407    Lab Status: Final result Specimen: Swab from Nasopharynx Updated: 04/03/24 1501     ADENOVIRUS, PCR Not Detected     Coronavirus 229E Not Detected     Coronavirus HKU1 Not Detected     Coronavirus NL63 Not Detected     Coronavirus OC43 Not Detected     COVID19 Not Detected     Human Metapneumovirus Not Detected     Human Rhinovirus/Enterovirus Not Detected     Influenza A PCR Not Detected     Influenza B PCR Not Detected     Parainfluenza Virus 1 Not Detected     Parainfluenza Virus 2 Not Detected     Parainfluenza Virus 3 Not Detected     Parainfluenza Virus 4 Not Detected     RSV, PCR Not Detected     Bordetella pertussis pcr Not Detected     Bordetella parapertussis PCR Not Detected     Chlamydophila pneumoniae PCR Not Detected     Mycoplasma pneumo by PCR Not Detected    Narrative:      In the setting of a positive respiratory panel with a viral infection PLUS a negative procalcitonin without other underlying concern for bacterial infection, consider observing off antibiotics or discontinuation of antibiotics and continue supportive care. If the respiratory panel is positive for atypical bacterial infection (Bordetella pertussis, Chlamydophila pneumoniae, or Mycoplasma pneumoniae), consider antibiotic de-escalation to target atypical bacterial infection.    Blood Culture - Blood, Arm, Right [[id number]]  (Normal) Collected: 04/03/24 1400    Lab Status: Final  result Specimen: Blood from Arm, Right Updated: 04/08/24 1415     Blood Culture No growth at 5 days    Blood Culture - Blood, Arm, Left [[id number]]  (Normal) Collected: 04/03/24 1359    Lab Status: Final result Specimen: Blood from Arm, Left Updated: 04/08/24 1415     Blood Culture No growth at 5 days          Imaging Results (Last 72 Hours)       Procedure Component Value Units Date/Time    CT Abdomen Pelvis Without Contrast [[id number]] Collected: 04/08/24 0725     Updated: 04/08/24 0756    Narrative:      CT ABDOMEN PELVIS WO CONTRAST    Date of Exam: 4/7/2024 8:25 PM EDT    Indication: Bowel obstruction suspected.    Comparison: CT of the abdomen and pelvis 4/3/2024    Technique: Axial CT images were obtained of the abdomen and pelvis without the administration of contrast. Sagittal and coronal reconstructions were performed.  Automated exposure control and iterative reconstruction methods were used.      Findings:  LUNG BASES: There is a layering right pleural effusion with compressive atelectasis of the right lower lobe.    LIVER: Diffuse patchy heterogeneous appearance of the liver with nodular contour again noted.  BILIARY/GALLBLADDER: Small calcified stones are present in the gallbladder.  SPLEEN:  Unremarkable  PANCREAS:  Unremarkable  ADRENAL:  Unremarkable  KIDNEYS:  Unremarkable parenchyma with no solid mass identified. No obstruction.  No calculus identified.  GASTROINTESTINAL/MESENTERY: An enteric tube is present with the tip in the stomach. Multiple dilated fluid-filled small bowel loops are present. Additionally, there is colonic dilatation from the cecum through the splenic flexure.  AORTA/IVC: There is moderate to marked aortic atherosclerotic disease. There is no aneurysm.    RETROPERITONEUM/LYMPH NODES: Increased abdominopelvic ascites compared to the previous study.    REPRODUCTIVE: Hysterectomy.  BLADDER:  Unremarkable    There is a left inguinal hernia containing omental fat and  ascites.    OSSEUS STRUCTURES:  Typical for age with no acute process identified.      Impression:      Impression:    1. Dilated small and large bowel loops to the level of the splenic flecture. Findings may represent ileus versus partial obstruction. Clinical correlation and close follow-up are recommended.  2. Cirrhosis with increased ascites.  3. Layering right pleural effusion and compressive atelectasis of the right lower lobe.  4. Cholelithiasis.      Findings were communicated to MICHAELLE Swift and Dr. Khan on 4/8/2024 at 7:50 a.m. eastern standard time.          Electronically Signed: Zenia Price MD    4/8/2024 7:54 AM EDT    Workstation ID: [id number]    XR Abdomen KUB [[id number]] Collected: 04/07/24 1913     Updated: 04/07/24 1917    Narrative:      XR ABDOMEN KUB    Date of Exam: 4/7/2024 6:10 PM EDT    Indication: NG placement    Comparison: 4/6/2024    Findings:  Esophagogastric tube tip overlies the proximal stomach. Air-filled dilated bowel loops again partially imaged overlying the upper abdomen.      Impression:      Impression:  Esophagogastric tube tip overlies the proximal stomach.    Gas-distended bowel loops overlying upper abdomen again could relate to ileus or obstruction, partially imaged.      Electronically Signed: Giovani Ohara MD    4/7/2024 7:14 PM EDT    Workstation ID: [id number]    XR Abdomen KUB [[id number]] Collected: 04/06/24 1342     Updated: 04/06/24 1347    Narrative:        XR ABDOMEN KUB    Date of Exam: 4/6/2024 1:39 PM EDT    Indication: tube feed placement varification    Comparison: April 3, 2024    Findings:  Enteric tube terminates in the left upper quadrant, with tip likely in the mid stomach.    There is gaseous distention of bowel loops. There is possible dilatation of central small bowel loops. There is some suspected gas in the colon as well.      Impression:      Impression:  Enteric tube terminates in the left upper quadrant, with tip likely in the mid  stomach.    Gas distended bowel loops with possible dilatation of central small bowel loops. Correlate for ileus or obstruction.    Electronically Signed: Dontae Hartapollo    4/6/2024 1:45 PM EDT    Workstation ID: [id number]            Assessment & Plan     ASSESSMENT:  -Alcohol hepatitis/cirrhosis  -Cirrhosis complicated by ascites and nonbleeding esophageal varices  -Abnormal CT liver   -Hepatic encephalopathy  -Macrocytic anemia  -Leukocytosis  -UTI  -Cholelithiasis  -COPD  -MS  -History of DVT  -Alcohol abuse     PLAN:  Patient is a 64-year-old female with history of EtOH cirrhosis and continued EtOH use who presented on 4/3 with altered mental status and increasing weakness and inability to care for herself at home.  CT abdomen/pelvis W on 4/3 showed cirrhosis with patchy heterogenicity of the liver parenchyma which could be due to diffuse hepatocellular disease or multicentric liver lesions.  AFP normal.  Could consider outpatient MRI liver mass protocol.     Patient reports worsening abdominal distention today.  Has had nausea, but no vomiting.  No bowel movements overnight.  CT abdomen/pelvis WO on 4/7 shows dilated small and large bowel loops to the level of the splenic flexure, cirrhosis with increased ascites, layering right pleural effusion, and cholelithiasis.   Plan repeat KUB today.  Paracentesis not performed yesterday as patient reports that there was not a pocket of fluid big enough for drainage.  Continue empiric Rocephin.    Total bilirubin 5.7 from 5.3, alk phos 180 from 132, AST 94 from 88, and ALT 53 from 45.  INR 1.8 from 1.47.  Creatinine normal at 0.99.  Continue lactulose, Xifaxan, PPI, prednisolone, zinc, and multivitamin.  Prednisolone started on 4/3.  Will calculate Lille score on 4/10.   Recommend complete EtOH cessation.  Diet as tolerated.  Continue tube feeds per Dobbhoff tube per dietitian recommendations.  If oral intake remains poor, could consider EGD with NJ tube placement and  tenisha.  Continue supportive care.      Electronically signed by MICHAELLE Burns, 04/09/24, 8:02 AM EDT.

## 2024-04-10 VITALS
OXYGEN SATURATION: 88 % | HEART RATE: 92 BPM | HEIGHT: 63 IN | BODY MASS INDEX: 20.47 KG/M2 | RESPIRATION RATE: 11 BRPM | SYSTOLIC BLOOD PRESSURE: 92 MMHG | WEIGHT: 115.52 LBS | DIASTOLIC BLOOD PRESSURE: 48 MMHG | TEMPERATURE: 97.3 F

## 2024-04-10 PROCEDURE — 25010000002 MORPHINE PER 10 MG

## 2024-04-10 PROCEDURE — 25810000003 LACTATED RINGERS PER 1000 ML: Performed by: STUDENT IN AN ORGANIZED HEALTH CARE EDUCATION/TRAINING PROGRAM

## 2024-04-10 RX ORDER — MORPHINE SULFATE 100 MG/5ML
10 SOLUTION ORAL
Qty: 15 ML | Refills: 0 | Status: SHIPPED | OUTPATIENT
Start: 2024-04-10

## 2024-04-10 RX ORDER — LORAZEPAM 2 MG/ML
1 CONCENTRATE ORAL
Qty: 30 ML | Refills: 0 | Status: SHIPPED | OUTPATIENT
Start: 2024-04-10 | End: 2024-04-16

## 2024-04-10 RX ADMIN — MORPHINE SULFATE 2 MG: 2 INJECTION, SOLUTION INTRAMUSCULAR; INTRAVENOUS at 17:37

## 2024-04-10 RX ADMIN — SODIUM CHLORIDE, POTASSIUM CHLORIDE, SODIUM LACTATE AND CALCIUM CHLORIDE 100 ML/HR: 600; 310; 30; 20 INJECTION, SOLUTION INTRAVENOUS at 07:39

## 2024-04-10 NOTE — SIGNIFICANT NOTE
I was notified by nursing staff that the patient's family wanted to change her CODE STATUS to DNR/DNI.  Per report from nursing staff, general surgery had been consulted due to to evidence of pneumatosis on a small bowel follow-through.  Unfortunately, due to the patient's multiple chronic problems and respiratory failure she was not a candidate for surgery.  At the bedside, it was determined that the patient's children had requested the DNR/DNI status despite her  being NOK.  Following confirmation that the  was in agreement, her CODE STATUS was changed to comfort measures only.  Medications for pain, anxiety, and increased secretions were ordered while all other medications and testing were discontinued. [facility] has already been consulted per case management notes.

## 2024-04-10 NOTE — PROGRESS NOTES
Nutrition Services    Patient Name:  Nelda Alcocer  YOB: 1960  MRN: [id number]  Admit Date:  4/3/2024    Pt to proceed with comfort measures - code status changed to DNR/DNI. TF order discontinued, NG now in place to suction (100 mL output in past 24 hours). Will sign off at this time.    Electronically signed by:  Emma Terry  04/10/24 12:27 EDT

## 2024-04-10 NOTE — PROGRESS NOTES
[facility] MEDICINE SERVICE  DAILY PROGRESS NOTE    NAME: Nelda Alcocer  : 1960  MRN: [id number]      LOS: 7 days     PROVIDER OF SERVICE: Nakul Khan MD    Chief Complaint: Hepatic encephalopathy    Subjective:         Subjective       Chief Complaint: worsening confusion      History of Present Illness: Nelda Alcocer is a 64 y.o. female with a CMH of multiple sclerosis, alcohol use with liver cirrhosis and tobacco abuse DVT lower extremity presented with poor oral intake and progressively worsening confusion.  Patient lives with her  and consents alcohol daily.  Per report patient has not removed from her chair in the last 3 days  was concerned and called EMS     In the ER patient was tachycardic in the range of 125 blood pressure 117/60 and saturating 99% on room air lab was significant for mildly elevated troponin of 19 which subsequently was 18; chloride of 108, bicarb of 16 and anion gap of 17.  Creatinine was elevated 1.12 with elevated BUN/creatinine ratio.  Alkaline phosphatase was 167 and AST was also elevated.  Total bilirubin was 10.8.  Lactic acid was 3 subsequently 3.4.  PT and INR were elevated 17.6 and 1.68 white cell count was 15.2 with neutrophil predominance.  Hemoglobin noted for 7.1 as compared to 12 with anemia  Microcytic features urinalysis was suggestive of infection with nitrate positive and WBC of 6-10 per microscopic field     Chest x-ray showed no acute process; CT of the head without contrast brain atrophy with microvascular ischemic changes and no other significant acute intracranial abnormality.  CT abdomen pelvis contrast showed liver cirrhosis with patchy heterogeneous liver parenchyma suggestive of hepatocellular disease or multicentric liver lesions recommended MRI.  Mild amount of ascites was stable mildly enlarged periportal and retroperitoneal lymph nodes.  Cholelithiasis was also noted     Interval History:    24-Patient is  seen and examined at bedside while still in the ED, she was laying comfortably in bed, jaundiced appearing awake and alert but not oriented.  She is able to answer yes or no to some questions but unable to follow conversations.  Other than hypotension she is stable  4/5/24 patient seen and examined in bed no acute distress, blood pressure stable, heart rate 111.Pt would benefit from SNF for continued PT at discharge.   4/6/24 patient seen and examined in bed no acute distress, discussed with RN, she is more lethargic today.  Will order ABG and ammonia level.  Family at bedside, long discussion with the daughter regarding advance directive living will.  Will consult palliative.  4/7/24 patient seen and examined in bed no acute distress vital signs blood pressure stable, heart rate 116.  She is more responsive today, discussed with RN.  4/8/24 patient seen and examined in medical distress, blood pressure stable, heart rate 109.  Patient is more alert today she is oriented place time and person.  Discussed with RN.  4/9/24 seen in bed NAD, fatigue, weakness, reports worsening abdominal distention today. KUB ordered,  Has had nausea, but no vomiting.  No bowel movements overnight.   4/10/24 seen in bed NAD, family at bed side, DW RN, patient looks comfortable, was made DRN and comfort measures were started  Objective:     Vital Signs  Heart Rate:  [] 91  Resp:  [7-11] 7  BP: (139)/(87) 139/87  Flow (L/min):  [6-15] 15   Body mass index is 20.46 kg/m².    Physical Exam  Appearance: No apparent distress, non-toxic appearing   HEENT/Neck: Neck is supple, Extraocular movements intact,   Cardiovascular: Regular Rate and Rhythm, No murmurs, gallops or rubs   Pulmonary: Clear to auscultation Bilaterally.   Abdomen: BS+, Soft, non-tender, non-distended.   Ext: No Cyanosis, Clubbing, Edema.  Neuro: Non-focal, Alert & awake, confused     Scheduled Meds   sodium chloride, 10 mL, Intravenous, Q12H  thiamine (B-1) IV, 200 mg,  Intravenous, Q8H       PRN Meds     acetaminophen **OR** acetaminophen **OR** acetaminophen    carboxymethylcellulose sod    glycopyrrolate **OR** glycopyrrolate **OR** glycopyrrolate **OR** glycopyrrolate    guaifenesin    LORazepam **OR** LORazepam **OR** LORazepam **OR** LORazepam **OR** LORazepam **OR** LORazepam    LORazepam **OR** LORazepam **OR** LORazepam **OR** LORazepam **OR** LORazepam **OR** LORazepam    LORazepam **OR** LORazepam **OR** LORazepam **OR** LORazepam **OR** LORazepam **OR** LORazepam    LORazepam    Magnesium Standard Dose Replacement - Follow Nurse / BPA Driven Protocol    Morphine    ondansetron ODT **OR** ondansetron    [COMPLETED] Insert Peripheral IV **AND** sodium chloride    sodium chloride    sodium chloride   Infusions  lactated ringers, 100 mL/hr, Last Rate: 100 mL/hr (04/10/24 0739)          Diagnostic Data    Results from last 7 days   Lab Units 04/09/24  0033   WBC 10*3/mm3 13.17*   HEMOGLOBIN g/dL 9.1*   HEMATOCRIT % 30.5*   PLATELETS 10*3/mm3 167   GLUCOSE mg/dL 130*   CREATININE mg/dL 0.99   BUN mg/dL 44*   SODIUM mmol/L 139   POTASSIUM mmol/L 5.4*   AST (SGOT) U/L 94*   ALT (SGPT) U/L 53*   ALK PHOS U/L 180*   BILIRUBIN mg/dL 5.7*   ANION GAP mmol/L 9.0       XR Abdomen KUB    Result Date: 4/9/2024  Impression: Interval placement of nasogastric tube. The sideport is close to the gastroesophageal junction. Consider advancing 3 to 4 cm. Persistent findings for small bowel obstruction. Electronically Signed: Galo Rahman MD  4/9/2024 7:49 PM EDT  Workstation ID: [id number]    FL Small Bowel Follow Through Single-Contrast    Result Date: 4/9/2024  Impression: Markedly dilated small and large bowel loops may be secondary to severe ileus. Oral contrast did not pass into the colon at 4 hours 45 minutes. There is evidence of pneumatosis in the small bowel loops in the left mid and lower abdomen. Findings relayed to KONRAD Mijares on April 9, 2024 at 6:43 p.m. by telephone. Electronically  Signed: Keo Wheat MD  4/9/2024 6:43 PM EDT  Workstation ID: [id number]    XR Abdomen KUB    Result Date: 4/9/2024  Impression: Findings remain concerning for small bowel obstruction. Electronically Signed: Yair Jacob MD  4/9/2024 10:55 AM EDT  Workstation ID: [id number]       I reviewed the patient's new clinical results.    Assessment/Plan:     Active and Resolved Problems  Active Hospital Problems    Diagnosis  POA    **Hepatic encephalopathy [K76.82]  Yes    Severe malnutrition [E43]  Yes    Altered mental status, unspecified altered mental status type [R41.82]  Yes      Resolved Hospital Problems   No resolved problems to display.     Hepatic encephalopathy with altered mental status-improving  Alcoholic liver disease  Worsening ascites  Alcohol withdrawal  -Patient presented with hepatic encephalopathy in the settings of cirrhosis.  -She had a discriminant factor of 36 on presentation and she was started on prednisolone.  -Gastroenterology following, appreciate recommendations.>recommending Dobbhoff tube feeding. Will monitor liver enzymes. Continue lactulose and Xifaxan. Continue prednisolone zinc and multivitamin. Continue spironolactone and furosemide and monitor creatinine   -Continue lactulose and titrate for 3-4 bowel movements in a day.  -will monitor patient for signs of withdrawal, try to limit benzodiazepine use.      Acute kidney injury  High anion gap metabolic acidosis  Lactic acidosis  Hypothyroidism  -She was placed on maintenance volume resuscitation, will give 1 L bolus and recheck blood pressure.  -Kidney function mildly improved, anion gap closed, still has a bicarbonate of 17.  -Trend lactic acid until clearance.    UTI with cystitis-100,000 CFU/mL Escherichia coli     Received ceftriaxone, no growth on urine cultures so far.     Anemia  -She is status post 1 unit packed red blood cell transfusion for hemoglobin of 6.1.  Repeat hemoglobin pending, transfuse for hemoglobin less than  7.  -Platelet 102, continue to monitor.     DVT prophylaxis:SCD  DNR/DNI    Continue inpatient level of care.  Plan discussed with patient and nurse  Advance care planning, greater than 30 minutes, long discussion with family, they want to take patient home with hospice.    Signature: Electronically signed by Nakul Khan MD, 04/10/24, 11:07 EDT.  [facility] Hospitalist Team

## 2024-04-10 NOTE — CASE MANAGEMENT/SOCIAL WORK
Continued Stay Note   Jelani     Patient Name: Nelda Alcocer  MRN: [id number]  Today's Date: 4/10/2024    Admit Date: 4/3/2024    Plan: Anticipate home with family and [facility]. PASRR approved, if needed.   Discharge Plan       Row Name 04/10/24 1657       Plan    Patient/Family in Agreement with Plan yes    Plan Comments Discussed with [facility] liaison Yessy MANCUSO that patient on 15L O2 and she discussed dc planning with patient’s daughter, Elma. Plan is to arrange DME at Elma’s home ([address] [place] [address] [address] [place] [address]) and d/c today, 4/10. MD and RN updated. Received update from hospice that equipment was delivered and they can accommodate patient’s O2 needs. CM contacted MD to request out of hospital DNR form to be completed. CM met with patient’s spouse at bedside to discuss IMM letter and obtain signature on Out of Hospital DNR form. EMS medical necessity form completed and information packet provided to RN for EMS. [facility] transportation scheduled via ad hoc.    Final Discharge Disposition Code 50 - home with hospice    Final Note Home with [facility]                 Expected Discharge Date and Time       Expected Discharge Date Expected Discharge Time    Apr 10, 2024         Case Management Discharge Note  Final Note: Home with [facility]  Selected Continued Care - Admitted Since 4/3/2024       Home Medical Care Coordination complete.      Service Provider Selected Services Address Phone Fax Patient Preferred    [facility] [address] [place] [address] [phone]            Transportation Services  Ambulance: [facility]  Final Discharge Disposition Code: 50 - home with hospice    Mindy Paez RN     Office Phone: [phone]  Office Cell: [phone]

## 2024-04-10 NOTE — CASE MANAGEMENT/SOCIAL WORK
Social Work Assessment  [facility]     Patient Name: Nelda Alcocer  MRN: [id number]  Today's Date: 4/10/2024    Admit Date: 4/3/2024     Discharge Plan       Row Name 04/10/24 1640       Plan    Plan Comments LSW received call from APS representative, Ever Sumi ([phone]). Returned call with update on disposition, voicemail left 4/10 @ 4:38pm. Handoff noted about contacting Karen Lazaro ([phone]), which LSW contacted to update on disposition, voicemail left 4/10 @ 4:40pm.             NILE Aguayo, LSW, [facility]    Phone: [phone]  Cell: [phone]  Fax: [phone]  [email or web address]

## 2024-04-10 NOTE — NURSING NOTE
Summary note.  Following Abd x-ray, patient's respiratory status declined and she was placed on a 100% NRB with messages placed to hospitalist.   Call received from Radiologist informing me x-ray finding were very concerning and to inform primary MD immediately,  Dr. Khan informed with orders received to contact general surgeon and to place an NGT to low wall suction after d/c'ing dobhoff tube.    General surgeon Dr. Theodore arrived to evaluate patient and discussed situation with family.   Patient's two daughters were here at bedside and kept informed during events. A call was placed to patient's  and he was also updated.  Report given to oncoming nurse who was also present during NGT placement. surgeon's assessment and discussions with family.

## 2024-04-10 NOTE — NURSING NOTE
NGT removed, 2mg IV morphine given for comfort then PIV removed in preparation for transport to patient's daughters home where she will be attended by hospice personnel. Patient place on 100% NRB O2 mask per Ems personnel for transport.  Assisted to stretcher and discharge with ambulance transport.  Family at bedside during preparations and will meet ambulance at home.

## 2024-04-10 NOTE — CASE MANAGEMENT/SOCIAL WORK
"Physicians Statement of Medical Necessity for  Ambulance Transportation    GENERAL INFORMATION     Name: Nelda Alcocer  YOB: 1960  Medicare #: [id number]   Transport Date: 4/10/24 (Valid for round trips this date, or for scheduled repetitive trips for 60 days from the date signed below.)  Origin: [facility]  Destination: [address] [place] [address].   Is the Patient's stay covered under Medicare Part A (PPS/DRG?)Yes  Closest appropriate facility? Yes  If this a hosp-hosp transfer? No  Is this a hospice patient? Yes, Is this transport related to patient's terminal illness? Yes    MEDICAL NECESSITY QUESTIONAIRE    Ambulance Transportation is medically necessary only if other means of transportation are contraindicated or would be potentially harmful to the patient.  To meet this requirement, the patient must be either \"bed confined\" or suffer from a condition such that transport by means other than an ambulance is contraindicated by the patient's condition.  The following questions must be answered by the healthcare professional signing below for this form to be valid:     1) Describe the MEDICAL CONDITION (physical and/or mental) of this patient AT THE TIME OF AMBULANCE TRANSPORT that requires the patient to be transported in an ambulance, and why transport by other means is contraindicated by the patient's condition: Hepatic encephalopathy with altered mental status, home with [facility], 15L O2 unable to self administer.  Past Medical History:   Diagnosis Date    Ascites     COPD (chronic obstructive pulmonary disease)     H/O blood clots     left leg    Liver disease     Multiple sclerosis     Vertigo       Past Surgical History:   Procedure Laterality Date    HAND SURGERY Right     HYSTERECTOMY        2) Is this patient \"bed confined\" as defined below?Yes   To be \"bed confined\" the patient must satisfy all three of the following criteria: "  (1) unable to get up from bed without assistance; AND (2) unable to ambulate;  AND (3) unable to sit in a chair or wheelchair.  3) Can this patient safely be transported by car or wheelchair van (I.e., may safely sit during transport, without an attendant or monitoring?)No   4. In addition to completing questions 1-3 above, please check any of the following conditions that apply*:          *Note: supporting documentation for any boxes checked must be maintained in the patient's medical records Patient is confused, Moderate/severe pain on movement, Requires oxygen - unable to self administer, and Unable to tolerate seated position for time needed to transport      SIGNATURE OF PHYSICIAN OR OTHER AUTHORIZED HEALTHCARE PROFESSIONAL    I certify that the above information is true and correct based on my evaluation of this patient, and represent that the patient requires transport by ambulance and that other forms of transport are contraindicated.  I understand that this information will be used by the Centers for Medicare and Medicaid Services (CMS) to support the determiniation of medical necessity for ambulance services, and I represent that I have personal knowledge of the patient's condition at the time of transport.    MIGUEL Paez RN   If this box is checked, I also certify that the patient is physically or mentally incapable of signing the ambulance service's claim form and that the institution with which I am affiliated has furnished care, services or assistance to the patient.  My signature below is made on behalf of the patient pursuant to 42 .36(b)(4). In accordance with 42 .37, the specific reason(s) that the patient is physically or mentally incapable of signing the claim for is as follows: Dr. Khan     Signature of Physician or Healthcare Professional  Date/Time:   4/10/24     (For Scheduled repetitive transport, this form is not valid for transports performed more than 60 days after this  date).                                                                                                                                            --------------------------------------------------------------------------------------------  Printed Name and Credentials of Physician or Authorized Healthcare Professional     *Form must be signed by patient's attending physician for scheduled, repetitive transports,.  For non-repetitive ambulance transports, if unable to obtain the signature of the attending physician, any of the following may sign (please select below):     Physician  Clinical Nurse Specialist  Registered Nurse     Physician Assistant  Discharge Planner  Licensed Practical Nurse     Nurse Practitioner   X

## 2024-04-10 NOTE — PLAN OF CARE
Goal Outcome Evaluation:      Patient transitioned to comfort care only this shift, code status changed to DNR/DNI, family remains at bedside, orders obtained for comfort measures, currently sats in mid 80's on 15L Non-rebreather mask. Hospitality cart placed outside door for family.

## 2024-04-11 NOTE — DISCHARGE SUMMARY
[facility] Medicine Services  Discharge Summary    Date of Service:10/24  Patient Name: Nelda Alcocer  : 1960  MRN: [id number]    Date of Admission: 4/3/2024  Discharge Diagnosis: Hepatic encephalopathy with altered mental status  Date of Discharge:  4/10/24  Primary Care Physician: Kathy Hayes APRN      Presenting Problem:   Hepatic encephalopathy [K76.82]  Hyperammonemia [E72.20]  Thrombocytopenia [D69.6]  Alcoholic cirrhosis of liver with ascites [K70.31]  Urinary tract infection without hematuria, site unspecified [N39.0]  Altered mental status, unspecified altered mental status type [R41.82]  Anemia, unspecified type [D64.9]  Sepsis without acute organ dysfunction, due to unspecified organism [A41.9]    Active and Resolved Hospital Problems:  Active Hospital Problems    Diagnosis POA    **Hepatic encephalopathy [K76.82] Yes    Severe malnutrition [E43] Yes    Altered mental status, unspecified altered mental status type [R41.82] Yes      Resolved Hospital Problems   No resolved problems to display.       Hepatic encephalopathy with altered mental status-improving  Alcoholic liver disease  Worsening ascites  Alcohol withdrawal  -Patient presented with hepatic encephalopathy in the settings of cirrhosis.  -She had a discriminant factor of 36 on presentation and she was started on prednisolone.  -Gastroenterology following, appreciate recommendations.>recommending Dobbhoff tube feeding. Will monitor liver enzymes. Continue lactulose and Xifaxan. Continue prednisolone zinc and multivitamin. Continue spironolactone and furosemide and monitor creatinine   -Continue lactulose and titrate for 3-4 bowel movements in a day.  -will monitor patient for signs of withdrawal, try to limit benzodiazepine use.       Acute kidney injury  High anion gap metabolic acidosis  Lactic acidosis  Hypothyroidism  -She was placed on maintenance volume resuscitation, will give 1 L bolus and recheck blood  pressure.  -Kidney function mildly improved, anion gap closed, still has a bicarbonate of 17.  -Trend lactic acid until clearance.     UTI with cystitis-100,000 CFU/mL Escherichia coli     Received ceftriaxone, no growth on urine cultures so far.     Anemia  -She is status post 1 unit packed red blood cell transfusion for hemoglobin of 6.1.  Repeat hemoglobin pending, transfuse for hemoglobin less than 7.  -Platelet 102, continue to monitor.       Hospital Course     History of Present Illness: Nelda Alcocer is a 64 y.o. female with a CMH of multiple sclerosis, alcohol use with liver cirrhosis and tobacco abuse DVT lower extremity presented with poor oral intake and progressively worsening confusion.  Patient lives with her  and consents alcohol daily.  Per report patient has not removed from her chair in the last 3 days  was concerned and called EMS     In the ER patient was tachycardic in the range of 125 blood pressure 117/60 and saturating 99% on room air lab was significant for mildly elevated troponin of 19 which subsequently was 18; chloride of 108, bicarb of 16 and anion gap of 17.  Creatinine was elevated 1.12 with elevated BUN/creatinine ratio.  Alkaline phosphatase was 167 and AST was also elevated.  Total bilirubin was 10.8.  Lactic acid was 3 subsequently 3.4.  PT and INR were elevated 17.6 and 1.68 white cell count was 15.2 with neutrophil predominance.  Hemoglobin noted for 7.1 as compared to 12 with anemia  Microcytic features urinalysis was suggestive of infection with nitrate positive and WBC of 6-10 per microscopic field     Chest x-ray showed no acute process; CT of the head without contrast brain atrophy with microvascular ischemic changes and no other significant acute intracranial abnormality.  CT abdomen pelvis contrast showed liver cirrhosis with patchy heterogeneous liver parenchyma suggestive of hepatocellular disease or multicentric liver lesions recommended MRI.  Mild  amount of ascites was stable mildly enlarged periportal and retroperitoneal lymph nodes.  Cholelithiasis was also noted     Interval History:    4/4/24-Patient is seen and examined at bedside while still in the ED, she was laying comfortably in bed, jaundiced appearing awake and alert but not oriented.  She is able to answer yes or no to some questions but unable to follow conversations.  Other than hypotension she is stable  4/5/24 patient seen and examined in bed no acute distress, blood pressure stable, heart rate 111.Pt would benefit from SNF for continued PT at discharge.   4/6/24 patient seen and examined in bed no acute distress, discussed with RN, she is more lethargic today.  Will order ABG and ammonia level.  Family at bedside, long discussion with the daughter regarding advance directive living will.  Will consult palliative.  4/7/24 patient seen and examined in bed no acute distress vital signs blood pressure stable, heart rate 116.  She is more responsive today, discussed with RN.  4/8/24 patient seen and examined in medical distress, blood pressure stable, heart rate 109.  Patient is more alert today she is oriented place time and person.  Discussed with RN.  4/9/24 seen in bed NAD, fatigue, weakness, reports worsening abdominal distention today. KUB ordered,  Has had nausea, but no vomiting.  No bowel movements overnight.   4/10/24 seen in bed NAD, family at bed side, MAURICIO RN, patient looks comfortable, was made DRN and comfort measures were started  We will discharge patient home with hospice.    Day of Discharge     Vital Signs:       Physical Exam   Appearance: No apparent distress, non-toxic appearing   HEENT/Neck: Neck is supple, Extraocular movements intact,   Cardiovascular: Regular Rate and Rhythm, No murmurs, gallops or rubs   Pulmonary: Clear to auscultation Bilaterally.   Abdomen: BS+, Soft, non-tender, non-distended.   Ext: No Cyanosis, Clubbing, Edema.  Neuro: Non-focal, Alert & awake,  confused       Pertinent  and/or Most Recent Results     LAB RESULTS:      Lab 04/09/24  0126 04/09/24 0033 04/07/24 0505 04/06/24 0512 04/05/24  0024 04/04/24  1613   WBC  --  13.17* 15.19* 13.78* 10.82*  --    HEMOGLOBIN  --  9.1* 9.0* 8.8* 7.5*  --    HEMATOCRIT  --  30.5* 28.6* 28.4* 23.8*  --    PLATELETS  --  167 142 107* 87*  --    NEUTROS ABS  --  9.88* 11.50*  --  8.85*  --    IMMATURE GRANS (ABS)  --   --  0.24*  --  0.10*  --    LYMPHS ABS  --   --  0.82  --  0.80  --    MONOS ABS  --   --  2.58*  --  1.02*  --    EOS ABS  --   --  0.01  --  0.02  --    MCV  --  107.4* 106.7* 106.4* 104.4*  --    LACTATE  --   --   --   --  1.8 3.6*   PROTIME 18.8*  --   --  15.6* 17.0*  --          Lab 04/09/24 0033 04/08/24 0524 04/07/24 0505 04/06/24 0512 04/05/24  0024   SODIUM 139 140 139 139 139   POTASSIUM 5.4* 5.0 4.5 4.6 4.0   CHLORIDE 111* 112* 110* 110* 111*   CO2 19.0* 19.0* 18.0* 16.0* 17.0*   ANION GAP 9.0 9.0 11.0 13.0 11.0   BUN 44* 42* 36* 33* 29*   CREATININE 0.99 1.01* 1.02* 1.09* 0.94   EGFR 64.6 63.1 62.3 57.6* 68.7   GLUCOSE 130* 122* 128* 97 106*   CALCIUM 9.8 9.5 9.4 9.2 8.5*   MAGNESIUM  --  1.8 1.7  --   --    PHOSPHORUS  --  3.8 3.5  --   --          Lab 04/09/24 0033 04/08/24 0524 04/07/24  0505 04/06/24  0512 04/05/24  0024   TOTAL PROTEIN 6.4 5.9* 6.3 6.5 6.2   ALBUMIN 2.8* 2.6* 2.9* 2.8* 2.7*   GLOBULIN 3.6 3.3 3.4 3.7 3.5   ALT (SGPT) 53* 45* 38* 31 25   AST (SGOT) 94* 88* 88* 80* 52*   BILIRUBIN 5.7* 5.3* 6.0* 6.1* 6.4*   ALK PHOS 180* 132* 160* 133* 156*         Lab 04/09/24  0126 04/06/24  0512 04/05/24  0024   PROTIME 18.8* 15.6* 17.0*   INR 1.80* 1.47* 1.62*             Lab 04/05/24  0024   FOLATE 18.00         Lab 04/09/24  1834 04/06/24  0922   PH, ARTERIAL 7.299* 7.348*   PCO2, ARTERIAL 40.2 28.3*   PO2 ART 64.2* 76.5*   O2 SATURATION ART 89.8* 94.7   FIO2 100 36   HCO3 ART 19.8* 15.5*   BASE EXCESS ART -6.3* -9.0*     Brief Urine Lab Results  (Last result in the past 365  days)        Color   Clarity   Blood   Leuk Est   Nitrite   Protein   CREAT   Urine HCG        04/03/24 1551 Orange  Comment: Any Substance that causes an abnormal urine color can alter the accuracy of the chemical reactions.   Cloudy   Negative   Moderate (2+)   Positive   Negative                 Microbiology Results (last 10 days)       Procedure Component Value - Date/Time    Urine Culture - Urine, Straight Cath [[id number]]  (Abnormal)  (Susceptibility) Collected: 04/03/24 1551    Lab Status: Final result Specimen: Urine from Straight Cath Updated: 04/04/24 2335     Urine Culture >100,000 CFU/mL Escherichia coli    Narrative:      Colonization of the urinary tract without infection is common. Treatment is discouraged unless the patient is symptomatic, pregnant, or undergoing an invasive urologic procedure.    Susceptibility        Escherichia coli      JENNIFER      Amoxicillin + Clavulanate Susceptible      Ampicillin Susceptible      Ampicillin + Sulbactam Susceptible      Cefazolin Susceptible      Cefepime Susceptible      Ceftazidime Susceptible      Ceftriaxone Susceptible      Gentamicin Susceptible      Levofloxacin Susceptible      Nitrofurantoin Susceptible      Piperacillin + Tazobactam Susceptible      Trimethoprim + Sulfamethoxazole Susceptible                           Respiratory Panel PCR w/COVID-19(SARS-CoV-2) PEBBLES/DRARYN/ZULEYKA/PAD/COR/[facility]BEL In-House, NP Swab in UTM/VTM, 2 HR TAT - Swab, Nasopharynx [[id number]]  (Normal) Collected: 04/03/24 1407    Lab Status: Final result Specimen: Swab from Nasopharynx Updated: 04/03/24 1501     ADENOVIRUS, PCR Not Detected     Coronavirus 229E Not Detected     Coronavirus HKU1 Not Detected     Coronavirus NL63 Not Detected     Coronavirus OC43 Not Detected     COVID19 Not Detected     Human Metapneumovirus Not Detected     Human Rhinovirus/Enterovirus Not Detected     Influenza A PCR Not Detected     Influenza B PCR Not Detected     Parainfluenza Virus 1 Not Detected      Parainfluenza Virus 2 Not Detected     Parainfluenza Virus 3 Not Detected     Parainfluenza Virus 4 Not Detected     RSV, PCR Not Detected     Bordetella pertussis pcr Not Detected     Bordetella parapertussis PCR Not Detected     Chlamydophila pneumoniae PCR Not Detected     Mycoplasma pneumo by PCR Not Detected    Narrative:      In the setting of a positive respiratory panel with a viral infection PLUS a negative procalcitonin without other underlying concern for bacterial infection, consider observing off antibiotics or discontinuation of antibiotics and continue supportive care. If the respiratory panel is positive for atypical bacterial infection (Bordetella pertussis, Chlamydophila pneumoniae, or Mycoplasma pneumoniae), consider antibiotic de-escalation to target atypical bacterial infection.    Blood Culture - Blood, Arm, Right [[id number]]  (Normal) Collected: 04/03/24 1400    Lab Status: Final result Specimen: Blood from Arm, Right Updated: 04/08/24 1415     Blood Culture No growth at 5 days    Blood Culture - Blood, Arm, Left [[id number]]  (Normal) Collected: 04/03/24 1359    Lab Status: Final result Specimen: Blood from Arm, Left Updated: 04/08/24 1415     Blood Culture No growth at 5 days            XR Abdomen KUB    Result Date: 4/9/2024  Impression: Impression: Interval placement of nasogastric tube. The sideport is close to the gastroesophageal junction. Consider advancing 3 to 4 cm. Persistent findings for small bowel obstruction. Electronically Signed: Galo Rahman MD  4/9/2024 7:49 PM EDT  Workstation ID: [id number]    FL Small Bowel Follow Through Single-Contrast    Result Date: 4/9/2024  Impression: Impression: Markedly dilated small and large bowel loops may be secondary to severe ileus. Oral contrast did not pass into the colon at 4 hours 45 minutes. There is evidence of pneumatosis in the small bowel loops in the left mid and lower abdomen. Findings relayed to KONRAD Mijares on April 9, 2024 at 6:43  p.m. by telephone. Electronically Signed: Keo Wheat MD  4/9/2024 6:43 PM EDT  Workstation ID: [id number]    XR Abdomen KUB    Result Date: 4/9/2024  Impression: Impression: Findings remain concerning for small bowel obstruction. Electronically Signed: Yair Jacob MD  4/9/2024 10:55 AM EDT  Workstation ID: [id number]    CT Abdomen Pelvis Without Contrast    Result Date: 4/8/2024  Impression: Impression: 1. Dilated small and large bowel loops to the level of the splenic flecture. Findings may represent ileus versus partial obstruction. Clinical correlation and close follow-up are recommended. 2. Cirrhosis with increased ascites. 3. Layering right pleural effusion and compressive atelectasis of the right lower lobe. 4. Cholelithiasis. Findings were communicated to MICHAELLE Swift and Dr. Khan on 4/8/2024 at 7:50 a.m. eastern standard time. Electronically Signed: Zenia Price MD  4/8/2024 7:54 AM EDT  Workstation ID: [id number]    XR Abdomen KUB    Result Date: 4/7/2024  Impression: Impression: Esophagogastric tube tip overlies the proximal stomach. Gas-distended bowel loops overlying upper abdomen again could relate to ileus or obstruction, partially imaged. Electronically Signed: Giovani Ohara MD  4/7/2024 7:14 PM EDT  Workstation ID: [id number]    XR Abdomen KUB    Result Date: 4/6/2024  Impression: Impression: Enteric tube terminates in the left upper quadrant, with tip likely in the mid stomach. Gas distended bowel loops with possible dilatation of central small bowel loops. Correlate for ileus or obstruction. Electronically Signed: Dontae Noguera  4/6/2024 1:45 PM EDT  Workstation ID: [id number]    CT Abdomen Pelvis With Contrast    Result Date: 4/3/2024  Impression: Impression: 1. Limited exam due to respiratory fact 2. Mild diffuse nodular liver surface concerning for cirrhosis. In addition there is interval development of diffuse patchy heterogeneity of the liver parenchyma unclear if represents diffuse  hepatocellular disease or multicentric liver lesions. Further characterization with a liver MRI with and without contrast recommended 3. Mild amount of ascites 4. Stable mild enlarged periportal and retroperitoneal lymph nodes 5. Cholelithiasis 6. No CT evidence of acute pancreatitis 7. Additional ancillary findings as above Electronically Signed: Carl Chaves MD  4/3/2024 3:50 PM EDT  Workstation ID: [id number]    CT Head Without Contrast    Result Date: 4/3/2024  Impression: Impression: Brain atrophy with chronic microvascular ischemic changes No evidence of acute intracranial abnormality Limited exam due to motion artifact Electronically Signed: Carl Chaves MD  4/3/2024 3:29 PM EDT  Workstation ID: [id number]    XR Chest 1 View    Result Date: 4/3/2024  Impression: Impression: No acute process. Electronically Signed: Clara Byrne MD  4/3/2024 2:46 PM EDT  Workstation ID: [id number]     Results for orders placed during the hospital encounter of 01/13/22    Duplex venous lower extremity left CAR    Interpretation Summary  · Chronic left lower extremity deep vein thrombosis noted in the common femoral.  · All other left sided veins appeared normal.  · No change.      Results for orders placed during the hospital encounter of 01/13/22    Duplex venous lower extremity left CAR    Interpretation Summary  · Chronic left lower extremity deep vein thrombosis noted in the common femoral.  · All other left sided veins appeared normal.  · No change.          Labs Pending at Discharge:      Procedures Performed           Consults:   Consults       Date and Time Order Name Status Description    4/9/2024  6:47 PM Inpatient General Surgery Consult Completed     4/3/2024  2:39 PM Gastroenterology (on-call MD unless specified) Completed               Discharge Details        Discharge Medications        New Medications        Instructions Start Date   LORazepam Intensol 2 MG/ML concentrated solution  Generic drug: LORazepam    1 mg, Oral, Every 1 Hour PRN      morphine solution concentrated solution   10 mg, Oral, Every 2 Hours PRN             Continue These Medications        Instructions Start Date   albuterol sulfate  (90 Base) MCG/ACT inhaler  Commonly known as: PROVENTIL HFA;VENTOLIN HFA;PROAIR HFA   2 puffs, Inhalation, Every 4 Hours PRN             Stop These Medications      furosemide 40 MG tablet  Commonly known as: LASIX     spironolactone 100 MG tablet  Commonly known as: ALDACTONE              No Known Allergies      Discharge Disposition:   Hospice/Home    Diet:  Hospital:No active diet order        Discharge Activity:   Activity Instructions       Gradually Increase Activity Until at Pre-Hospitalization Level                CODE STATUS:  Code Status and Medical Interventions:   Ordered at: 04/09/24 2210     Code Status (Patient has no pulse and is not breathing):    No CPR (Do Not Attempt to Resuscitate)     Medical Interventions (Patient has pulse or is breathing):    Comfort Measures         No future appointments.    Additional Instructions for the Follow-ups that You Need to Schedule       Discharge Follow-up with PCP   As directed       Currently Documented PCP:    Kathy Hayes APRN    PCP Phone Number:    [phone]     Follow Up Details: 2 week                Time spent on Discharge including face to face service:  >30 minutes    Signature: Electronically signed by Nakul Khan MD, 04/11/24, 14:45 EDT.  [facility] Hospitalist Team

## 2024-04-17 NOTE — PROGRESS NOTES
"Enter Query Response Below      Query Response: Stage 2 pressure injuries to sacrum Electronically signed by Nakul Khan MD, 24, 1:55 PM EDT.              If applicable, please update the problem list.     Patient: Nelda Alcocer        : 1960  Account: [id number]           Admit Date: 4/3/2024        How to Respond to this query:       a. Click New Note     b. Answer query within the yellow box.                c. Update the Problem List, if applicable.      If you have any questions about this query contact me at: [email or web address]         64 year old female admitted 4/3 with hepatic encephalopathy. ED Note documented \"Significant skin breakdown noted of the perineum and buttocks with some bleeding wounds.\" The Wound Care service saw the patient on 24 and noted \"extensive incontinence associated dermatitis with red denuded skin to her labia, perineum and buttocks. Several skin erosions and partial thickness stage 2 pressure injuries to her sacrum.\"     Treatment: [place] soaks, Calazime zinc paste, low air loss mattress, frequent turns    Please clarify if the patient was treated/monitored for:    Stage 2 pressure injuries to sacrum  Moisture associated skin injury labia, perineum, and buttocks  Both, moisture associated skin injury labia, perineum, and buttocks and stage 2 pressure injuries to sacrum  Other- specify______  Unable to determine      By submitting this query, we are merely seeking further clarification of documentation to accurately reflect all conditions that you are monitoring, evaluating, treating or that extend the hospitalization or utilize additional resources of care. Please utilize your independent clinical judgment when addressing the question(s) above.     This query and your response, once completed, will be entered into the legal medical record.    Sincerely,  Rosangela Powers RN CCDS  Clinical Documentation Integrity Program     "